# Patient Record
Sex: FEMALE | Race: WHITE | NOT HISPANIC OR LATINO | Employment: OTHER | URBAN - METROPOLITAN AREA
[De-identification: names, ages, dates, MRNs, and addresses within clinical notes are randomized per-mention and may not be internally consistent; named-entity substitution may affect disease eponyms.]

---

## 2017-01-03 ENCOUNTER — PATIENT OUTREACH (OUTPATIENT)
Dept: ADMINISTRATIVE | Facility: HOSPITAL | Age: 72
End: 2017-01-03

## 2017-01-03 NOTE — LETTER
January 3, 2017    Luis Miguel Murcia  [address] [facility] [address]  [facility] [address]             [facility]  [address]  [facility] [address]  Phone: [phone] Dear Mrs. Murcia:    [facility] is committed to your overall health.  To help you get the most out of each of your visits, we will review your information to make sure you are up to date on all of your recommended tests and/or procedures.      Dr. Jeong       has found that you may be due for:    One-time Hepatitis C Screening lab test(a viral condition that can harm the liver)  Tetanus immunization  Pneumonia immunization    If you have had any of the above done at another facility, please bring the records or information with you so that your record at [facility] will be complete.     If you are currently taking medication , please bring it with you to your appointment for review.    If you have any questions or concerns, please don't hesitate to call.    Sincerely,    Debbie Carter  Clinical Care Coordinator  [facility]  [address] [place] [address]  Phone: [phone]   Fax: [phone]

## 2017-01-06 NOTE — TELEPHONE ENCOUNTER
----- Message from Bianca Barrera sent at 1/6/2017  1:12 PM CST -----  Contact: Patient  Patient needs refills on Hydrocodone, and Xanax sent [facility] [address]. If any questions call patient at [phone].

## 2017-01-09 RX ORDER — HYDROCODONE BITARTRATE AND ACETAMINOPHEN 5; 325 MG/1; MG/1
1 TABLET ORAL 2 TIMES DAILY PRN
Qty: 60 TABLET | Refills: 0 | Status: SHIPPED | OUTPATIENT
Start: 2017-01-09 | End: 2017-01-17 | Stop reason: SDUPTHER

## 2017-01-09 RX ORDER — ALPRAZOLAM 0.25 MG/1
0.25 TABLET ORAL 2 TIMES DAILY
Qty: 60 TABLET | Refills: 5 | Status: SHIPPED | OUTPATIENT
Start: 2017-01-09 | End: 2017-06-12 | Stop reason: SDUPTHER

## 2017-01-17 ENCOUNTER — TELEPHONE (OUTPATIENT)
Dept: FAMILY MEDICINE | Facility: CLINIC | Age: 72
End: 2017-01-17

## 2017-01-17 ENCOUNTER — OFFICE VISIT (OUTPATIENT)
Dept: FAMILY MEDICINE | Facility: CLINIC | Age: 72
End: 2017-01-17
Payer: MEDICARE

## 2017-01-17 ENCOUNTER — HOSPITAL ENCOUNTER (OUTPATIENT)
Dept: RADIOLOGY | Facility: HOSPITAL | Age: 72
Discharge: HOME | End: 2017-01-17
Attending: FAMILY MEDICINE
Payer: MEDICARE

## 2017-01-17 VITALS
SYSTOLIC BLOOD PRESSURE: 122 MMHG | HEIGHT: 62 IN | TEMPERATURE: 97 F | OXYGEN SATURATION: 98 % | HEART RATE: 77 BPM | DIASTOLIC BLOOD PRESSURE: 64 MMHG | WEIGHT: 125.25 LBS | BODY MASS INDEX: 23.05 KG/M2

## 2017-01-17 DIAGNOSIS — Z79.891 CHRONIC USE OF OPIATE DRUGS THERAPEUTIC PURPOSES: ICD-10-CM

## 2017-01-17 DIAGNOSIS — J44.1 COPD EXACERBATION: Primary | ICD-10-CM

## 2017-01-17 DIAGNOSIS — I10 ESSENTIAL HYPERTENSION: ICD-10-CM

## 2017-01-17 DIAGNOSIS — G47.00 INSOMNIA, UNSPECIFIED TYPE: ICD-10-CM

## 2017-01-17 DIAGNOSIS — M47.817 LUMBOSACRAL SPONDYLOSIS WITHOUT MYELOPATHY: ICD-10-CM

## 2017-01-17 DIAGNOSIS — J44.1 COPD EXACERBATION: ICD-10-CM

## 2017-01-17 DIAGNOSIS — F41.9 ANXIETY: ICD-10-CM

## 2017-01-17 DIAGNOSIS — M50.30 DEGENERATION OF CERVICAL INTERVERTEBRAL DISC: ICD-10-CM

## 2017-01-17 PROCEDURE — 99214 OFFICE O/P EST MOD 30 MIN: CPT | Mod: S$PBB,,, | Performed by: FAMILY MEDICINE

## 2017-01-17 PROCEDURE — 99999 PR PBB SHADOW E&M-EST. PATIENT-LVL III: CPT | Mod: PBBFAC,,, | Performed by: FAMILY MEDICINE

## 2017-01-17 PROCEDURE — 94640 AIRWAY INHALATION TREATMENT: CPT | Mod: PBBFAC,PO

## 2017-01-17 PROCEDURE — 71020 XR CHEST PA AND LATERAL: CPT | Mod: TC,PO

## 2017-01-17 PROCEDURE — 99213 OFFICE O/P EST LOW 20 MIN: CPT | Mod: PBBFAC,PO | Performed by: FAMILY MEDICINE

## 2017-01-17 PROCEDURE — 71020 XR CHEST PA AND LATERAL: CPT | Mod: 26,,, | Performed by: RADIOLOGY

## 2017-01-17 RX ORDER — PREDNISONE 10 MG/1
TABLET ORAL
Qty: 20 TABLET | Refills: 0 | Status: SHIPPED | OUTPATIENT
Start: 2017-01-17 | End: 2017-07-18

## 2017-01-17 RX ORDER — ALBUTEROL SULFATE 2.5 MG/.5ML
2.5 SOLUTION RESPIRATORY (INHALATION)
Status: DISCONTINUED | OUTPATIENT
Start: 2017-01-17 | End: 2017-01-17

## 2017-01-17 RX ORDER — ALBUTEROL SULFATE 2.5 MG/.5ML
2.5 SOLUTION RESPIRATORY (INHALATION)
Status: COMPLETED | OUTPATIENT
Start: 2017-01-17 | End: 2017-01-17

## 2017-01-17 RX ORDER — DOXYCYCLINE 100 MG/1
100 CAPSULE ORAL 2 TIMES DAILY
Qty: 20 CAPSULE | Refills: 0 | Status: SHIPPED | OUTPATIENT
Start: 2017-01-17 | End: 2017-07-18

## 2017-01-17 RX ORDER — HYDROCODONE BITARTRATE AND ACETAMINOPHEN 5; 325 MG/1; MG/1
1 TABLET ORAL 2 TIMES DAILY PRN
Qty: 60 TABLET | Refills: 0 | Status: SHIPPED | OUTPATIENT
Start: 2017-01-17 | End: 2017-03-13 | Stop reason: SDUPTHER

## 2017-01-17 RX ORDER — LIDOCAINE AND PRILOCAINE 25; 25 MG/G; MG/G
CREAM TOPICAL
COMMUNITY
Start: 2016-11-07 | End: 2018-03-26

## 2017-01-17 RX ORDER — AZELASTINE 1 MG/ML
SPRAY, METERED NASAL
Refills: 12 | COMMUNITY
Start: 2016-12-05 | End: 2017-07-18 | Stop reason: SDUPTHER

## 2017-01-17 RX ORDER — ALBUTEROL SULFATE 90 UG/1
2 AEROSOL, METERED RESPIRATORY (INHALATION) EVERY 6 HOURS PRN
Qty: 1 EACH | Refills: 11 | Status: SHIPPED | OUTPATIENT
Start: 2017-01-17 | End: 2018-06-21 | Stop reason: SDUPTHER

## 2017-01-17 RX ADMIN — ALBUTEROL SULFATE 2.5 MG: 2.5 SOLUTION RESPIRATORY (INHALATION) at 12:01

## 2017-01-17 NOTE — MR AVS SNAPSHOT
[facility]  [address] [facility]  [facility] [address]  Phone: [phone]  Fax: [phone]                  Luis Miguel Murcia   2017 10:40 AM   Office Visit    Description:  Female : 1945   Provider:  Juan Francisco Jeong MD   Department:  [facility]           Reason for Visit     Cough           Diagnoses this Visit        Comments    COPD exacerbation    -  Primary     Degeneration of cervical intervertebral disc         Lumbosacral spondylosis without myelopathy         Chronic use of opiate drugs therapeutic purposes         Essential hypertension         Insomnia, unspecified type         Anxiety                To Do List           Future Appointments        Provider Department Dept Phone    2017 2:30 PM NS XR3 [facility] [phone]    2017 4:00 PM Pierre Black MD [facility] [phone]    2017 11:40 AM Juan Francisco Jeong MD [facility] [phone]      Goals (5 Years of Data)     None      Follow-Up and Disposition     Return in about 6 months (around 2017).       These Medications        Disp Refills Start End    hydrocodone-acetaminophen 5-325mg (NORCO) 5-325 mg per tablet 60 tablet 0 2017     Take 1 tablet by mouth 2 (two) times daily as needed for Pain. - Oral    Pharmacy: [email or web address] [address] [place] [address] AT [facility] [employer] [facility] Ph #: [phone]       predniSONE (DELTASONE) 10 MG tablet 20 tablet 0 2017     Take 4 tabs daily for 2 days then Take 3 tabs daily for 2 days thenTake 2 tabs daily for 2 days then Take 1 tab daily for 2 days then stop    Pharmacy: [facility] [address] [place] [address] [facility] AT [facility] [employer] [facility] [employer] [address] Ph #: [phone]       doxycycline (MONODOX) 100 MG capsule 20 capsule 0 2017     Take 1 capsule (100 mg total) by mouth 2 (two) times daily. - Oral    Pharmacy:  [facility] [address] [place] [address] [address] [facility] [address] Ph #: [phone]       albuterol 90 mcg/actuation inhaler 1 each 11 1/17/2017     Inhale 2 puffs into the lungs every 6 (six) hours as needed for Wheezing. - Inhalation    Pharmacy: [facility] [address] [place] [address] [address] AT [facility] [address] Ph #: [phone]         [facility]     [facility] On Call Nurse Care Line - 24/7 Assistance  Registered nurses in the [facility] Center provide clinical advisement, health education, appointment booking, and other advisory services.  Call for this free service at [phone].             Medications           Message regarding Medications     Verify the changes and/or additions to your medication regime listed below are the same as discussed with your clinician today.  If any of these changes or additions are incorrect, please notify your healthcare provider.        START taking these NEW medications        Refills    predniSONE (DELTASONE) 10 MG tablet 0    Sig: Take 4 tabs daily for 2 days then Take 3 tabs daily for 2 days thenTake 2 tabs daily for 2 days then Take 1 tab daily for 2 days then stop    Class: Normal    doxycycline (MONODOX) 100 MG capsule 0    Sig: Take 1 capsule (100 mg total) by mouth 2 (two) times daily.    Class: Normal    Route: Oral    albuterol 90 mcg/actuation inhaler 11    Sig: Inhale 2 puffs into the lungs every 6 (six) hours as needed for Wheezing.    Class: Normal    Route: Inhalation      These medications were administered today        Dose Freq    albuterol sulfate nebulizer solution 2.5 mg 2.5 mg [facility] 1 time    Sig: Take 2.5 mg by nebulization one time.    Class: Normal    Route: Nebulization      STOP taking these medications     alendronate (FOSAMAX) 70 MG tablet TAKE 1 TABLET(70 MG) BY MOUTH EVERY 7 DAYS           Verify that the below list of medications is an accurate representation of the  medications you are currently taking.  If none reported, the list may be blank. If incorrect, please contact your healthcare provider. Carry this list with you in case of emergency.           Current Medications     alprazolam (XANAX) 0.25 MG tablet Take 1 tablet (0.25 mg total) by mouth 2 (two) times daily. as needed for anxiety.    amlodipine (NORVASC) 5 MG tablet Take 1 tablet (5 mg total) by mouth once daily.    ASCORBATE CALCIUM (VITAMIN C ORAL) Take 1,000 mg by mouth once daily.     aspirin (ECOTRIN) 81 MG EC tablet Take 81 mg by mouth once daily.    azelastine (ASTELIN) 137 mcg (0.1 %) nasal spray     b complex vitamins tablet Take 1 tablet by mouth once daily.      biotin 1 mg tablet Take 1,000 mcg by mouth once daily. 5000 mg every day    calcium-vitamin D3 500 mg(1,250mg) -200 unit per tablet Take 1 tablet by mouth 2 (two) times daily with meals.    carvedilol (COREG) 6.25 MG tablet Take 1 tablet (6.25 mg total) by mouth 2 (two) times daily with meals.    dexlansoprazole (DEXILANT) 60 mg capsule Take 1 capsule (60 mg total) by mouth before breakfast.    DOCOSAHEXANOIC ACID/EPA (FISH OIL ORAL) Take 1 capsule by mouth once daily.      hydrocodone-acetaminophen 5-325mg (NORCO) 5-325 mg per tablet Take 1 tablet by mouth 2 (two) times daily as needed for Pain.    lidocaine-prilocaine (EMLA) cream     loratadine (CLARITIN) 10 mg tablet Take 1 tablet (10 mg total) by mouth once daily.    mometasone (NASONEX) 50 mcg/actuation nasal spray INSTILL 2 SPRAY IN EACH NOSTRIL EVERY DAY    pravastatin (PRAVACHOL) 20 MG tablet Take 1 tablet (20 mg total) by mouth nightly.    ranitidine (ZANTAC) 150 MG tablet Take 1 tablet (150 mg total) by mouth nightly.    zolpidem (AMBIEN) 10 mg Tab TAKE 1 TABLET BY MOUTH EVERY NIGHT AT BEDTIME    albuterol 90 mcg/actuation inhaler Inhale 2 puffs into the lungs every 6 (six) hours as needed for Wheezing.    doxycycline (MONODOX) 100 MG capsule Take 1 capsule (100 mg total) by mouth 2  "(two) times daily.    predniSONE (DELTASONE) 10 MG tablet Take 4 tabs daily for 2 days then Take 3 tabs daily for 2 days thenTake 2 tabs daily for 2 days then Take 1 tab daily for 2 days then stop           Clinical Reference Information           Vital Signs - Last Recorded  Most recent update: 1/17/2017 10:58 AM by Emili Morillo LPN    BP Pulse Temp Ht Wt SpO2    122/64 77 97 °F (36.1 °C) (Oral) 5' 2" (1.575 m) 56.8 kg (125 lb 3.5 oz) 98%    BMI                22.9 kg/m2          Blood Pressure          Most Recent Value    BP  122/64      Allergies as of 1/17/2017     Penicillins    Chlorhexidine    Hydrochlorothiazide      Immunizations Administered on Date of Encounter - 1/17/2017     None      Orders Placed During Today's Visit     Future Labs/Procedures Expected by Expires    X-Ray Chest PA And Lateral  1/17/2017 4/17/2017      "

## 2017-01-17 NOTE — PROGRESS NOTES
Subjective:       Patient ID: Luis Miguel Murcia is a 71 y.o. female.    Chief Complaint: Cough    HPI     htn is stable.     C/o chronic right lumbar radicular pain > 1 year. Sees pain management. Reports worsening low back pain x 3 weeks. Currently taking norco bid instead of daily. Had poppy on 5/2/16.      Also, has chronic neck pain > 1 year. Sees pain management. Ps: 2-3/10 while on norco     Imaging:  C-spine MRI 9/30/10: There is anterolisthesis of C3 on 4 and 4 on 5 mile, retrolisthesis of C5 and 6. At C3/4 there is a mild bulge with foraminal narrowing bilaterally mild. At C5-C6 there is a disc bulge with right upper protrusion of the disc but severe foraminal narrowing on the right and moderate to severe on the left. There is disc bulging at C6/7 with a bulge more to the right.     MRI L-spine 5/2011: At L3/4 there is mild anterolisthesis, Modic endplate changes. There is bilateral severe facet arthropathy and a broad-based disc bulge more to the left with compression of the descending L4 nerve root and slight effacement of the L3 nerve root at that level. At L4-5 there is bilateral facet arthropathy. At L5/S1 there is mild disc bulge and arthropathy. There is also Ligamentous hypertrophy at L4/5 posteriorly causing some narrowing of the central canal.     Anxiety stable while taking xanax.     C/o  cough, wheeze, subjective fever and malaise that started 1 week ago.  Reports intermittently coughing up grey phlegm.  Last had subjective fever approx 2-3 days ago.           Review of Systems      Review of Systems   Constitutional: Negative for fever and chills.   HENT: Negative for hearing loss and neck stiffness.    Eyes: Negative for redness and itching.   Respiratory: Negative for choking.    Cardiovascular: Negative for chest pain and leg swelling.  Abdomen: Negative for abdominal pain and blood in stool.   Genitourinary: Negative for dysuria and flank pain.   Musculoskeletal: Negative for gait  problem.   Neurological: Negative for light-headedness and headaches.   Hematological: Negative for adenopathy.   Psychiatric/Behavioral: Negative for behavioral problems.     Objective:      Physical Exam   Constitutional: She appears well-developed.   HENT:   Head: Normocephalic and atraumatic.   Eyes: Conjunctivae are normal. Pupils are equal, round, and reactive to light.   Neck: Normal range of motion.   Cardiovascular: Normal rate and regular rhythm.    No murmur heard.  Pulmonary/Chest: Effort normal.   Dec bs thruout. Mild exp wheeze.    Lymphadenopathy:     She has no cervical adenopathy.       Assessment:       1. COPD exacerbation    2. Degeneration of cervical intervertebral disc    3. Lumbosacral spondylosis without myelopathy    4. Chronic use of opiate drugs therapeutic purposes    5. Essential hypertension    6. Insomnia, unspecified type    7. Anxiety        Plan:       COPD exacerbation  -     X-Ray Chest PA And Lateral; Future; Expected date: 1/17/17    Degeneration of cervical intervertebral disc    Lumbosacral spondylosis without myelopathy    Chronic use of opiate drugs therapeutic purposes    Essential hypertension    Insomnia, unspecified type    Anxiety    Other orders  -     hydrocodone-acetaminophen 5-325mg (NORCO) 5-325 mg per tablet; Take 1 tablet by mouth 2 (two) times daily as needed for Pain.  Dispense: 60 tablet; Refill: 0  -     Discontinue: albuterol sulfate nebulizer solution 2.5 mg; Take 2.5 mg by nebulization one time.  -     predniSONE (DELTASONE) 10 MG tablet; Take 4 tabs daily for 2 days then Take 3 tabs daily for 2 days thenTake 2 tabs daily for 2 days then Take 1 tab daily for 2 days then stop  Dispense: 20 tablet; Refill: 0  -     doxycycline (MONODOX) 100 MG capsule; Take 1 capsule (100 mg total) by mouth 2 (two) times daily.  Dispense: 20 capsule; Refill: 0  -     albuterol 90 mcg/actuation inhaler; Inhale 2 puffs into the lungs every 6 (six) hours as needed for  Wheezing.  Dispense: 1 each; Refill: 11  -     albuterol sulfate nebulizer solution 2.5 mg; Take 2.5 mg by nebulization one time.          Plan:  rf norco  Better a/e on auscultation of lungs after albuterol neb  Order cxray to r/o pneumonia. Start doxy, prednisone taper and proair  Cont all other meds    Medication List with Changes/Refills   New Medications    ALBUTEROL 90 MCG/ACTUATION INHALER    Inhale 2 puffs into the lungs every 6 (six) hours as needed for Wheezing.    DOXYCYCLINE (MONODOX) 100 MG CAPSULE    Take 1 capsule (100 mg total) by mouth 2 (two) times daily.    PREDNISONE (DELTASONE) 10 MG TABLET    Take 4 tabs daily for 2 days then Take 3 tabs daily for 2 days thenTake 2 tabs daily for 2 days then Take 1 tab daily for 2 days then stop   Current Medications    ALPRAZOLAM (XANAX) 0.25 MG TABLET    Take 1 tablet (0.25 mg total) by mouth 2 (two) times daily. as needed for anxiety.    AMLODIPINE (NORVASC) 5 MG TABLET    Take 1 tablet (5 mg total) by mouth once daily.    ASCORBATE CALCIUM (VITAMIN C ORAL)    Take 1,000 mg by mouth once daily.     ASPIRIN (ECOTRIN) 81 MG EC TABLET    Take 81 mg by mouth once daily.    AZELASTINE (ASTELIN) 137 MCG (0.1 %) NASAL SPRAY        B COMPLEX VITAMINS TABLET    Take 1 tablet by mouth once daily.      BIOTIN 1 MG TABLET    Take 1,000 mcg by mouth once daily. 5000 mg every day    CALCIUM-VITAMIN D3 500 MG(1,250MG) -200 UNIT PER TABLET    Take 1 tablet by mouth 2 (two) times daily with meals.    CARVEDILOL (COREG) 6.25 MG TABLET    Take 1 tablet (6.25 mg total) by mouth 2 (two) times daily with meals.    DEXLANSOPRAZOLE (DEXILANT) 60 MG CAPSULE    Take 1 capsule (60 mg total) by mouth before breakfast.    DOCOSAHEXANOIC ACID/EPA (FISH OIL ORAL)    Take 1 capsule by mouth once daily.      LIDOCAINE-PRILOCAINE (EMLA) CREAM        LORATADINE (CLARITIN) 10 MG TABLET    Take 1 tablet (10 mg total) by mouth once daily.    MOMETASONE (NASONEX) 50 MCG/ACTUATION NASAL SPRAY     INSTILL 2 SPRAY IN EACH NOSTRIL EVERY DAY    PRAVASTATIN (PRAVACHOL) 20 MG TABLET    Take 1 tablet (20 mg total) by mouth nightly.    RANITIDINE (ZANTAC) 150 MG TABLET    Take 1 tablet (150 mg total) by mouth nightly.    ZOLPIDEM (AMBIEN) 10 MG TAB    TAKE 1 TABLET BY MOUTH EVERY NIGHT AT BEDTIME   Changed and/or Refilled Medications    Modified Medication Previous Medication    HYDROCODONE-ACETAMINOPHEN 5-325MG (NORCO) 5-325 MG PER TABLET hydrocodone-acetaminophen 5-325mg (NORCO) 5-325 mg per tablet       Take 1 tablet by mouth 2 (two) times daily as needed for Pain.    Take 1 tablet by mouth 2 (two) times daily as needed for Pain.   Discontinued Medications    ALENDRONATE (FOSAMAX) 70 MG TABLET    TAKE 1 TABLET(70 MG) BY MOUTH EVERY 7 DAYS    NASONEX 50 MCG/ACTUATION NASAL SPRAY    INSTILL 2 SPRAYS INTO EACH NOSTRIL EVERY DAY

## 2017-01-17 NOTE — TELEPHONE ENCOUNTER
inform pt via phone that I reviewed the test results and note the following:    Your chest xray was negative for pneumonia

## 2017-02-16 ENCOUNTER — TELEPHONE (OUTPATIENT)
Dept: SURGERY | Facility: CLINIC | Age: 72
End: 2017-02-16

## 2017-02-16 NOTE — TELEPHONE ENCOUNTER
----- Message from Joslyn Rocha sent at 2/16/2017 11:41 AM CST -----  Contact: pt  Pt has questions about appt has on 2-23-17  Call back on # [phone]   thanks  Patient had some questions about the banding procedure on 2/23/17.  Patient is concerned about taking a sits bath because of her age.  She lives alone and is concerned that she may not be able to get up out of the tub.  Also, she wanted to know the recovery time from banding, as I explained to her that every patient heals differently and she should be okay after 2-3 days.  I told patient to call back if she has anymore concerns.  Renato

## 2017-02-23 ENCOUNTER — OFFICE VISIT (OUTPATIENT)
Dept: SURGERY | Facility: CLINIC | Age: 72
End: 2017-02-23
Payer: MEDICARE

## 2017-02-23 VITALS
HEIGHT: 62 IN | SYSTOLIC BLOOD PRESSURE: 147 MMHG | BODY MASS INDEX: 23.08 KG/M2 | HEART RATE: 70 BPM | WEIGHT: 125.44 LBS | RESPIRATION RATE: 16 BRPM | DIASTOLIC BLOOD PRESSURE: 72 MMHG

## 2017-02-23 DIAGNOSIS — K64.2 THIRD DEGREE HEMORRHOIDS: Primary | ICD-10-CM

## 2017-02-23 PROCEDURE — 46221 LIGATION OF HEMORRHOID(S): CPT | Mod: S$PBB,,, | Performed by: SURGERY

## 2017-02-23 PROCEDURE — 99213 OFFICE O/P EST LOW 20 MIN: CPT | Mod: PBBFAC,PO | Performed by: SURGERY

## 2017-02-23 PROCEDURE — 99499 UNLISTED E&M SERVICE: CPT | Mod: S$PBB,,, | Performed by: SURGERY

## 2017-02-23 PROCEDURE — 46221 LIGATION OF HEMORRHOID(S): CPT | Mod: PBBFAC,PO | Performed by: SURGERY

## 2017-02-23 PROCEDURE — 99999 PR PBB SHADOW E&M-EST. PATIENT-LVL III: CPT | Mod: PBBFAC,,, | Performed by: SURGERY

## 2017-02-23 NOTE — PROGRESS NOTES
Jovita 72 yo F whom I am familiar with.  She has known hemorrhoids and was recommended to have banding in the past.  She returns today for banding.  Notes bleeding and increased prolapse tissue.       AFVSS  AAOx3  CTA B  Sinus  Soft/nd/nt  Rectal: ext exam demonstrates nonthrombosed ext hemorrhoids. D RE with normal sphincter tone.  Anoscopy performed demonstrating enlarged int hemorrhoids in the R ant, R post and L lateral position    A/P: Prolapsing hemorrhoids    D/w pt recommended banding which she desired to proceed with    Having received informed consent pt was placed in prone jackknife position. I then placed rubber bands on the L lateral column, R Ant and R post columns without event. Bleeding was minimal and pt tolerated the procedure well.      Pt will RTC in 6 weeks

## 2017-02-23 NOTE — MR AVS SNAPSHOT
[facility]  [address]  [facility] [address]  Phone: [phone]                  Luis Miguel Murcia   2017 3:45 PM   Office Visit    Description:  Female : 1945   Provider:  Pierre Black MD   Department:  [facility]           Reason for Visit     Hemorrhoids           Diagnoses this Visit        Comments    Third degree hemorrhoids    -  Primary            To Do List           Future Appointments        Provider Department Dept Phone    2017 11:40 AM Juan Francisco Jeong MD [facility] [phone]      Goals (5 Years of Data)     None      [facility]     [facility] On Call Nurse Care Line -  Assistance  Registered nurses in the [facility] On Call Center provide clinical advisement, health education, appointment booking, and other advisory services.  Call for this free service at [phone].             Medications           Message regarding Medications     Verify the changes and/or additions to your medication regime listed below are the same as discussed with your clinician today.  If any of these changes or additions are incorrect, please notify your healthcare provider.             Verify that the below list of medications is an accurate representation of the medications you are currently taking.  If none reported, the list may be blank. If incorrect, please contact your healthcare provider. Carry this list with you in case of emergency.           Current Medications     albuterol 90 mcg/actuation inhaler Inhale 2 puffs into the lungs every 6 (six) hours as needed for Wheezing.    alprazolam (XANAX) 0.25 MG tablet Take 1 tablet (0.25 mg total) by mouth 2 (two) times daily. as needed for anxiety.    amlodipine (NORVASC) 5 MG tablet Take 1 tablet (5 mg total) by mouth once daily.    ASCORBATE CALCIUM (VITAMIN C ORAL) Take 1,000 mg by mouth once daily.     aspirin (ECOTRIN) 81 MG EC tablet Take 81 mg by mouth once  "daily.    azelastine (ASTELIN) 137 mcg (0.1 %) nasal spray     b complex vitamins tablet Take 1 tablet by mouth once daily.      biotin 1 mg tablet Take 1,000 mcg by mouth once daily. 5000 mg every day    calcium-vitamin D3 500 mg(1,250mg) -200 unit per tablet Take 1 tablet by mouth 2 (two) times daily with meals.    carvedilol (COREG) 6.25 MG tablet Take 1 tablet (6.25 mg total) by mouth 2 (two) times daily with meals.    dexlansoprazole (DEXILANT) 60 mg capsule Take 1 capsule (60 mg total) by mouth before breakfast.    DOCOSAHEXANOIC ACID/EPA (FISH OIL ORAL) Take 1 capsule by mouth once daily.      doxycycline (MONODOX) 100 MG capsule Take 1 capsule (100 mg total) by mouth 2 (two) times daily.    hydrocodone-acetaminophen 5-325mg (NORCO) 5-325 mg per tablet Take 1 tablet by mouth 2 (two) times daily as needed for Pain.    lidocaine-prilocaine (EMLA) cream     loratadine (CLARITIN) 10 mg tablet Take 1 tablet (10 mg total) by mouth once daily.    mometasone (NASONEX) 50 mcg/actuation nasal spray INSTILL 2 SPRAY IN EACH NOSTRIL EVERY DAY    pravastatin (PRAVACHOL) 20 MG tablet Take 1 tablet (20 mg total) by mouth nightly.    predniSONE (DELTASONE) 10 MG tablet Take 4 tabs daily for 2 days then Take 3 tabs daily for 2 days thenTake 2 tabs daily for 2 days then Take 1 tab daily for 2 days then stop    ranitidine (ZANTAC) 150 MG tablet Take 1 tablet (150 mg total) by mouth nightly.    zolpidem (AMBIEN) 10 mg Tab TAKE 1 TABLET BY MOUTH EVERY NIGHT AT BEDTIME           Clinical Reference Information           Your Vitals Were     BP Pulse Resp Height Weight BMI    147/72 70 16 5' 2" (1.575 m) 56.9 kg (125 lb 7.1 oz) 22.94 kg/m2      Blood Pressure          Most Recent Value    BP  (!)  147/72      Allergies as of 2/23/2017     Penicillins    Chlorhexidine    Hydrochlorothiazide      Immunizations Administered on Date of Encounter - 2/23/2017     None      Language Assistance Services     ATTENTION: Language assistance " services are available, free of charge. Please call [phone].      ATENCIÓN: Si habla arron, tiene a durand disposición servicios gratuitos de asistencia lingüística. Llame al [phone].     CHÚ Ý: N?u b?n nói Ti?ng Vi?t, có các d?ch v? h? tr? ngôn ng? mi?n phí dành cho b?n. G?i s? [phone].         [facility] complies with applicable Federal civil rights laws and does not discriminate on the basis of race, color, national origin, age, disability, or sex.

## 2017-03-13 NOTE — TELEPHONE ENCOUNTER
----- Message from Brianna Mac sent at 3/13/2017 12:56 PM CDT -----  Contact: pt  hydrocodone-acetaminophen 5-325mg (NORCO) 5-325 mg per  .  [facility] [place] [address] - [address] [facility] [address] & [address] [address]  Phone: [phone] Fax: [phone]    Call back

## 2017-03-15 RX ORDER — HYDROCODONE BITARTRATE AND ACETAMINOPHEN 5; 325 MG/1; MG/1
1 TABLET ORAL 2 TIMES DAILY PRN
Qty: 60 TABLET | Refills: 0 | Status: SHIPPED | OUTPATIENT
Start: 2017-03-15 | End: 2017-03-16 | Stop reason: SDUPTHER

## 2017-03-16 NOTE — TELEPHONE ENCOUNTER
----- Message from Avelina Brambila sent at 3/16/2017 10:56 AM CDT -----  hydrocodone-acetaminophen 5-325mg (NORCO) 5-325 mg per tablet refill    [facility] [address] - [address] [place] [address] & [address] [address]  Phone: [phone] Fax: [phone]

## 2017-03-19 RX ORDER — HYDROCODONE BITARTRATE AND ACETAMINOPHEN 5; 325 MG/1; MG/1
1 TABLET ORAL 2 TIMES DAILY PRN
Qty: 60 TABLET | Refills: 0 | Status: SHIPPED | OUTPATIENT
Start: 2017-03-19 | End: 2017-04-18 | Stop reason: SDUPTHER

## 2017-03-20 DIAGNOSIS — J31.0 CHRONIC RHINITIS: ICD-10-CM

## 2017-03-20 DIAGNOSIS — K21.9 LPRD (LARYNGOPHARYNGEAL REFLUX DISEASE): ICD-10-CM

## 2017-03-21 RX ORDER — AZELASTINE 1 MG/ML
SPRAY, METERED NASAL
Qty: 30 ML | Refills: 0 | Status: SHIPPED | OUTPATIENT
Start: 2017-03-21 | End: 2017-06-12 | Stop reason: SDUPTHER

## 2017-03-21 RX ORDER — DEXLANSOPRAZOLE 60 MG/1
CAPSULE, DELAYED RELEASE ORAL
Qty: 90 CAPSULE | Refills: 0 | Status: SHIPPED | OUTPATIENT
Start: 2017-03-21 | End: 2017-06-12 | Stop reason: SDUPTHER

## 2017-04-18 NOTE — TELEPHONE ENCOUNTER
----- Message from Kasei Dilma sent at 4/18/2017 11:30 AM CDT -----  Contact: Patient  Patient states that she needs her refill for the hydrocodone-acetaminophen 5-325mg (NORCO) 5-325 mg per tablets.  Any questions, please call [phone].  Thank you      [facility] [address] [place] [address] & [address] [address]  Phone: [phone] Fax: [phone]

## 2017-04-20 RX ORDER — HYDROCODONE BITARTRATE AND ACETAMINOPHEN 5; 325 MG/1; MG/1
1 TABLET ORAL 2 TIMES DAILY PRN
Qty: 60 TABLET | Refills: 0 | Status: SHIPPED | OUTPATIENT
Start: 2017-04-20 | End: 2017-04-20 | Stop reason: SDUPTHER

## 2017-04-20 NOTE — TELEPHONE ENCOUNTER
----- Message from Joyce Downs sent at 4/20/2017 12:25 PM CDT -----  Refill on Rx Hydrocodone.  Please send into [facility].  Any questions call [phone].

## 2017-04-23 RX ORDER — HYDROCODONE BITARTRATE AND ACETAMINOPHEN 5; 325 MG/1; MG/1
1 TABLET ORAL 2 TIMES DAILY PRN
Qty: 60 TABLET | Refills: 0 | Status: SHIPPED | OUTPATIENT
Start: 2017-04-23 | End: 2017-05-18 | Stop reason: SDUPTHER

## 2017-05-18 NOTE — TELEPHONE ENCOUNTER
----- Message from Cindy Marie sent at 5/18/2017  1:29 PM CDT -----  Patient needs a refill of medication: Hydrocodone called into [facility]'s pharmacy on [id number]. Please order or call patient back at [phone] if you have any questions. Thanks!

## 2017-05-19 RX ORDER — HYDROCODONE BITARTRATE AND ACETAMINOPHEN 5; 325 MG/1; MG/1
1 TABLET ORAL 2 TIMES DAILY PRN
Qty: 60 TABLET | Refills: 0 | Status: SHIPPED | OUTPATIENT
Start: 2017-05-19 | End: 2017-06-12 | Stop reason: SDUPTHER

## 2017-06-02 RX ORDER — ZOLPIDEM TARTRATE 10 MG/1
TABLET ORAL
Qty: 30 TABLET | Refills: 2 | Status: SHIPPED | OUTPATIENT
Start: 2017-06-02 | End: 2017-08-29 | Stop reason: SDUPTHER

## 2017-06-12 DIAGNOSIS — K21.9 LPRD (LARYNGOPHARYNGEAL REFLUX DISEASE): ICD-10-CM

## 2017-06-12 DIAGNOSIS — J31.0 CHRONIC RHINITIS: ICD-10-CM

## 2017-06-12 NOTE — TELEPHONE ENCOUNTER
----- Message from María Beverly sent at 6/12/2017 11:41 AM CDT -----  Contact: patient  Patient calling in regards to requesting refills for Dexilant, Amlodipine, Carvedilol, Pravastatin, Alprazolam, Ranitidine, Hydrocodone and Azelastine.  Please advise.  Call back   Thanks!  [facility] [place] [address] AT [facility] [address] & [address] [address]  Phone: [phone] Fax: [phone]

## 2017-06-13 RX ORDER — CARVEDILOL 6.25 MG/1
TABLET ORAL
Qty: 180 TABLET | Refills: 0 | Status: SHIPPED | OUTPATIENT
Start: 2017-06-13 | End: 2017-07-18

## 2017-06-13 RX ORDER — AZELASTINE 1 MG/ML
SPRAY, METERED NASAL
Qty: 30 ML | Refills: 3 | Status: SHIPPED | OUTPATIENT
Start: 2017-06-13 | End: 2018-01-29 | Stop reason: SDUPTHER

## 2017-06-13 RX ORDER — AMLODIPINE BESYLATE 5 MG/1
5 TABLET ORAL DAILY
Qty: 90 TABLET | Refills: 3 | Status: SHIPPED | OUTPATIENT
Start: 2017-06-13 | End: 2018-07-31 | Stop reason: SDUPTHER

## 2017-06-13 RX ORDER — HYDROCODONE BITARTRATE AND ACETAMINOPHEN 5; 325 MG/1; MG/1
1 TABLET ORAL 2 TIMES DAILY PRN
Qty: 60 TABLET | Refills: 0 | Status: SHIPPED | OUTPATIENT
Start: 2017-06-13 | End: 2017-07-18 | Stop reason: SDUPTHER

## 2017-06-13 RX ORDER — PRAVASTATIN SODIUM 20 MG/1
20 TABLET ORAL NIGHTLY
Qty: 90 TABLET | Refills: 3 | Status: SHIPPED | OUTPATIENT
Start: 2017-06-13 | End: 2017-08-17

## 2017-06-13 RX ORDER — DEXLANSOPRAZOLE 60 MG/1
1 CAPSULE, DELAYED RELEASE ORAL EVERY MORNING
Qty: 90 CAPSULE | Refills: 3 | Status: SHIPPED | OUTPATIENT
Start: 2017-06-13 | End: 2018-06-12 | Stop reason: SDUPTHER

## 2017-06-13 RX ORDER — ALPRAZOLAM 0.25 MG/1
0.25 TABLET ORAL 2 TIMES DAILY
Qty: 60 TABLET | Refills: 5 | Status: SHIPPED | OUTPATIENT
Start: 2017-06-13 | End: 2017-12-05 | Stop reason: SDUPTHER

## 2017-07-11 ENCOUNTER — OFFICE VISIT (OUTPATIENT)
Dept: PODIATRY | Facility: CLINIC | Age: 72
End: 2017-07-11
Payer: MEDICARE

## 2017-07-11 VITALS — HEIGHT: 62 IN | BODY MASS INDEX: 23.08 KG/M2 | WEIGHT: 125.44 LBS

## 2017-07-11 DIAGNOSIS — M77.50 TENDINITIS OF ANKLE: ICD-10-CM

## 2017-07-11 DIAGNOSIS — M77.9 ENTHESITIS: Primary | ICD-10-CM

## 2017-07-11 PROCEDURE — 99213 OFFICE O/P EST LOW 20 MIN: CPT | Mod: PBBFAC,PO

## 2017-07-11 PROCEDURE — 1125F AMNT PAIN NOTED PAIN PRSNT: CPT | Mod: ,,,

## 2017-07-11 PROCEDURE — 99213 OFFICE O/P EST LOW 20 MIN: CPT | Mod: S$PBB,,,

## 2017-07-11 PROCEDURE — 1159F MED LIST DOCD IN RCRD: CPT | Mod: ,,,

## 2017-07-11 PROCEDURE — 99999 PR PBB SHADOW E&M-EST. PATIENT-LVL III: CPT | Mod: PBBFAC,,,

## 2017-07-11 RX ORDER — METHYLPREDNISOLONE 4 MG/1
TABLET ORAL
Qty: 1 PACKAGE | Refills: 0 | Status: SHIPPED | OUTPATIENT
Start: 2017-07-11 | End: 2017-07-18

## 2017-07-11 NOTE — PROGRESS NOTES
Chief Complaint   Patient presents with    Foot Pain     manasa foot pain        History of present illness: Patient returns to right PB tendinitis, enthesitis. She's doing worse, only improvement was with steroids, she has declined a boot.  Very active.    Review of Systems:  Vascular : negative for rest pain, claudication or bruising  Musculoskeletal: + for foot pain  Skin: negative for rashes, open wounds and lesions  Neurological: negative for burning, tingling and numbness  All other negative.    Past Medical, Family and Social History reviewed.    Constitutional:  Patient is oriented to person, place, and time. Vital signs are normal.  Appears well-developed and well-nourished.     Vascular:  Dorsalis pedis pulses are 2+ on the right side, and 2+ on the left side.   Posterior tibial pulses are 2+ on the right side, and 2+ on the left side.   + digital hair growth, capillary fill time to all toes <3 seconds, no swelling    Skin/Dermatological:  Skin is warm and intact.  No cyanosis or clubbing.  No rashes noted.  No open wounds.      Musculoskeletal:      Pes cavus with very mild tenderness to the base of the right fifth metatarsal and along the peroneal brevis tendon.        Neurological:  No deficits to sharp/dull, light touch or vibratory sensation.   Muscle strength to tibialis anterior, extensor hallucis longus, extensor digitorum longus, peroneal muscles, flexor hallucis/digotorum longus, posterior tibial and gastrosoleal complex is 5/5, normal tone without assymmetry   Patellar reflexes are 2+ on the right side and 2+ on the left side.  Achilles reflexes are 2+ on the right side and 2+ on the left side.      Assessment/Plan:  Enthesitis, peroneal brevis tendinitis right foot: Medrol Dosepack, short leg cast CHEKO camacho.  Referral to PT to start in 2 weeks if pain is better, rtc 1 month, consider MRI or surgery if no improvement.

## 2017-07-12 ENCOUNTER — TELEPHONE (OUTPATIENT)
Dept: PODIATRY | Facility: CLINIC | Age: 72
End: 2017-07-12

## 2017-07-12 NOTE — TELEPHONE ENCOUNTER
----- Message from Tash Greene sent at 7/12/2017  1:57 PM CDT -----  Contact: self  Patient calling back with the fax number to [facility] so orders can be sent to them for physical therapy.  The fax number is [phone]. please call patient at [phone]

## 2017-07-14 ENCOUNTER — TELEPHONE (OUTPATIENT)
Dept: PODIATRY | Facility: CLINIC | Age: 72
End: 2017-07-14

## 2017-07-14 NOTE — TELEPHONE ENCOUNTER
----- Message from Bianca Flores sent at 7/14/2017  9:39 AM CDT -----  Contact: Linda with [facility]  Linda needs the diagnosis codes for Physical Therapy. Please call Linda at [phone].

## 2017-07-14 NOTE — TELEPHONE ENCOUNTER
----- Message from Obinna Vickers sent at 7/14/2017  8:36 AM CDT -----  Contact: pt  Pt is requesting a callback, have some questions about the Physical Therapy  Call Back#[phone]  Thanks

## 2017-07-18 ENCOUNTER — OFFICE VISIT (OUTPATIENT)
Dept: FAMILY MEDICINE | Facility: CLINIC | Age: 72
End: 2017-07-18
Payer: MEDICARE

## 2017-07-18 VITALS
WEIGHT: 123.69 LBS | BODY MASS INDEX: 22.76 KG/M2 | SYSTOLIC BLOOD PRESSURE: 141 MMHG | OXYGEN SATURATION: 98 % | HEART RATE: 78 BPM | DIASTOLIC BLOOD PRESSURE: 90 MMHG | HEIGHT: 62 IN

## 2017-07-18 DIAGNOSIS — M51.37 DEGENERATION OF LUMBAR OR LUMBOSACRAL INTERVERTEBRAL DISC: ICD-10-CM

## 2017-07-18 DIAGNOSIS — J44.9 CHRONIC OBSTRUCTIVE PULMONARY DISEASE, UNSPECIFIED COPD TYPE: ICD-10-CM

## 2017-07-18 DIAGNOSIS — F41.9 ANXIETY: ICD-10-CM

## 2017-07-18 DIAGNOSIS — I10 ESSENTIAL HYPERTENSION: Primary | ICD-10-CM

## 2017-07-18 DIAGNOSIS — Z11.59 NEED FOR HEPATITIS C SCREENING TEST: ICD-10-CM

## 2017-07-18 DIAGNOSIS — Z12.11 COLON CANCER SCREENING: ICD-10-CM

## 2017-07-18 PROCEDURE — 99214 OFFICE O/P EST MOD 30 MIN: CPT | Mod: S$PBB,,, | Performed by: FAMILY MEDICINE

## 2017-07-18 PROCEDURE — 1126F AMNT PAIN NOTED NONE PRSNT: CPT | Mod: ,,, | Performed by: FAMILY MEDICINE

## 2017-07-18 PROCEDURE — 99999 PR PBB SHADOW E&M-EST. PATIENT-LVL III: CPT | Mod: PBBFAC,,, | Performed by: FAMILY MEDICINE

## 2017-07-18 PROCEDURE — 99213 OFFICE O/P EST LOW 20 MIN: CPT | Mod: PBBFAC,PO | Performed by: FAMILY MEDICINE

## 2017-07-18 PROCEDURE — 1159F MED LIST DOCD IN RCRD: CPT | Mod: ,,, | Performed by: FAMILY MEDICINE

## 2017-07-18 RX ORDER — HYDROCODONE BITARTRATE AND ACETAMINOPHEN 5; 325 MG/1; MG/1
1 TABLET ORAL 2 TIMES DAILY PRN
Qty: 60 TABLET | Refills: 0 | Status: ON HOLD | OUTPATIENT
Start: 2017-08-17 | End: 2017-08-18 | Stop reason: CLARIF

## 2017-07-18 RX ORDER — CARVEDILOL 12.5 MG/1
12.5 TABLET ORAL 2 TIMES DAILY WITH MEALS
Qty: 180 TABLET | Refills: 3 | Status: SHIPPED | OUTPATIENT
Start: 2017-07-18 | End: 2018-07-16 | Stop reason: SDUPTHER

## 2017-07-18 RX ORDER — HYDROCODONE BITARTRATE AND ACETAMINOPHEN 5; 325 MG/1; MG/1
1 TABLET ORAL 2 TIMES DAILY PRN
Qty: 60 TABLET | Refills: 0 | Status: ON HOLD | OUTPATIENT
Start: 2017-07-18 | End: 2017-08-18 | Stop reason: CLARIF

## 2017-07-18 RX ORDER — HYDROCODONE BITARTRATE AND ACETAMINOPHEN 5; 325 MG/1; MG/1
1 TABLET ORAL 2 TIMES DAILY PRN
Qty: 60 TABLET | Refills: 0 | Status: SHIPPED | OUTPATIENT
Start: 2017-09-16 | End: 2017-10-16

## 2017-07-18 NOTE — PROGRESS NOTES
Subjective:       Patient ID: Luis Miguel Murcia is a 72 y.o. female.    Chief Complaint: COPD and Hypertension    HPI     htn is stable.     C/o chronic right lumbar radicular pain > 1 year. Sees pain management. Ps: 2-3/10 while on norco.     Also, has chronic neck pain > 1 year. Sees pain management. Ps: 2-3/10 while on norco     Imaging:  C-spine MRI 9/30/10: There is anterolisthesis of C3 on 4 and 4 on 5 mile, retrolisthesis of C5 and 6. At C3/4 there is a mild bulge with foraminal narrowing bilaterally mild. At C5-C6 there is a disc bulge with right upper protrusion of the disc but severe foraminal narrowing on the right and moderate to severe on the left. There is disc bulging at C6/7 with a bulge more to the right.     MRI L-spine 5/2011: At L3/4 there is mild anterolisthesis, Modic endplate changes. There is bilateral severe facet arthropathy and a broad-based disc bulge more to the left with compression of the descending L4 nerve root and slight effacement of the L3 nerve root at that level. At L4-5 there is bilateral facet arthropathy. At L5/S1 there is mild disc bulge and arthropathy. There is also Ligamentous hypertrophy at L4/5 posteriorly causing some narrowing of the central canal.     Anxiety stable while taking xanax.     Copd stable. No worsening cough or wheeze.        Review of Systems      Review of Systems   Constitutional: Negative for fever and chills.   HENT: Negative for hearing loss and neck stiffness.    Eyes: Negative for redness and itching.   Respiratory: Negative for cough and choking.    Cardiovascular: Negative for chest pain and leg swelling.  Abdomen: Negative for abdominal pain and blood in stool.   Genitourinary: Negative for dysuria and flank pain.   Musculoskeletal: Negative for gait problem.   Neurological: Negative for light-headedness and headaches.   Hematological: Negative for adenopathy.   Psychiatric/Behavioral: Negative for behavioral problems.     Objective:       Physical Exam   Constitutional: She appears well-developed.   HENT:   Head: Normocephalic and atraumatic.   Eyes: Conjunctivae are normal. Pupils are equal, round, and reactive to light.   Neck: Normal range of motion.   Cardiovascular: Normal rate and regular rhythm.    No murmur heard.  Pulmonary/Chest: Effort normal and breath sounds normal. She has no wheezes.   Lymphadenopathy:     She has no cervical adenopathy.       Assessment:       1. Essential hypertension    2. Colon cancer screening    3. Need for hepatitis C screening test    4. Degeneration of lumbar or lumbosacral intervertebral disc    5. Anxiety    6. Chronic obstructive pulmonary disease, unspecified COPD type        Plan:       Essential hypertension-uncontrolled  -     Comprehensive metabolic panel; Future; Expected date: 07/18/2017  -     Lipid panel; Future; Expected date: 07/18/2017    Colon cancer screening  -     Case request GI: COLONOSCOPY    Need for hepatitis C screening test  -     Hepatitis C antibody; Future; Expected date: 01/17/2018    Degeneration of lumbar or lumbosacral intervertebral disc    Anxiety    Chronic obstructive pulmonary disease, unspecified COPD type    Other orders  -     carvedilol (COREG) 12.5 MG tablet; Take 1 tablet (12.5 mg total) by mouth 2 (two) times daily with meals.  Dispense: 180 tablet; Refill: 3  -     hydrocodone-acetaminophen 5-325mg (NORCO) 5-325 mg per tablet; Take 1 tablet by mouth 2 (two) times daily as needed for Pain.  Dispense: 60 tablet; Refill: 0  -     hydrocodone-acetaminophen 5-325mg (NORCO) 5-325 mg per tablet; Take 1 tablet by mouth 2 (two) times daily as needed for Pain.  Dispense: 60 tablet; Refill: 0  -     hydrocodone-acetaminophen 5-325mg (NORCO) 5-325 mg per tablet; Take 1 tablet by mouth 2 (two) times daily as needed for Pain.  Dispense: 60 tablet; Refill: 0      Plan:  Increase coreg for bp control  See orders  rf norco        Medication List with Changes/Refills   New  Medications    CARVEDILOL (COREG) 12.5 MG TABLET    Take 1 tablet (12.5 mg total) by mouth 2 (two) times daily with meals.    HYDROCODONE-ACETAMINOPHEN 5-325MG (NORCO) 5-325 MG PER TABLET    Take 1 tablet by mouth 2 (two) times daily as needed for Pain.    HYDROCODONE-ACETAMINOPHEN 5-325MG (NORCO) 5-325 MG PER TABLET    Take 1 tablet by mouth 2 (two) times daily as needed for Pain.   Current Medications    ALBUTEROL 90 MCG/ACTUATION INHALER    Inhale 2 puffs into the lungs every 6 (six) hours as needed for Wheezing.    ALPRAZOLAM (XANAX) 0.25 MG TABLET    Take 1 tablet (0.25 mg total) by mouth 2 (two) times daily. as needed for anxiety.    AMLODIPINE (NORVASC) 5 MG TABLET    Take 1 tablet (5 mg total) by mouth once daily.    ASCORBATE CALCIUM (VITAMIN C ORAL)    Take 1,000 mg by mouth once daily.     ASPIRIN (ECOTRIN) 81 MG EC TABLET    Take 81 mg by mouth once daily.    AZELASTINE (ASTELIN) 137 MCG (0.1 %) NASAL SPRAY    PLACE 2 SPRAYS IN EACH NOSTRIL EVERY DAY; MAY USE UP TO TWICE DAILY DEPENDING ON SYMPTOMS    B COMPLEX VITAMINS TABLET    Take 1 tablet by mouth once daily.      DEXLANSOPRAZOLE (DEXILANT) 60 MG CAPSULE    Take 1 capsule (60 mg total) by mouth every morning. before breakfast.    DOCOSAHEXANOIC ACID/EPA (FISH OIL ORAL)    Take 1 capsule by mouth once daily.      LIDOCAINE-PRILOCAINE (EMLA) CREAM        MOMETASONE (NASONEX) 50 MCG/ACTUATION NASAL SPRAY    INSTILL 2 SPRAY IN EACH NOSTRIL EVERY DAY    PRAVASTATIN (PRAVACHOL) 20 MG TABLET    Take 1 tablet (20 mg total) by mouth nightly.    RANITIDINE (ZANTAC) 150 MG TABLET    Take 1 tablet (150 mg total) by mouth nightly.    ZOLPIDEM (AMBIEN) 10 MG TAB    TAKE 1 TABLET BY MOUTH EVERY NIGHT AT BEDTIME   Changed and/or Refilled Medications    Modified Medication Previous Medication    HYDROCODONE-ACETAMINOPHEN 5-325MG (NORCO) 5-325 MG PER TABLET hydrocodone-acetaminophen 5-325mg (NORCO) 5-325 mg per tablet       Take 1 tablet by mouth 2 (two) times daily  as needed for Pain.    Take 1 tablet by mouth 2 (two) times daily as needed for Pain.   Discontinued Medications    AZELASTINE (ASTELIN) 137 MCG (0.1 %) NASAL SPRAY        BIOTIN 1 MG TABLET    Take 1,000 mcg by mouth once daily. 5000 mg every day    CALCIUM-VITAMIN D3 500 MG(1,250MG) -200 UNIT PER TABLET    Take 1 tablet by mouth 2 (two) times daily with meals.    CARVEDILOL (COREG) 6.25 MG TABLET    TAKE 1 TABLET(6.25 MG) BY MOUTH TWICE DAILY WITH MEALS    DOXYCYCLINE (MONODOX) 100 MG CAPSULE    Take 1 capsule (100 mg total) by mouth 2 (two) times daily.    METHYLPREDNISOLONE (MEDROL DOSEPACK) 4 MG TABLET    use as directed    PREDNISONE (DELTASONE) 10 MG TABLET    Take 4 tabs daily for 2 days then Take 3 tabs daily for 2 days thenTake 2 tabs daily for 2 days then Take 1 tab daily for 2 days then stop

## 2017-07-24 ENCOUNTER — TELEPHONE (OUTPATIENT)
Dept: PODIATRY | Facility: CLINIC | Age: 72
End: 2017-07-24

## 2017-07-24 NOTE — TELEPHONE ENCOUNTER
----- Message from Brianna Mac sent at 7/24/2017 10:54 AM CDT -----  Contact: pt   Not able to wear brace   Call back  [phone]

## 2017-07-24 NOTE — TELEPHONE ENCOUNTER
"Patient stated that she is having a lot of trouble wearing the boot - she has been wearing it on and off due to it causing pain.  Stated that the "bones on the inside of her foot are sticking out more".  Throwing her back, hips, etc out.  She has been wearing a tennis shoe on the other foot.  Asking if she should quit wearing the boot and just do RICE.  Please advise.  "

## 2017-07-26 NOTE — TELEPHONE ENCOUNTER
Per Dr Maguire:  Patient informed that she should continue RICE (rest, ice, compress and elevate), wear her arch supports and tennis shoes.  Patient verbalized understanding.

## 2017-07-26 NOTE — TELEPHONE ENCOUNTER
Patient asking if she should go to physical therapy - and if so asking for orders.  Please advise.

## 2017-07-26 NOTE — TELEPHONE ENCOUNTER
Patient informed that physical therapy will help.  Order signed by Dr Maguire (from last visit).  Patient stated that she already has the order.

## 2017-07-31 ENCOUNTER — LAB VISIT (OUTPATIENT)
Dept: LAB | Facility: HOSPITAL | Age: 72
End: 2017-07-31
Attending: FAMILY MEDICINE
Payer: MEDICARE

## 2017-07-31 DIAGNOSIS — Z11.59 NEED FOR HEPATITIS C SCREENING TEST: ICD-10-CM

## 2017-07-31 DIAGNOSIS — I10 ESSENTIAL HYPERTENSION: ICD-10-CM

## 2017-07-31 LAB
ALBUMIN SERPL BCP-MCNC: 3.9 G/DL
ALP SERPL-CCNC: 95 U/L
ALT SERPL W/O P-5'-P-CCNC: 15 U/L
ANION GAP SERPL CALC-SCNC: 9 MMOL/L
AST SERPL-CCNC: 23 U/L
BILIRUB SERPL-MCNC: 0.5 MG/DL
BUN SERPL-MCNC: 9 MG/DL
CALCIUM SERPL-MCNC: 9.3 MG/DL
CHLORIDE SERPL-SCNC: 101 MMOL/L
CHOLEST/HDLC SERPL: 3.2 {RATIO}
CO2 SERPL-SCNC: 26 MMOL/L
CREAT SERPL-MCNC: 0.9 MG/DL
EST. GFR  (AFRICAN AMERICAN): >60 ML/MIN/1.73 M^2
EST. GFR  (NON AFRICAN AMERICAN): >60 ML/MIN/1.73 M^2
GLUCOSE SERPL-MCNC: 92 MG/DL
HDL/CHOLESTEROL RATIO: 31.2 %
HDLC SERPL-MCNC: 218 MG/DL
HDLC SERPL-MCNC: 68 MG/DL
LDLC SERPL CALC-MCNC: 129 MG/DL
NONHDLC SERPL-MCNC: 150 MG/DL
POTASSIUM SERPL-SCNC: 4.2 MMOL/L
PROT SERPL-MCNC: 7.4 G/DL
SODIUM SERPL-SCNC: 136 MMOL/L
TRIGL SERPL-MCNC: 105 MG/DL

## 2017-07-31 PROCEDURE — 36415 COLL VENOUS BLD VENIPUNCTURE: CPT | Mod: PO

## 2017-07-31 PROCEDURE — 80061 LIPID PANEL: CPT

## 2017-07-31 PROCEDURE — 86803 HEPATITIS C AB TEST: CPT

## 2017-07-31 PROCEDURE — 80053 COMPREHEN METABOLIC PANEL: CPT

## 2017-08-01 LAB — HCV AB SERPL QL IA: NEGATIVE

## 2017-08-18 ENCOUNTER — SURGERY (OUTPATIENT)
Age: 72
End: 2017-08-18

## 2017-08-18 ENCOUNTER — ANESTHESIA (OUTPATIENT)
Dept: ENDOSCOPY | Facility: HOSPITAL | Age: 72
End: 2017-08-18
Payer: MEDICARE

## 2017-08-18 ENCOUNTER — HOSPITAL ENCOUNTER (OUTPATIENT)
Facility: HOSPITAL | Age: 72
Discharge: HOME | End: 2017-08-18
Attending: INTERNAL MEDICINE | Admitting: INTERNAL MEDICINE
Payer: MEDICARE

## 2017-08-18 ENCOUNTER — ANESTHESIA EVENT (OUTPATIENT)
Dept: ENDOSCOPY | Facility: HOSPITAL | Age: 72
End: 2017-08-18
Payer: MEDICARE

## 2017-08-18 VITALS — RESPIRATION RATE: 31 BRPM

## 2017-08-18 DIAGNOSIS — Z12.11 COLON CANCER SCREENING: ICD-10-CM

## 2017-08-18 PROCEDURE — 37000009 HC ANESTHESIA EA ADD 15 MINS: Mod: PO | Performed by: INTERNAL MEDICINE

## 2017-08-18 PROCEDURE — 45385 COLONOSCOPY W/LESION REMOVAL: CPT | Mod: PT,,, | Performed by: INTERNAL MEDICINE

## 2017-08-18 PROCEDURE — 27201089 HC SNARE, DISP (ANY): Mod: PO | Performed by: INTERNAL MEDICINE

## 2017-08-18 PROCEDURE — D9220A PRA ANESTHESIA: Mod: ANES,,, | Performed by: ANESTHESIOLOGY

## 2017-08-18 PROCEDURE — 88305 TISSUE EXAM BY PATHOLOGIST: CPT | Performed by: PATHOLOGY

## 2017-08-18 PROCEDURE — 25000003 PHARM REV CODE 250: Mod: PO | Performed by: INTERNAL MEDICINE

## 2017-08-18 PROCEDURE — 45381 COLONOSCOPY SUBMUCOUS NJX: CPT | Mod: 51,,, | Performed by: INTERNAL MEDICINE

## 2017-08-18 PROCEDURE — D9220A PRA ANESTHESIA: Mod: CRNA,,,

## 2017-08-18 PROCEDURE — 27201028 HC NEEDLE, SCLERO: Mod: PO | Performed by: INTERNAL MEDICINE

## 2017-08-18 PROCEDURE — 63600175 PHARM REV CODE 636 W HCPCS: Mod: PO | Performed by: NURSE ANESTHETIST, CERTIFIED REGISTERED

## 2017-08-18 PROCEDURE — 88305 TISSUE EXAM BY PATHOLOGIST: CPT | Mod: 26,,, | Performed by: PATHOLOGY

## 2017-08-18 PROCEDURE — 37000008 HC ANESTHESIA 1ST 15 MINUTES: Mod: PO | Performed by: INTERNAL MEDICINE

## 2017-08-18 PROCEDURE — 45381 COLONOSCOPY SUBMUCOUS NJX: CPT | Mod: PO | Performed by: INTERNAL MEDICINE

## 2017-08-18 PROCEDURE — 45385 COLONOSCOPY W/LESION REMOVAL: CPT | Mod: PO | Performed by: INTERNAL MEDICINE

## 2017-08-18 RX ORDER — PROPOFOL 10 MG/ML
VIAL (ML) INTRAVENOUS
Status: DISCONTINUED | OUTPATIENT
Start: 2017-08-18 | End: 2017-08-18

## 2017-08-18 RX ORDER — LIDOCAINE HCL/PF 100 MG/5ML
SYRINGE (ML) INTRAVENOUS
Status: DISCONTINUED | OUTPATIENT
Start: 2017-08-18 | End: 2017-08-18

## 2017-08-18 RX ORDER — SODIUM CHLORIDE, SODIUM LACTATE, POTASSIUM CHLORIDE, CALCIUM CHLORIDE 600; 310; 30; 20 MG/100ML; MG/100ML; MG/100ML; MG/100ML
INJECTION, SOLUTION INTRAVENOUS CONTINUOUS
Status: DISCONTINUED | OUTPATIENT
Start: 2017-08-18 | End: 2017-08-18 | Stop reason: HOSPADM

## 2017-08-18 RX ADMIN — PROPOFOL 30 MG: 10 INJECTION, EMULSION INTRAVENOUS at 11:08

## 2017-08-18 RX ADMIN — PROPOFOL 40 MG: 10 INJECTION, EMULSION INTRAVENOUS at 10:08

## 2017-08-18 RX ADMIN — LIDOCAINE HYDROCHLORIDE 75 MG: 20 INJECTION, SOLUTION INTRAVENOUS at 10:08

## 2017-08-18 RX ADMIN — SODIUM CHLORIDE, SODIUM LACTATE, POTASSIUM CHLORIDE, AND CALCIUM CHLORIDE: .6; .31; .03; .02 INJECTION, SOLUTION INTRAVENOUS at 10:08

## 2017-08-18 RX ADMIN — PROPOFOL 30 MG: 10 INJECTION, EMULSION INTRAVENOUS at 10:08

## 2017-08-18 RX ADMIN — PROPOFOL 100 MG: 10 INJECTION, EMULSION INTRAVENOUS at 10:08

## 2017-08-18 NOTE — PLAN OF CARE
Vss, rob po fluids, denies pain, ambulates easily.  States ready to go home.  Discharged from facility with family.

## 2017-08-18 NOTE — ANESTHESIA POSTPROCEDURE EVALUATION
"Anesthesia Post Evaluation    Patient: Luis Miguel Murcia    Procedure(s) Performed: Procedure(s) (LRB):  COLONOSCOPY (N/A)    Final Anesthesia Type: general  Patient location during evaluation: PACU  Patient participation: Yes- Able to Participate  Level of consciousness: awake and alert and oriented  Post-procedure vital signs: reviewed and stable  Pain management: adequate  Airway patency: patent  PONV status at discharge: No PONV  Anesthetic complications: no      Cardiovascular status: blood pressure returned to baseline  Respiratory status: unassisted, spontaneous ventilation and room air  Hydration status: euvolemic  Follow-up not needed.        Visit Vitals  BP (!) 114/59 (BP Location: Left arm, Patient Position: Lying)   Pulse 67   Temp 36.3 °C (97.4 °F) (Skin)   Resp 18   Ht 5' 2" (1.575 m)   Wt 54.4 kg (120 lb)   SpO2 99%   Breastfeeding? No   BMI 21.95 kg/m²       Pain/Dalila Score: Pain Assessment Performed: Yes (8/18/2017 11:39 AM)  Presence of Pain: denies (8/18/2017 11:39 AM)  Dalila Score: 10 (8/18/2017 11:39 AM)      "

## 2017-08-18 NOTE — ANESTHESIA PREPROCEDURE EVALUATION
08/18/2017  Luis Miguel Murcia is a 72 y.o., female.    Anesthesia Evaluation    I have reviewed the Patient Summary Reports.    I have reviewed the Nursing Notes.   I have reviewed the Medications.     Review of Systems  Cardiovascular:   Hypertension CHF    Musculoskeletal:   Arthritis     Neurological:   CVA Neuromuscular Disease, Headaches           Anesthesia Plan  Type of Anesthesia, risks & benefits discussed:  Anesthesia Type:  general  Patient's Preference:   Intra-op Monitoring Plan:   Intra-op Monitoring Plan Comments:   Post Op Pain Control Plan:   Post Op Pain Control Plan Comments:   Induction:   IV  Beta Blocker:  Patient is on a Beta-Blocker and has received one dose within the past 24 hours (No further documentation required).       Informed Consent: Patient understands risks and agrees with Anesthesia plan.  Questions answered. Anesthesia consent signed with patient.  ASA Score: 4     Day of Surgery Review of History & Physical: I have interviewed and examined the patient. I have reviewed the patient's H&P dated:  There are no significant changes.          Ready For Surgery From Anesthesia Perspective.
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
## 2017-08-18 NOTE — TRANSFER OF CARE
"Anesthesia Transfer of Care Note    Patient: Luis Miguel Murcia    Procedure(s) Performed: Procedure(s) (LRB):  COLONOSCOPY (N/A)    Patient location: PACU    Anesthesia Type: general    Transport from OR: Transported from OR on room air with adequate spontaneous ventilation    Post pain: adequate analgesia    Post assessment: no apparent anesthetic complications    Post vital signs: stable    Level of consciousness: awake    Nausea/Vomiting: no nausea/vomiting    Complications: none    Transfer of care protocol was followed      Last vitals:   Visit Vitals  BP (!) 105/54 (BP Location: Left arm, Patient Position: Lying)   Pulse (!) 57   Temp 36.3 °C (97.4 °F) (Skin)   Resp 18   Ht 5' 2" (1.575 m)   Wt 54.4 kg (120 lb)   SpO2 100%   Breastfeeding? No   BMI 21.95 kg/m²     "

## 2017-08-18 NOTE — DISCHARGE INSTRUCTIONS
Understanding Colon and Rectal Polyps    The colon (also called the large intestine) is a muscular tube that forms the last part of the digestive tract. It absorbs water and stores food waste. The colon is about 4 to 6 feet long. The rectum is the last 6 inches of the colon. The colon and rectum have a smooth lining composed of millions of cells. Changes in these cells can lead to growths in the colon that can become cancerous and should be removed. Multiple tests are available to screen for colon cancer, but the colonoscopy is the most recommended test. During colonoscopy, these polyps can be removed. How often you need this test depends on many things including your condition, your family history, symptoms, and what the findings were at the previous colonoscopy.   When the colon lining changes  Changes that happen in the cells that line the colon or rectum can lead to growths called polyps. Over a period of years, polyps can turn cancerous. Removing polyps early may prevent cancer from ever forming.  Polyps  Polyps are fleshy clumps of tissue that form on the lining of the colon or rectum. Small polyps are usually benign (not cancerous). However, over time, cells in a polyp can change and become cancerous. Certain types of polyps known as adenomatous polyps are premalignant. The risk for invasive cancer increases with the size of the polyp and certain cell and gene features. This means that they can become cancerous if they're not removed. Hyperplastic polyps are benign. They can grow quite large and not turn cancerous.   Cancer  Almost all colorectal cancers start when polyp cells begin growing abnormally. As a cancerous tumor grows, it may involve more and more of the colon or rectum. In time, cancer can also grow beyond the colon or rectum and spread to nearby organs or to glands called lymph nodes. The cells can also travel to other parts of the body. This is known as metastasis. The earlier a cancerous  tumor is removed, the better the chance of preventing its spread.    Date Last Reviewed: 8/1/2016  © 0342-0443 [employer]. [address] [place] [address]. All rights reserved. This information is not intended as a substitute for professional medical care. Always follow your healthcare professional's instructions.        Procedural Sedation (Adult)  You have been given medicine by vein to make you sleep during your surgery. This may have included both a pain medicine and sleeping medicine. Most of the effects have worn off. But you may still have some drowsiness for the next 6 to 8 hours.  Home care  Follow these guidelines when you get home:  · For the next 8 hours, you should be watched by a responsible adult. This person should make sure your condition is not getting worse.  · Don't take any medicine by mouth for pain or for sleep during the next 4 hours. These might react with the medicines you were given in the hospital. This could cause a much stronger response than usual.  · Don't drink any alcohol for the next 24 hours.  · Don't drive, operate dangerous machinery, or make important business or personal decisions during the next 24 hours.  Follow-up care  Follow up with your healthcare provider if you are not alert and back to your usual level of activity within 12 hours.  When to seek medical advice  Call your healthcare provider right away if any of these occur:  · Drowsiness gets worse  · Weakness or dizziness gets worse  · Repeated vomiting  · You cannot be awakened   Date Last Reviewed: 10/18/2016  © 6929-5858 [employer]. [address] [place] [address]. All rights reserved. This information is not intended as a substitute for professional medical care. Always follow your healthcare professional's instructions.

## 2017-08-21 VITALS
DIASTOLIC BLOOD PRESSURE: 59 MMHG | OXYGEN SATURATION: 99 % | SYSTOLIC BLOOD PRESSURE: 114 MMHG | TEMPERATURE: 97 F | HEIGHT: 62 IN | WEIGHT: 120 LBS | RESPIRATION RATE: 18 BRPM | BODY MASS INDEX: 22.08 KG/M2 | HEART RATE: 67 BPM

## 2017-08-21 RX ORDER — MOMETASONE FUROATE 50 MCG
AEROSOL, SPRAY WITH PUMP (GRAM) NASAL
Qty: 17 G | Refills: 5 | Status: SHIPPED | OUTPATIENT
Start: 2017-08-21 | End: 2017-10-18 | Stop reason: SDUPTHER

## 2017-08-31 RX ORDER — ZOLPIDEM TARTRATE 10 MG/1
TABLET ORAL
Qty: 30 TABLET | Refills: 5 | Status: SHIPPED | OUTPATIENT
Start: 2017-08-31 | End: 2018-01-23 | Stop reason: SDUPTHER

## 2017-09-28 ENCOUNTER — HOSPITAL ENCOUNTER (OUTPATIENT)
Dept: RADIOLOGY | Facility: HOSPITAL | Age: 72
Discharge: HOME | End: 2017-09-28
Attending: FAMILY MEDICINE
Payer: MEDICARE

## 2017-09-28 DIAGNOSIS — Z12.31 ENCOUNTER FOR SCREENING MAMMOGRAM FOR MALIGNANT NEOPLASM OF BREAST: ICD-10-CM

## 2017-09-28 PROCEDURE — 77067 SCR MAMMO BI INCL CAD: CPT | Mod: TC

## 2017-09-28 PROCEDURE — 77067 SCR MAMMO BI INCL CAD: CPT | Mod: 26,,, | Performed by: RADIOLOGY

## 2017-09-28 PROCEDURE — 77063 BREAST TOMOSYNTHESIS BI: CPT | Mod: 26,,, | Performed by: RADIOLOGY

## 2017-10-03 ENCOUNTER — OFFICE VISIT (OUTPATIENT)
Dept: DERMATOLOGY | Facility: CLINIC | Age: 72
End: 2017-10-03
Payer: MEDICARE

## 2017-10-03 VITALS — HEIGHT: 62 IN | WEIGHT: 120 LBS | BODY MASS INDEX: 22.08 KG/M2

## 2017-10-03 DIAGNOSIS — D48.5 NEOPLASM OF UNCERTAIN BEHAVIOR OF SKIN: ICD-10-CM

## 2017-10-03 DIAGNOSIS — Z87.2 HISTORY OF ACTINIC KERATOSES: Primary | ICD-10-CM

## 2017-10-03 DIAGNOSIS — L30.9 ECZEMA, UNSPECIFIED TYPE: ICD-10-CM

## 2017-10-03 DIAGNOSIS — L82.0 INFLAMED SEBORRHEIC KERATOSIS: ICD-10-CM

## 2017-10-03 DIAGNOSIS — L82.1 SEBORRHEIC KERATOSES: ICD-10-CM

## 2017-10-03 DIAGNOSIS — Z12.83 SKIN CANCER SCREENING: ICD-10-CM

## 2017-10-03 PROCEDURE — 99214 OFFICE O/P EST MOD 30 MIN: CPT | Mod: 25,S$PBB,, | Performed by: DERMATOLOGY

## 2017-10-03 PROCEDURE — 99212 OFFICE O/P EST SF 10 MIN: CPT | Mod: PBBFAC,PO,25 | Performed by: DERMATOLOGY

## 2017-10-03 PROCEDURE — 11100 PR BIOPSY OF SKIN LESION: CPT | Mod: 59,PBBFAC,PO | Performed by: DERMATOLOGY

## 2017-10-03 PROCEDURE — 11100 PR BIOPSY OF SKIN LESION: CPT | Mod: 59,S$PBB,, | Performed by: DERMATOLOGY

## 2017-10-03 PROCEDURE — 17110 DESTRUCTION B9 LES UP TO 14: CPT | Mod: S$PBB,,, | Performed by: DERMATOLOGY

## 2017-10-03 PROCEDURE — 17110 DESTRUCTION B9 LES UP TO 14: CPT | Mod: PBBFAC,PO | Performed by: DERMATOLOGY

## 2017-10-03 PROCEDURE — 99999 PR PBB SHADOW E&M-EST. PATIENT-LVL II: CPT | Mod: PBBFAC,,, | Performed by: DERMATOLOGY

## 2017-10-03 PROCEDURE — 88305 TISSUE EXAM BY PATHOLOGIST: CPT | Performed by: PATHOLOGY

## 2017-10-03 RX ORDER — TRIAMCINOLONE ACETONIDE 1 MG/G
CREAM TOPICAL
Qty: 60 G | Refills: 3 | Status: SHIPPED | OUTPATIENT
Start: 2017-10-03 | End: 2018-10-05 | Stop reason: SDUPTHER

## 2017-10-03 NOTE — PROGRESS NOTES
Subjective:       Patient ID:  Luis Miguel Murcia is a 72 y.o. female who presents for   Chief Complaint   Patient presents with    Skin Check    Growth     Pt here for annual skin check. Last seen 7-  Rash on neck, legs, and back last month- felt crusty & itchy- treated with Aloe Vera cream- Rash has resolved  Growths on neck for 1 month- itchy & gets irritated by her jewelry- not treated  Aks treated with cryo in the past    Recent Pathology   FINAL PATHOLOGIC DIAGNOSIS  Skin, left neck, shave biopsy:  - SEBORRHEIC KERATOSIS.  MICROSCOPIC DESCRIPTION: Sections show an abrupt proliferation of benign basaloid keratinocytes exhibiting  acanthosis, hyperkeratosis, papillomatosis and horn pseudocyst formation.  Diagnosed by: Stormy Solano M.D.  (Electronically Signed: 2016-07-21 14:36:01)  Gross Description  AP/-[id number]  Received in formalin, labeled left neck, is a 1.3 cm shave biopsy of nonpigmented skin. The skin surface is  lobulated and firm. Entirely submitted.  Davida Lai  Page 1     No phx of skin ca  No fhx of skin ca             Review of Systems   Skin: Positive for activity-related sunscreen use. Negative for daily sunscreen use and recent sunburn.        Objective:    Physical Exam   Constitutional: She appears well-developed and well-nourished. No distress.   HENT:   Mouth/Throat: Lips normal.    Eyes: Lids are normal.  No conjunctival no injection.   Cardiovascular: There is no local extremity swelling and no dependent edema.     Neurological: She is alert and oriented to person, place, and time. She is not disoriented.   Psychiatric: She has a normal mood and affect.   Skin:   Areas Examined (abnormalities noted in diagram):   Head / Face Inspection Performed  Neck Inspection Performed  Chest / Axilla Inspection Performed  Abdomen Inspection Performed  Back Inspection Performed  RUE Inspected  LUE Inspection Performed  RLE Inspected  LLE Inspection Performed                    Diagram Legend     Erythematous scaling macule/papule c/w actinic keratosis       Vascular papule c/w angioma      Pigmented verrucoid papule/plaque c/w seborrheic keratosis      Yellow umbilicated papule c/w sebaceous hyperplasia      Irregularly shaped tan macule c/w lentigo     1-2 mm smooth white papules consistent with Milia      Movable subcutaneous cyst with punctum c/w epidermal inclusion cyst      Subcutaneous movable cyst c/w pilar cyst      Firm pink to brown papule c/w dermatofibroma      Pedunculated fleshy papule(s) c/w skin tag(s)      Evenly pigmented macule c/w junctional nevus     Mildly variegated pigmented, slightly irregular-bordered macule c/w mildly atypical nevus      Flesh colored to evenly pigmented papule c/w intradermal nevus       Pink pearly papule/plaque c/w basal cell carcinoma      Erythematous hyperkeratotic cursted plaque c/w SCC      Surgical scar with no sign of skin cancer recurrence      Open and closed comedones      Inflammatory papules and pustules      Verrucoid papule consistent consistent with wart     Erythematous eczematous patches and plaques     Dystrophic onycholytic nail with subungual debris c/w onychomycosis     Umbilicated papule    Erythematous-base heme-crusted tan verrucoid plaque consistent with inflamed seborrheic keratosis     Erythematous Silvery Scaling Plaque c/w Psoriasis     See annotation      Assessment / Plan:      Pathology Orders:      Normal Orders This Visit    Tissue Specimen To Pathology, Dermatology     Questions:    Directional Terms:  Other(comment)    Clinical information:  sk vs other    Specific Site:  neck        History of actinic keratoses  Area(s) of previous AKs evaluated with no signs of recurrence.    Upper body skin examination performed today including at least 6 points as noted in physical examination. No lesions suspicious for malignancy noted.      Skin cancer screening  Upper body skin examination performed today including  at least 6 points as noted in physical examination. No lesions suspicious for malignancy noted.        Neoplasm of uncertain behavior of skin  -     Tissue Specimen To Pathology, Dermatology    Shave biopsy procedure note:    Shave biopsy performed after verbal consent including risk of infection, scar, recurrence, need for additional treatment of site. Area prepped with alcohol, anesthetized with approximately 1.0cc of 1% lidocaine with epinephrine. Lesional tissue shaved with razor blade. Hemostasis achieved with application of aluminum chloride followed by hyfrecation. No complications. Dressing applied. Wound care explained.        Seborrheic keratoses, trunk and extremities  These are benign inherited growths without a malignant potential. Reassurance given to patient. No treatment is necessary.   $300 for removal of up to 14 lesions    Inflamed seborrheic keratosis, left arm  Cryosurgery procedure note:    Verbal consent from the patient is obtained. Liquid nitrogen cryosurgery is applied to 2 lesions to produce a freeze injury. The patient is aware that blisters may form and is instructed on wound care with gentle cleansing and use of vaseline ointment to keep moist until healed. The patient is supplied a handout on cryosurgery and is instructed to call if lesions do not completely resolve. Risk of dyspigmentation discussed.       Eczema, unspecified type  Not flared  Call if recurs and not improved with TAC  -     triamcinolone acetonide 0.1% (KENALOG) 0.1 % cream; AAA bid prn rash  Dispense: 60 g; Refill: 3             Return in about 6 months (around 4/3/2018).

## 2017-10-18 ENCOUNTER — OFFICE VISIT (OUTPATIENT)
Dept: FAMILY MEDICINE | Facility: CLINIC | Age: 72
End: 2017-10-18
Payer: MEDICARE

## 2017-10-18 VITALS
HEIGHT: 62 IN | OXYGEN SATURATION: 96 % | BODY MASS INDEX: 23 KG/M2 | WEIGHT: 125 LBS | HEART RATE: 59 BPM | DIASTOLIC BLOOD PRESSURE: 60 MMHG | SYSTOLIC BLOOD PRESSURE: 110 MMHG

## 2017-10-18 DIAGNOSIS — I10 ESSENTIAL HYPERTENSION: Primary | ICD-10-CM

## 2017-10-18 DIAGNOSIS — M54.16 LUMBAR RADICULOPATHY: ICD-10-CM

## 2017-10-18 DIAGNOSIS — F41.9 ANXIETY: ICD-10-CM

## 2017-10-18 DIAGNOSIS — Z79.891 CHRONIC USE OF OPIATE DRUGS THERAPEUTIC PURPOSES: ICD-10-CM

## 2017-10-18 DIAGNOSIS — M47.812 CERVICAL SPONDYLOSIS WITHOUT MYELOPATHY: ICD-10-CM

## 2017-10-18 DIAGNOSIS — J44.9 CHRONIC OBSTRUCTIVE PULMONARY DISEASE, UNSPECIFIED COPD TYPE: ICD-10-CM

## 2017-10-18 PROCEDURE — 99213 OFFICE O/P EST LOW 20 MIN: CPT | Mod: PBBFAC,PO | Performed by: FAMILY MEDICINE

## 2017-10-18 PROCEDURE — 99214 OFFICE O/P EST MOD 30 MIN: CPT | Mod: S$PBB,,, | Performed by: FAMILY MEDICINE

## 2017-10-18 PROCEDURE — 99999 PR PBB SHADOW E&M-EST. PATIENT-LVL III: CPT | Mod: PBBFAC,,, | Performed by: FAMILY MEDICINE

## 2017-10-18 RX ORDER — HYDROCODONE BITARTRATE AND ACETAMINOPHEN 5; 325 MG/1; MG/1
1 TABLET ORAL 2 TIMES DAILY PRN
Qty: 60 TABLET | Refills: 0 | Status: SHIPPED | OUTPATIENT
Start: 2017-11-23 | End: 2017-12-23

## 2017-10-18 RX ORDER — HYDROCODONE BITARTRATE AND ACETAMINOPHEN 5; 325 MG/1; MG/1
1 TABLET ORAL 2 TIMES DAILY PRN
Qty: 60 TABLET | Refills: 0 | Status: SHIPPED | OUTPATIENT
Start: 2017-10-24 | End: 2017-11-23

## 2017-10-18 RX ORDER — HYDROCODONE BITARTRATE AND ACETAMINOPHEN 5; 325 MG/1; MG/1
1 TABLET ORAL 2 TIMES DAILY PRN
Qty: 60 TABLET | Refills: 0 | Status: SHIPPED | OUTPATIENT
Start: 2017-12-23 | End: 2018-01-23 | Stop reason: SDUPTHER

## 2017-10-18 NOTE — PROGRESS NOTES
Subjective:       Patient ID: Luis Miguel Murcia is a 72 y.o. female.    Chief Complaint: Results    HPI       htn is stable. No c/o cp or sob at rest or exertion. No headaches.      C/o chronic right lumbar radicular pain > 1 year. Sees pain management. Ps: 2-3/10 while on norco.     Also, has chronic neck pain > 1 year. Sees pain management. Ps: 2-3/10 while on norco     Imaging:  C-spine MRI 9/30/10: There is anterolisthesis of C3 on 4 and 4 on 5 mile, retrolisthesis of C5 and 6. At C3/4 there is a mild bulge with foraminal narrowing bilaterally mild. At C5-C6 there is a disc bulge with right upper protrusion of the disc but severe foraminal narrowing on the right and moderate to severe on the left. There is disc bulging at C6/7 with a bulge more to the right.     MRI L-spine 5/2011: At L3/4 there is mild anterolisthesis, Modic endplate changes. There is bilateral severe facet arthropathy and a broad-based disc bulge more to the left with compression of the descending L4 nerve root and slight effacement of the L3 nerve root at that level. At L4-5 there is bilateral facet arthropathy. At L5/S1 there is mild disc bulge and arthropathy. There is also Ligamentous hypertrophy at L4/5 posteriorly causing some narrowing of the central canal.     Anxiety stable while taking xanax.     Copd stable. No worsening cough or wheeze.    Insomnia stable while on ambien.      Review of Systems      Review of Systems   Constitutional: Negative for fever and chills.   HENT: Negative for hearing loss and neck stiffness.    Eyes: Negative for redness and itching.   Respiratory: Negative for cough and choking.    Cardiovascular: Negative for chest pain and leg swelling.  Abdomen: Negative for abdominal pain and blood in stool.   Genitourinary: Negative for dysuria and flank pain.   Neurological: Negative for light-headedness and headaches.   Hematological: Negative for adenopathy.   Psychiatric/Behavioral: Negative for behavioral  problems.     Objective:      Physical Exam   Constitutional: She appears well-developed.   HENT:   Head: Normocephalic and atraumatic.   Eyes: Conjunctivae are normal. Pupils are equal, round, and reactive to light.   Neck: Normal range of motion.   Cardiovascular: Normal rate and regular rhythm.    No murmur heard.  Pulmonary/Chest: Effort normal and breath sounds normal. She has no wheezes.   Musculoskeletal:   lumbar spine: pos tenderness of bilat paravert area.  Right slr to 80 degrees reproduces bilat lower pain.  Left slr to 80 degrees reproduces bilat lower pain.     Cervical spine: tend of bilat paravert area.  Dec rom with flex/ext.       Lymphadenopathy:     She has no cervical adenopathy.       Assessment:       1. Essential hypertension    2. Lumbar radiculopathy    3. Chronic use of opiate drugs therapeutic purposes    4. Anxiety    5. Cervical spondylosis without myelopathy    6. Chronic obstructive pulmonary disease, unspecified COPD type        Plan:       Essential hypertension    Lumbar radiculopathy    Chronic use of opiate drugs therapeutic purposes    Anxiety    Cervical spondylosis without myelopathy    Chronic obstructive pulmonary disease, unspecified COPD type    Other orders  -     hydrocodone-acetaminophen 5-325mg (NORCO) 5-325 mg per tablet; Take 1 tablet by mouth 2 (two) times daily as needed for Pain.  Dispense: 60 tablet; Refill: 0  -     hydrocodone-acetaminophen 5-325mg (NORCO) 5-325 mg per tablet; Take 1 tablet by mouth 2 (two) times daily as needed for Pain.  Dispense: 60 tablet; Refill: 0  -     hydrocodone-acetaminophen 5-325mg (NORCO) 5-325 mg per tablet; Take 1 tablet by mouth 2 (two) times daily as needed for Pain.  Dispense: 60 tablet; Refill: 0            Plan:  rf norco 5  Cont all other meds      Medication List with Changes/Refills   New Medications    HYDROCODONE-ACETAMINOPHEN 5-325MG (NORCO) 5-325 MG PER TABLET    Take 1 tablet by mouth 2 (two) times daily as needed  for Pain.    HYDROCODONE-ACETAMINOPHEN 5-325MG (NORCO) 5-325 MG PER TABLET    Take 1 tablet by mouth 2 (two) times daily as needed for Pain.    HYDROCODONE-ACETAMINOPHEN 5-325MG (NORCO) 5-325 MG PER TABLET    Take 1 tablet by mouth 2 (two) times daily as needed for Pain.   Current Medications    ALBUTEROL 90 MCG/ACTUATION INHALER    Inhale 2 puffs into the lungs every 6 (six) hours as needed for Wheezing.    ALPRAZOLAM (XANAX) 0.25 MG TABLET    Take 1 tablet (0.25 mg total) by mouth 2 (two) times daily. as needed for anxiety.    AMLODIPINE (NORVASC) 5 MG TABLET    Take 1 tablet (5 mg total) by mouth once daily.    ASCORBATE CALCIUM (VITAMIN C ORAL)    Take 1,000 mg by mouth once daily.     ASPIRIN (ECOTRIN) 81 MG EC TABLET    Take 81 mg by mouth once daily.    AZELASTINE (ASTELIN) 137 MCG (0.1 %) NASAL SPRAY    PLACE 2 SPRAYS IN EACH NOSTRIL EVERY DAY; MAY USE UP TO TWICE DAILY DEPENDING ON SYMPTOMS    B COMPLEX VITAMINS TABLET    Take 1 tablet by mouth once daily.      CARVEDILOL (COREG) 12.5 MG TABLET    Take 1 tablet (12.5 mg total) by mouth 2 (two) times daily with meals.    DEXLANSOPRAZOLE (DEXILANT) 60 MG CAPSULE    Take 1 capsule (60 mg total) by mouth every morning. before breakfast.    DOCOSAHEXANOIC ACID/EPA (FISH OIL ORAL)    Take 1 capsule by mouth once daily.      LIDOCAINE-PRILOCAINE (EMLA) CREAM        MOMETASONE (NASONEX) 50 MCG/ACTUATION NASAL SPRAY    INSTILL 2 SPRAY IN EACH NOSTRIL EVERY DAY    RANITIDINE (ZANTAC) 150 MG TABLET    Take 1 tablet (150 mg total) by mouth nightly.    TRIAMCINOLONE ACETONIDE 0.1% (KENALOG) 0.1 % CREAM    AAA bid prn rash    ZOLPIDEM (AMBIEN) 10 MG TAB    TAKE 1 TABLET BY MOUTH EVERY NIGHT AT BEDTIME   Discontinued Medications    NASONEX 50 MCG/ACTUATION NASAL SPRAY    SPRAY 2 SPRAYS IN EACH NOSTRIL EVERY DAY

## 2017-10-24 ENCOUNTER — TELEPHONE (OUTPATIENT)
Dept: OPHTHALMOLOGY | Facility: CLINIC | Age: 72
End: 2017-10-24

## 2017-10-24 NOTE — TELEPHONE ENCOUNTER
----- Message from Tash Greene sent at 10/24/2017 10:34 AM CDT -----  Contact: self  Patient would like a referral to a retina specialist. Please call patient at [phone]. Thanks!

## 2017-12-09 RX ORDER — ALPRAZOLAM 0.25 MG/1
TABLET ORAL
Qty: 60 TABLET | Refills: 0 | Status: SHIPPED | OUTPATIENT
Start: 2017-12-09 | End: 2018-01-07 | Stop reason: SDUPTHER

## 2018-01-08 ENCOUNTER — PES CALL (OUTPATIENT)
Dept: ADMINISTRATIVE | Facility: CLINIC | Age: 73
End: 2018-01-08

## 2018-01-08 NOTE — TELEPHONE ENCOUNTER
----- Message from Sathya Bishop sent at 1/8/2018  2:07 PM CST -----  Contact: patient  Patient called requesting refill on ALPRAZolam (XANAX) 0.25 MG tablet send to [facility] pharmacy. Please call back at [phone] when script has been sent.thanks,

## 2018-01-09 RX ORDER — ALPRAZOLAM 0.25 MG/1
TABLET ORAL
Qty: 60 TABLET | Refills: 0 | Status: SHIPPED | OUTPATIENT
Start: 2018-01-09 | End: 2018-01-23 | Stop reason: SDUPTHER

## 2018-01-09 NOTE — TELEPHONE ENCOUNTER
----- Message from Giana Dawn sent at 1/9/2018 11:59 AM CST -----  Contact: self   Patient needs a refill on medication ALPRAZolam (XANAX) 0.25 MG tablet       [facility] [address] [address] & [address] [address]  Phone: [phone] Fax: [phone]

## 2018-01-23 ENCOUNTER — OFFICE VISIT (OUTPATIENT)
Dept: FAMILY MEDICINE | Facility: CLINIC | Age: 73
End: 2018-01-23
Payer: MEDICARE

## 2018-01-23 VITALS
BODY MASS INDEX: 23 KG/M2 | OXYGEN SATURATION: 96 % | DIASTOLIC BLOOD PRESSURE: 68 MMHG | WEIGHT: 125 LBS | SYSTOLIC BLOOD PRESSURE: 102 MMHG | HEIGHT: 62 IN | HEART RATE: 73 BPM

## 2018-01-23 DIAGNOSIS — I10 ESSENTIAL HYPERTENSION: Primary | ICD-10-CM

## 2018-01-23 DIAGNOSIS — M47.812 CERVICAL SPONDYLOSIS WITHOUT MYELOPATHY: ICD-10-CM

## 2018-01-23 DIAGNOSIS — F41.9 ANXIETY: ICD-10-CM

## 2018-01-23 DIAGNOSIS — G47.00 INSOMNIA, UNSPECIFIED TYPE: ICD-10-CM

## 2018-01-23 DIAGNOSIS — M54.16 LUMBAR RADICULOPATHY: ICD-10-CM

## 2018-01-23 PROCEDURE — 99214 OFFICE O/P EST MOD 30 MIN: CPT | Mod: S$GLB,,, | Performed by: FAMILY MEDICINE

## 2018-01-23 PROCEDURE — 99999 PR PBB SHADOW E&M-EST. PATIENT-LVL III: CPT | Mod: PBBFAC,,, | Performed by: FAMILY MEDICINE

## 2018-01-23 RX ORDER — ZINC GLUCONATE 50 MG
1000 TABLET ORAL DAILY
COMMUNITY
Start: 2018-01-19

## 2018-01-23 RX ORDER — HYDROCODONE BITARTRATE AND ACETAMINOPHEN 5; 325 MG/1; MG/1
1 TABLET ORAL 2 TIMES DAILY PRN
Qty: 60 TABLET | Refills: 0 | Status: SHIPPED | OUTPATIENT
Start: 2018-03-26 | End: 2018-04-23 | Stop reason: SDUPTHER

## 2018-01-23 RX ORDER — PRAVASTATIN SODIUM 20 MG/1
20 TABLET ORAL DAILY
Refills: 3 | COMMUNITY
Start: 2017-12-06 | End: 2019-01-07 | Stop reason: SDUPTHER

## 2018-01-23 RX ORDER — ALPRAZOLAM 0.25 MG/1
TABLET ORAL
Qty: 60 TABLET | Refills: 5 | Status: SHIPPED | OUTPATIENT
Start: 2018-02-08 | End: 2018-07-20 | Stop reason: SDUPTHER

## 2018-01-23 RX ORDER — ZOLPIDEM TARTRATE 10 MG/1
TABLET ORAL
Qty: 30 TABLET | Refills: 5 | Status: SHIPPED | OUTPATIENT
Start: 2018-02-21 | End: 2018-04-23 | Stop reason: SDUPTHER

## 2018-01-23 RX ORDER — HYDROCODONE BITARTRATE AND ACETAMINOPHEN 5; 325 MG/1; MG/1
1 TABLET ORAL 2 TIMES DAILY PRN
Qty: 60 TABLET | Refills: 0 | Status: SHIPPED | OUTPATIENT
Start: 2018-02-24 | End: 2018-03-26

## 2018-01-23 RX ORDER — HYDROCODONE BITARTRATE AND ACETAMINOPHEN 5; 325 MG/1; MG/1
1 TABLET ORAL 2 TIMES DAILY PRN
Qty: 60 TABLET | Refills: 0 | Status: SHIPPED | OUTPATIENT
Start: 2018-01-25 | End: 2018-02-24

## 2018-01-23 NOTE — PROGRESS NOTES
Subjective:       Patient ID: Luis Miguel Murcia is a 72 y.o. female.    Chief Complaint: Hypertension    HPI     htn is stable. No c/o cp or sob at rest or exertion. No headaches.      C/o chronic right lumbar radicular pain > 1 year. Sees pain management. Ps: 2-3/10 while on norco       Also, has chronic neck pain > 1 year. Sees pain management. Ps: 2-3/10 while on norco       Imaging:  C-spine MRI 9/30/10: There is anterolisthesis of C3 on 4 and 4 on 5 mile, retrolisthesis of C5 and 6. At C3/4 there is a mild bulge with foraminal narrowing bilaterally mild. At C5-C6 there is a disc bulge with right upper protrusion of the disc but severe foraminal narrowing on the right and moderate to severe on the left. There is disc bulging at C6/7 with a bulge more to the right.     MRI L-spine 5/2011: At L3/4 there is mild anterolisthesis, Modic endplate changes. There is bilateral severe facet arthropathy and a broad-based disc bulge more to the left with compression of the descending L4 nerve root and slight effacement of the L3 nerve root at that level. At L4-5 there is bilateral facet arthropathy. At L5/S1 there is mild disc bulge and arthropathy. There is also Ligamentous hypertrophy at L4/5 posteriorly causing some narrowing of the central canal.     Anxiety stable while taking xanax.     Copd stable. No worsening cough or wheeze.     Insomnia stable while on ambien.          Review of Systems      Review of Systems   Constitutional: Negative for fever and chills.   HENT: Negative for hearing loss and neck stiffness.    Eyes: Negative for redness and itching.   Respiratory: Negative for choking.    Cardiovascular: Negative for chest pain and leg swelling.  Abdomen: Negative for abdominal pain and blood in stool.   Genitourinary: Negative for dysuria and flank pain.   Musculoskeletal: Negative for gait problem.   Neurological: Negative for light-headedness and headaches.   Hematological: Negative for adenopathy.    Psychiatric/Behavioral: Negative for behavioral problems.       Objective:      Physical Exam   Constitutional: She appears well-developed.   HENT:   Head: Normocephalic and atraumatic.   Eyes: Conjunctivae are normal. Pupils are equal, round, and reactive to light.   Neck: Normal range of motion.   Cardiovascular: Normal rate and regular rhythm.    No murmur heard.  Pulmonary/Chest: Effort normal and breath sounds normal.   Musculoskeletal:   lumbar spine: pos tenderness of bilat paravert area.  Right slr to 80 degrees reproduces bilat lower pain.  Left slr to 80 degrees reproduces bilat lower pain.     Cervical spine: tend of bilat paravert area.  Dec rom with flex/ext.   Lymphadenopathy:     She has no cervical adenopathy.       Assessment:       1. Essential hypertension    2. Cervical spondylosis without myelopathy    3. Lumbar radiculopathy    4. Anxiety    5. Insomnia, unspecified type        Plan:       Essential hypertension    Cervical spondylosis without myelopathy    Lumbar radiculopathy    Anxiety    Insomnia, unspecified type    Other orders  -     hydrocodone-acetaminophen 5-325mg (NORCO) 5-325 mg per tablet; Take 1 tablet by mouth 2 (two) times daily as needed for Pain.  Dispense: 60 tablet; Refill: 0  -     hydrocodone-acetaminophen 5-325mg (NORCO) 5-325 mg per tablet; Take 1 tablet by mouth 2 (two) times daily as needed for Pain.  Dispense: 60 tablet; Refill: 0  -     hydrocodone-acetaminophen 5-325mg (NORCO) 5-325 mg per tablet; Take 1 tablet by mouth 2 (two) times daily as needed for Pain.  Dispense: 60 tablet; Refill: 0  -     ALPRAZolam (XANAX) 0.25 MG tablet; TAKE 1 TABLET(0.25 MG) BY MOUTH TWICE DAILY AS NEEDED FOR ANXIETY  Dispense: 60 tablet; Refill: 5  -     zolpidem (AMBIEN) 10 mg Tab; TAKE 1 TABLET BY MOUTH EVERY NIGHT AT BEDTIME  Dispense: 30 tablet; Refill: 5      Plan:  Cont current meds

## 2018-01-25 ENCOUNTER — OFFICE VISIT (OUTPATIENT)
Dept: PODIATRY | Facility: CLINIC | Age: 73
End: 2018-01-25
Payer: MEDICARE

## 2018-01-25 VITALS — BODY MASS INDEX: 23 KG/M2 | WEIGHT: 125 LBS | HEIGHT: 62 IN

## 2018-01-25 DIAGNOSIS — M54.17 LUMBOSACRAL RADICULOPATHY: Primary | ICD-10-CM

## 2018-01-25 DIAGNOSIS — I83.893 VARICOSE VEINS OF BOTH LEGS WITH EDEMA: ICD-10-CM

## 2018-01-25 PROCEDURE — 99999 PR PBB SHADOW E&M-EST. PATIENT-LVL III: CPT | Mod: PBBFAC,,, | Performed by: PODIATRIST

## 2018-01-25 PROCEDURE — 99214 OFFICE O/P EST MOD 30 MIN: CPT | Mod: S$GLB,,, | Performed by: PODIATRIST

## 2018-01-25 RX ORDER — GABAPENTIN 300 MG/1
300 CAPSULE ORAL NIGHTLY
Qty: 90 CAPSULE | Refills: 3 | Status: SHIPPED | OUTPATIENT
Start: 2018-01-25 | End: 2019-01-20 | Stop reason: SDUPTHER

## 2018-01-29 DIAGNOSIS — J31.0 CHRONIC RHINITIS: ICD-10-CM

## 2018-01-29 DIAGNOSIS — J30.9 ALLERGIC RHINITIS: ICD-10-CM

## 2018-01-29 NOTE — PROGRESS NOTES
Subjective:      Patient ID: Luis Miguel Murcia is a 72 y.o. female.    Chief Complaint: Foot Pain (pain and swelling of right foot) and Other Misc (PCP Dr. Jeong 01/23/2018)    Luis Miguel is a 72 y.o. female who presents to the podiatry clinic  with complaint of  right foot pain. She relates that the pain is mostly in the evening and is sharp, sometimes burning pain. Weight bearing or non weight bearing. She has a history of back problems and is being managed by pain specialist occasionally gets injections but it has been a while. She relates the pain keeps her up at night sometimes. She also notices her foot and ankle swells the more she is on it throughout the day. No other pedal complaints at this time.         Review of Systems   Constitution: Negative for chills and fever.   Cardiovascular: Positive for leg swelling. Negative for claudication.   Respiratory: Negative for shortness of breath.    Skin: Negative for itching, nail changes and rash.   Musculoskeletal: Positive for arthritis and joint swelling. Negative for muscle cramps, muscle weakness and myalgias.   Gastrointestinal: Negative for nausea and vomiting.   Neurological: Positive for paresthesias. Negative for focal weakness, loss of balance and numbness.           Objective:      Physical Exam   Constitutional: She is oriented to person, place, and time. She appears well-developed and well-nourished. No distress.   Cardiovascular:   Pulses:       Dorsalis pedis pulses are 2+ on the right side, and 2+ on the left side.        Posterior tibial pulses are 2+ on the right side, and 2+ on the left side.   < 3 sec capillary refill time to toes 1-5 bilateral. Toes and feet are warm to touch proximally with normal distal cooling b/l. There is diminished hair growth on the feet and toes b/l. There is edema b/l R>L with varicosities present b/l.      Musculoskeletal:   Equinus noted b/l ankles with < 10 deg DF noted. MMT 5/5 in DF/PF/Inv/Ev resistance  with no reproduction of pain in any direction. Passive range of motion of ankle and pedal joints is painless b/l.    No focal points of tenderness bilateral with palpation or ROM exercises of joints.      Feet:   Right Foot:   Protective Sensation: 10 sites tested. 8 sites sensed.   Left Foot:   Protective Sensation: 10 sites tested. 9 sites sensed.   Neurological: She is alert and oriented to person, place, and time. She has normal strength. She displays no atrophy and no tremor. No sensory deficit. She exhibits normal muscle tone.   Negative tinel sign bilateral.   Skin: Skin is warm, dry and intact. No abrasion, no bruising, no burn, no ecchymosis, no laceration, no lesion, no petechiae and no rash noted. She is not diaphoretic. No cyanosis or erythema. No pallor. Nails show no clubbing.   Skin is thin and atrophic   Psychiatric: She has a normal mood and affect. Her behavior is normal.             Assessment:       Encounter Diagnoses   Name Primary?    Lumbosacral radiculopathy Yes    Varicose veins of both legs with edema          Plan:       Luis Miguel was seen today for foot pain and other misc.    Diagnoses and all orders for this visit:    Lumbosacral radiculopathy    Varicose veins of both legs with edema    Other orders  -     gabapentin (NEURONTIN) 300 MG capsule; Take 1 capsule (300 mg total) by mouth every evening.      I counseled the patient on her conditions, their implications and medical management.    Discussed that the pain at night keeping her up is likely nerve pain related to her back. Prescribed gabapentin 300 mg for nerve pain and to help her sleep. She Will take at night before bed.    Discussed elevation of legs, OTC 10-20 mmHg compression stockings during the day for edema.    She will return in 1-2 month for follow up pain, consider going up on Gabapentin if needed.    Zay Ramos DPM

## 2018-01-30 RX ORDER — LORATADINE 10 MG/1
TABLET ORAL
Qty: 30 TABLET | Refills: 0 | OUTPATIENT
Start: 2018-01-30

## 2018-01-30 RX ORDER — AZELASTINE 1 MG/ML
SPRAY, METERED NASAL
Qty: 30 ML | Refills: 11 | Status: SHIPPED | OUTPATIENT
Start: 2018-01-30 | End: 2019-02-15 | Stop reason: SDUPTHER

## 2018-02-19 RX ORDER — ZOLPIDEM TARTRATE 10 MG/1
TABLET ORAL
Qty: 30 TABLET | Refills: 5 | Status: SHIPPED | OUTPATIENT
Start: 2018-02-19 | End: 2018-07-20 | Stop reason: SDUPTHER

## 2018-02-26 ENCOUNTER — TELEPHONE (OUTPATIENT)
Dept: OPHTHALMOLOGY | Facility: CLINIC | Age: 73
End: 2018-02-26

## 2018-02-26 NOTE — TELEPHONE ENCOUNTER
----- Message from Diane Connor sent at 2/23/2018  1:28 PM CST -----  Contact: self   Patient want to speak with a nurse regarding scheduling appointment for Retina was unable to schedule appointment please call back at [phone] (home)

## 2018-03-07 ENCOUNTER — OFFICE VISIT (OUTPATIENT)
Dept: PAIN MEDICINE | Facility: CLINIC | Age: 73
End: 2018-03-07
Payer: MEDICARE

## 2018-03-07 VITALS
DIASTOLIC BLOOD PRESSURE: 74 MMHG | SYSTOLIC BLOOD PRESSURE: 120 MMHG | BODY MASS INDEX: 22.96 KG/M2 | TEMPERATURE: 98 F | WEIGHT: 125.56 LBS | RESPIRATION RATE: 18 BRPM | HEART RATE: 76 BPM

## 2018-03-07 DIAGNOSIS — M51.36 DDD (DEGENERATIVE DISC DISEASE), LUMBAR: ICD-10-CM

## 2018-03-07 DIAGNOSIS — M54.16 LUMBAR RADICULOPATHY: Primary | ICD-10-CM

## 2018-03-07 PROCEDURE — 3074F SYST BP LT 130 MM HG: CPT | Mod: S$GLB,,, | Performed by: PHYSICIAN ASSISTANT

## 2018-03-07 PROCEDURE — 99999 PR PBB SHADOW E&M-EST. PATIENT-LVL V: CPT | Mod: PBBFAC,,, | Performed by: PHYSICIAN ASSISTANT

## 2018-03-07 PROCEDURE — 3078F DIAST BP <80 MM HG: CPT | Mod: S$GLB,,, | Performed by: PHYSICIAN ASSISTANT

## 2018-03-07 PROCEDURE — 99213 OFFICE O/P EST LOW 20 MIN: CPT | Mod: S$GLB,,, | Performed by: PHYSICIAN ASSISTANT

## 2018-03-07 RX ORDER — ALPRAZOLAM 0.5 MG/1
1 TABLET, ORALLY DISINTEGRATING ORAL ONCE AS NEEDED
Status: CANCELLED | OUTPATIENT
Start: 2018-03-27 | End: 2018-03-27

## 2018-03-07 NOTE — PROGRESS NOTES
"CC: back pain    HPI: The patient is a 72-year-old woman with a history of hypertension, cervical lumbar disc degeneration, osteopenia presents in referral from Dr. Jeong for chronic neck and low back pain.  She returns in follow-up today with right leg pain.  She was seen podiatry for right foot pain but she is actually having pain all the way down her leg through her foot and it was determined that this is a lumbar radicular pain like she has had in the past.  The pain starts in the right buttock and radiates into the right posterior thigh, lateral calf around the right ankle and throughout the entire right foot.  She describes is deep, worse at night and not significantly improved with anything.  She denies weakness, numbness, bladder or bowel incontinence.    Pain intervention history: She has not had any epidural steroid injections, has been taking baclofen and rarely takes hydrocodone with mild relief. Radiofrequency ablation of the Bilateral L3,4,5 medial branch nerves on 12/18/13 with no relief of pain, however she reports on 8/19/14 that she had significant relief with the procedure.  She is status post bilateral SI joint injections on 3/1/13 with excellent relief.  She reports having "steroid psychosis" when she received IV steroids several years ago but did not have any adverse effects with SI joint injections in 2013.  She is status post C7-T1 cervical interlaminar epidural steroid injection on 3/2/15 with 20% relief.  She is status post left C3, 4, 5 and 6 medial branch radiofrequency ablation on 5/27/15 with 25% relief.  She is status post L5/S1 interlaminar epidural steroid injection on 7/8/15 with 100% relief of her right buttock and right leg pain, 0% relief of her back pain.  She is status post bilateral L3, 4, 5 medial branch radiofrequency ablation on 8/18/15 with 40% relief of her bilateral low back pain.  She is status post right L5 transforaminal epidural steroid injection on 5/2/16 with 60% " relief.     ROS:She reports headaches, back pain and difficulty sleeping.  Balance of review of systems is negative.    Medical, surgical, family and social history reviewed elsewhere in record.    Medications/Allergies: See med card    Vitals:    03/07/18 1503   BP: 120/74   Pulse: 76   Resp: 18   Temp: 97.7 °F (36.5 °C)   TempSrc: Oral   Weight: 56.9 kg (125 lb 8.8 oz)   PainSc:   6   PainLoc: Foot         Physical exam:  Gen: A and O x3, pleasant, well-groomed  Skin: No rashes or obvious lesions  HEENT: PERRLA  CVS: Regular rate and rhythm, normal S1 and S2, no murmurs.  Resp: Clear to auscultation bilaterally, no wheezes or rales.  Musculoskeletal: Able to heel walk, toe walk. No antalgic gait.     Neuro:  Upper extremities: 5/5 strength bilaterally   Lower extremities: 5/5 strength bilaterally  Reflexes: Brachioradialis 2+, Bicep 2+, Tricep 2+. Patellar 2+, Achilles 2+.  Sensory: Intact and symmetrical to light touch and pinprick in C2-T1 dermatomes bilaterally. Intact and symmetrical to light touch and pinprick in L2-S1 dermatomes bilaterally.    Lumbar spine:  Lumbar spine: ROM is full with flexion and extension with increased right buttock pain during flexion.  Roberto's test causes right low back pain.  Supine straight leg raise is positive for right buttock and posterior thigh pain.  Internal and external rotation of the hip causes right low back pain.  Myofascial exam: Mild tenderness to palpation to the right upper gluteal region.      Imaging:  C-spine MRI 9/30/10: There is anterolisthesis of C3 on 4 and 4 on 5 mile, retrolisthesis of C5 and 6.  At C3/4 there is a mild bulge with foraminal narrowing bilaterally mild.  At C5-C6 there is a disc bulge with right upper protrusion of the disc but severe foraminal narrowing on the right and moderate to severe on the left.  There is disc bulging at C6/7 with a bulge more to the right.    MRI L-spine 5/2011: At L3/4 there is mild anterolisthesis, Modic endplate  changes.  There is bilateral severe facet arthropathy and a broad-based disc bulge more to the left with compression of the descending L4 nerve root and slight effacement of the L3 nerve root at that level.  At L4-5 there is bilateral facet arthropathy.  At L5/S1 there is mild disc bulge and arthropathy. There is also Ligamentous hypertrophy at L4/5 posteriorly causing some narrowing of the central canal.    MRI cervical spine 1/23/15  C2/C3: Mild uncovertebral and facet induced neural foraminal narrowing is noted bilaterally.  C3/C4: Annular disk bulge is evident and there is moderate left greater than right uncovertebral and facet and is neural foraminal narrowing.  C4/C5: Uncovertebral induced neural foraminal narrowing is noted bilaterally, left greater than right a moderate degree. There is mild annular disk bulge with only mild distortion of the central canal.  C5/C6: Annular disk bulge and osteophyte formation combined with posterior canal ligamentous hypertrophy to produce moderate canal stenosis. The canal is narrowed to 9 mm the cord is contacted. There is prominent right greater than left uncovertebral and facet induced neural foraminal narrowing.  C6/C7: Disk bulge and osteophyte formation combined with posterior canal ligamentous hypertrophy to produce mild/moderate canal stenosis. The cord is contacted. Moderate right greater than left uncovertebral and facet induced neural foraminal narrowingis noted.  C7/T1: Degenerative facet changes are present I do not see evidence of significant canal or foraminal stenosis.     MRI lumbar spine 1/23/15  T11/T12: Disk bulge is evident with mild central canal stenosis.  T12/L1: There is no evidence of distribution, canal or foraminal stenosis.  L1/L2: Minimal annular disk bulging is evidence of mild degenerative facet changes are noted without evidence of significant canal or foraminal stenosis.  L2/L3: Mild annular disk bulging is evident and this combines with  degenerative facet changes to produce minimal canal and foraminal distortion.  L3/L4: Degenerative facet changes are noted and there is a left paracentral and posterior lateral disk extrusion with moderate narrowing of the inferior left foramen. The central canal is intact. There is also suggestion of a small right posterior lateral disk protrusion.   L4/L5: There is annular disk bulging this combines with degenerative facet and ligamentous hypertrophic change to produce mild canal and foraminal narrowing.  L5/S1: A small central disk protrusion is present causing mild distortion of the central canal. Degenerative facet changes are noted in the right foramen is moderately narrowed.       Assessment:  The patient is a 72-year-old woman with a history of hypertension, cervical lumbar disc degeneration, osteopenia presents in referral from Dr. Jeong for chronic neck and low back pain.     1. Lumbar radiculopathy  Vital signs    Activity as tolerated    Verify informed consent    Notify physician     Notify physician     Notify physician (specify)    Diet NPO    Case Request Operating Room: DEQUAN-TRANSFORAMINAL L5/S1    Place in Outpatient    alprazolam ODT dissolvable tablet 1 mg   2. DDD (degenerative disc disease), lumbar           Plan:  1.  I will set the patient up to repeat a right L5/S1 transforaminal epidural steroid injection.  She has done well with this in the past.  2.  Follow-up in 4 weeks post procedure or sooner as needed.

## 2018-03-12 ENCOUNTER — TELEPHONE (OUTPATIENT)
Dept: OPHTHALMOLOGY | Facility: CLINIC | Age: 73
End: 2018-03-12

## 2018-03-12 ENCOUNTER — INITIAL CONSULT (OUTPATIENT)
Dept: OPHTHALMOLOGY | Facility: CLINIC | Age: 73
End: 2018-03-12
Payer: MEDICARE

## 2018-03-12 DIAGNOSIS — H35.89 RETINAL MACULAR ATROPHY: Primary | ICD-10-CM

## 2018-03-12 DIAGNOSIS — H33.329 RETINAL HOLE, UNSPECIFIED LATERALITY: ICD-10-CM

## 2018-03-12 DIAGNOSIS — H25.12 NS (NUCLEAR SCLEROSIS), LEFT: ICD-10-CM

## 2018-03-12 PROCEDURE — 92004 COMPRE OPH EXAM NEW PT 1/>: CPT | Mod: S$GLB,,, | Performed by: OPHTHALMOLOGY

## 2018-03-12 PROCEDURE — 92225 PR SPECIAL EYE EXAM, INITIAL: CPT | Mod: 50,S$GLB,, | Performed by: OPHTHALMOLOGY

## 2018-03-12 PROCEDURE — 92134 CPTRZ OPH DX IMG PST SGM RTA: CPT | Mod: S$GLB,,, | Performed by: OPHTHALMOLOGY

## 2018-03-12 PROCEDURE — 99999 PR PBB SHADOW E&M-EST. PATIENT-LVL II: CPT | Mod: PBBFAC,,, | Performed by: OPHTHALMOLOGY

## 2018-03-12 NOTE — LETTER
March 12, 2018      Wanda Brody MD  [address] [place] [address]   [address]  [facility] [address]           [facility]  [address] [facility] [address] [place] [address]  Phone: [phone]  Fax: [phone]          Patient: Luis Miguel Murcia   MR Number: [id number]   YOB: 1945   Date of Visit: 3/12/2018       Dear Dr. Wanda Brody:    Thank you for referring Luis Miguel Murcia to me for evaluation. Attached you will find relevant portions of my assessment and plan of care.    If you have questions, please do not hesitate to call me. I look forward to following Luis Miguel Murcia along with you.    Sincerely,    ELIZABETH Benitez MD    Enclosure  CC:  No Recipients    If you would like to receive this communication electronically, please contact [email or web address] or [phone] to request more information on [facility] Link access.    For providers and/or their staff who would like to refer a patient to [facility], please contact us through our one-stop-shop provider referral line, [facility], at [phone].    If you feel you have received this communication in error or would no longer like to receive these types of communications, please e-mail [email or web address]

## 2018-03-12 NOTE — TELEPHONE ENCOUNTER
----- Message from Ania Graham sent at 3/12/2018  3:07 PM CDT -----  Please call pt for Yag. Thanks

## 2018-03-12 NOTE — PROGRESS NOTES
HPI     Eye Problem    Additional comments: h/o mac hole           Comments   Patient has h/o sx for retinal hole/tear 2010. Vision OD is not good since   this happened. She would like to know if anything can be done       Last edited by Silva Ren on 3/12/2018  2:08 PM. (History)      OCT - temporal macular atrophy OD  OS - No ME      A/P    1. Macular atrophy OD  S/p PPV 2009 at [facility]  Some fibrosis and atrophy  Monitor    2. PCIOL OD  With PCO  Ok for Yag with Dr. MATUTE - prior Va was 20/30 in 2013    3. NS OS  Ok for CE when ready      1 year OCT

## 2018-03-19 ENCOUNTER — TELEPHONE (OUTPATIENT)
Dept: PAIN MEDICINE | Facility: CLINIC | Age: 73
End: 2018-03-19

## 2018-03-19 NOTE — TELEPHONE ENCOUNTER
----- Message from Giana Dawn sent at 3/19/2018 12:36 PM CDT -----  Contact: self  Patient needs to speak to the nurse about her procedure she is having on 03/27    Please call back [phone]

## 2018-03-19 NOTE — TELEPHONE ENCOUNTER
Patient wanted to know when her procedure was scheduled for and she is going to stop her meds tomorrow.

## 2018-03-26 DIAGNOSIS — M51.36 DDD (DEGENERATIVE DISC DISEASE), LUMBAR: Primary | ICD-10-CM

## 2018-03-27 ENCOUNTER — HOSPITAL ENCOUNTER (OUTPATIENT)
Facility: HOSPITAL | Age: 73
Discharge: HOME | End: 2018-03-27
Attending: ANESTHESIOLOGY | Admitting: ANESTHESIOLOGY
Payer: MEDICARE

## 2018-03-27 ENCOUNTER — SURGERY (OUTPATIENT)
Age: 73
End: 2018-03-27

## 2018-03-27 ENCOUNTER — HOSPITAL ENCOUNTER (OUTPATIENT)
Dept: RADIOLOGY | Facility: HOSPITAL | Age: 73
Discharge: HOME | End: 2018-03-27
Attending: ANESTHESIOLOGY | Admitting: ANESTHESIOLOGY
Payer: MEDICARE

## 2018-03-27 VITALS
HEART RATE: 55 BPM | WEIGHT: 121 LBS | BODY MASS INDEX: 22.26 KG/M2 | RESPIRATION RATE: 16 BRPM | OXYGEN SATURATION: 100 % | DIASTOLIC BLOOD PRESSURE: 71 MMHG | HEIGHT: 62 IN | SYSTOLIC BLOOD PRESSURE: 138 MMHG | TEMPERATURE: 98 F

## 2018-03-27 DIAGNOSIS — M51.36 DDD (DEGENERATIVE DISC DISEASE), LUMBAR: ICD-10-CM

## 2018-03-27 DIAGNOSIS — M54.16 LUMBAR RADICULOPATHY: Primary | ICD-10-CM

## 2018-03-27 PROCEDURE — 63600175 PHARM REV CODE 636 W HCPCS: Mod: PO | Performed by: ANESTHESIOLOGY

## 2018-03-27 PROCEDURE — 64483 NJX AA&/STRD TFRM EPI L/S 1: CPT | Mod: RT,,, | Performed by: ANESTHESIOLOGY

## 2018-03-27 PROCEDURE — 25500020 PHARM REV CODE 255: Mod: PO | Performed by: ANESTHESIOLOGY

## 2018-03-27 PROCEDURE — 64483 NJX AA&/STRD TFRM EPI L/S 1: CPT | Mod: PO | Performed by: ANESTHESIOLOGY

## 2018-03-27 PROCEDURE — 25000003 PHARM REV CODE 250: Mod: PO | Performed by: ANESTHESIOLOGY

## 2018-03-27 PROCEDURE — 76000 FLUOROSCOPY <1 HR PHYS/QHP: CPT | Mod: TC,PO

## 2018-03-27 RX ORDER — BUPIVACAINE HYDROCHLORIDE 2.5 MG/ML
INJECTION, SOLUTION EPIDURAL; INFILTRATION; INTRACAUDAL
Status: DISCONTINUED | OUTPATIENT
Start: 2018-03-27 | End: 2018-03-27 | Stop reason: HOSPADM

## 2018-03-27 RX ORDER — LIDOCAINE HYDROCHLORIDE 10 MG/ML
INJECTION, SOLUTION EPIDURAL; INFILTRATION; INTRACAUDAL; PERINEURAL
Status: DISCONTINUED | OUTPATIENT
Start: 2018-03-27 | End: 2018-03-27 | Stop reason: HOSPADM

## 2018-03-27 RX ORDER — ALPRAZOLAM 0.5 MG/1
1 TABLET, ORALLY DISINTEGRATING ORAL ONCE AS NEEDED
Status: COMPLETED | OUTPATIENT
Start: 2018-03-27 | End: 2018-03-27

## 2018-03-27 RX ORDER — METHYLPREDNISOLONE ACETATE 40 MG/ML
INJECTION, SUSPENSION INTRA-ARTICULAR; INTRALESIONAL; INTRAMUSCULAR; SOFT TISSUE
Status: DISCONTINUED | OUTPATIENT
Start: 2018-03-27 | End: 2018-03-27 | Stop reason: HOSPADM

## 2018-03-27 RX ADMIN — IOHEXOL 3 ML: 300 INJECTION, SOLUTION INTRAVENOUS at 02:03

## 2018-03-27 RX ADMIN — BUPIVACAINE HYDROCHLORIDE 3 ML: 2.5 INJECTION, SOLUTION EPIDURAL; INFILTRATION; INTRACAUDAL; PERINEURAL at 02:03

## 2018-03-27 RX ADMIN — METHYLPREDNISOLONE ACETATE 40 MG: 40 INJECTION, SUSPENSION INTRA-ARTICULAR; INTRALESIONAL; INTRAMUSCULAR; SOFT TISSUE at 02:03

## 2018-03-27 RX ADMIN — ALPRAZOLAM 1 MG: 0.5 TABLET, ORALLY DISINTEGRATING ORAL at 01:03

## 2018-03-27 RX ADMIN — LIDOCAINE HYDROCHLORIDE 10 ML: 10 INJECTION, SOLUTION EPIDURAL; INFILTRATION; INTRACAUDAL; PERINEURAL at 02:03

## 2018-03-27 NOTE — DISCHARGE SUMMARY
[facility]  Discharge Note  Short Stay    Admit Date: 3/27/2018    Discharge Date: 3/27/2018    Attending Physician: David Maza MD     Discharge Provider: David Maza    Diagnoses:  Active Hospital Problems    Diagnosis  POA    *Lumbar radiculopathy [M54.16]  Yes      Resolved Hospital Problems    Diagnosis Date Resolved POA   No resolved problems to display.       Discharged Condition: good    Final Diagnoses: Lumbar radiculopathy [M54.16]    Disposition: Home or Self Care    Hospital Course: no complications, uneventful    Outcome of Hospitalization, Treatment, Procedure, or Surgery:  Patient was admitted for outpatient procedure. The patient underwent procedure without complications and are discharged home    Follow up/Patient Instructions:  Follow up as scheduled/Patient has received instructions and follow up date    Medications:  Continue previous medications      Discharge Procedure Orders  Call MD for:  temperature >100.4     Call MD for:  severe uncontrolled pain     Call MD for:  redness, tenderness, or signs of infection (pain, swelling, redness, odor or green/yellow discharge around incision site)     Call MD for:  severe persistent headache     No dressing needed           Discharge Procedure Orders (must include Diet, Follow-up, Activity):    Discharge Procedure Orders (must include Diet, Follow-up, Activity)  Call MD for:  temperature >100.4     Call MD for:  severe uncontrolled pain     Call MD for:  redness, tenderness, or signs of infection (pain, swelling, redness, odor or green/yellow discharge around incision site)     Call MD for:  severe persistent headache     No dressing needed

## 2018-03-27 NOTE — OP NOTE
PROCEDURE DATE: 3/27/2018    PROCEDURE: Right L5/S1 transforaminal epidural steroid injection under fluoroscopy    DIAGNOSIS: Lumbar  Radiculopathy    Post op diagnosis: Same    PHYSICIAN: David Maza MD    MEDICATIONS INJECTED:  Methylprednisolone 40mg (0.5ml) and 1.5ml 0.25% bupivicaine at each nerve root.     LOCAL ANESTHETIC INJECTED:  Lidocaine 1%. 4 ml per site.    SEDATION MEDICATIONS: none    ESTIMATED BLOOD LOSS:  none    COMPLICATIONS:  none    TECHNIQUE:   A time-out was taken to identify patient and procedure side prior to starting the procedure. The patient was placed in a prone position, prepped and draped in the usual sterile fashion using ChloraPrep and sterile towels.  The area to be injected was determined under fluoroscopic guidance in AP and oblique view.  Local anesthetic was given by raising a wheal and going down to the hub of a 25-gauge 1.5 inch needle.  In oblique view, a 3.5 inch 22-gauge bent-tip spinal needle was introduced towards 6 oclock position of the pedicle of each above named nerve root level.  The needle was walked medially then hinged into the neural foramen and position was confirmed in AP and lateral views.  Omnipaque contrast dye was injected to confirm appropriate placement and that there was no vascular uptake.  After negative aspiration for blood or CSF, the medication was then injected. This was performed at the Right L5/S1 level(s). The patient tolerated the procedure well.    The patient was monitored after the procedure.  Patient was given post procedure and discharge instructions to follow at home. The patient was discharged in a stable condition.

## 2018-04-10 ENCOUNTER — OFFICE VISIT (OUTPATIENT)
Dept: DERMATOLOGY | Facility: CLINIC | Age: 73
End: 2018-04-10
Payer: MEDICARE

## 2018-04-10 VITALS — WEIGHT: 121 LBS | BODY MASS INDEX: 22.26 KG/M2 | HEIGHT: 62 IN

## 2018-04-10 DIAGNOSIS — L81.4 LENTIGINES: Primary | ICD-10-CM

## 2018-04-10 DIAGNOSIS — L82.1 SEBORRHEIC KERATOSES: ICD-10-CM

## 2018-04-10 PROCEDURE — 99999 PR PBB SHADOW E&M-EST. PATIENT-LVL II: CPT | Mod: PBBFAC,,, | Performed by: DERMATOLOGY

## 2018-04-10 PROCEDURE — 99213 OFFICE O/P EST LOW 20 MIN: CPT | Mod: S$GLB,,, | Performed by: DERMATOLOGY

## 2018-04-10 NOTE — PROGRESS NOTES
Subjective:       Patient ID:  Luis Miguel Murcia is a 73 y.o. female who presents for   Chief Complaint   Patient presents with    Skin Check     Pt here for f/u,  Last seen 10-3-2017- eczema using kenalog 0.1% cream when flared- much improved  C/o lesion on left inner thigh for 3 months- gets irritated by her jeans rubbing on it- used Kenalog cream - did not help  Brown spots on legs for several months- rough- not treated     Recent Pathology   FINAL PATHOLOGIC DIAGNOSIS  Skin, left neck, shave biopsy:  - SEBORRHEIC KERATOSIS.  MICROSCOPIC DESCRIPTION: Sections show an abrupt proliferation of benign basaloid keratinocytes exhibiting  acanthosis, hyperkeratosis, papillomatosis and horn pseudocyst formation.  Diagnosed by: Stormy Solano M.D.  (Electronically Signed: 2016-07-21 14:36:01)  Gross Description  AP/-[id number]  Received in formalin, labeled left neck, is a 1.3 cm shave biopsy of nonpigmented skin. The skin surface is  lobulated and firm. Entirely submitted.  Davida Lai  Page 1     Wears sunscreen on her face daily- spf 30  No phx of skin ca  No fhx of skin ca                                 Review of Systems   Skin: Positive for activity-related sunscreen use. Negative for rash and daily sunscreen use.        Objective:    Physical Exam   Constitutional: She appears well-developed and well-nourished. No distress.   Neurological: She is alert and oriented to person, place, and time. She is not disoriented.   Psychiatric: She has a normal mood and affect.   Skin:   Areas Examined (abnormalities noted in diagram):   Head / Face Inspection Performed  Neck Inspection Performed  Chest / Axilla Inspection Performed  Back Inspection Performed  RUE Inspected  LUE Inspection Performed  RLE Inspected  LLE Inspection Performed              Diagram Legend     Erythematous scaling macule/papule c/w actinic keratosis       Vascular papule c/w angioma      Pigmented verrucoid papule/plaque c/w seborrheic  keratosis      Yellow umbilicated papule c/w sebaceous hyperplasia      Irregularly shaped tan macule c/w lentigo     1-2 mm smooth white papules consistent with Milia      Movable subcutaneous cyst with punctum c/w epidermal inclusion cyst      Subcutaneous movable cyst c/w pilar cyst      Firm pink to brown papule c/w dermatofibroma      Pedunculated fleshy papule(s) c/w skin tag(s)      Evenly pigmented macule c/w junctional nevus     Mildly variegated pigmented, slightly irregular-bordered macule c/w mildly atypical nevus      Flesh colored to evenly pigmented papule c/w intradermal nevus       Pink pearly papule/plaque c/w basal cell carcinoma      Erythematous hyperkeratotic cursted plaque c/w SCC      Surgical scar with no sign of skin cancer recurrence      Open and closed comedones      Inflammatory papules and pustules      Verrucoid papule consistent consistent with wart     Erythematous eczematous patches and plaques     Dystrophic onycholytic nail with subungual debris c/w onychomycosis     Umbilicated papule    Erythematous-base heme-crusted tan verrucoid plaque consistent with inflamed seborrheic keratosis     Erythematous Silvery Scaling Plaque c/w Psoriasis     See annotation      Assessment / Plan:        Lentigines, chest/legs  This is a benign hyperpigmented sun induced lesion. Daily sun protection will reduce the number of new lesions. Treatment of these benign lesions are considered cosmetic.      Seborrheic keratoses, chest/thigh  These are benign inherited growths without a malignant potential. Reassurance given to patient. No treatment is necessary.   Avoid long term use TAC on lesions. Ok to try desitin for barrier cream              Follow-up in about 1 year (around 4/10/2019).

## 2018-04-23 ENCOUNTER — OFFICE VISIT (OUTPATIENT)
Dept: FAMILY MEDICINE | Facility: CLINIC | Age: 73
End: 2018-04-23
Payer: MEDICARE

## 2018-04-23 VITALS
BODY MASS INDEX: 23.13 KG/M2 | DIASTOLIC BLOOD PRESSURE: 62 MMHG | SYSTOLIC BLOOD PRESSURE: 98 MMHG | HEIGHT: 62 IN | OXYGEN SATURATION: 97 % | TEMPERATURE: 98 F | HEART RATE: 55 BPM | WEIGHT: 125.69 LBS

## 2018-04-23 DIAGNOSIS — I10 ESSENTIAL HYPERTENSION: ICD-10-CM

## 2018-04-23 DIAGNOSIS — M54.16 LUMBAR RADICULOPATHY: ICD-10-CM

## 2018-04-23 DIAGNOSIS — J32.9 SINUSITIS, UNSPECIFIED CHRONICITY, UNSPECIFIED LOCATION: Primary | ICD-10-CM

## 2018-04-23 DIAGNOSIS — F41.9 ANXIETY: ICD-10-CM

## 2018-04-23 DIAGNOSIS — M47.812 CERVICAL SPONDYLOSIS WITHOUT MYELOPATHY: ICD-10-CM

## 2018-04-23 DIAGNOSIS — G47.00 INSOMNIA, UNSPECIFIED TYPE: ICD-10-CM

## 2018-04-23 PROCEDURE — 3074F SYST BP LT 130 MM HG: CPT | Mod: CPTII,S$GLB,, | Performed by: FAMILY MEDICINE

## 2018-04-23 PROCEDURE — 3078F DIAST BP <80 MM HG: CPT | Mod: CPTII,S$GLB,, | Performed by: FAMILY MEDICINE

## 2018-04-23 PROCEDURE — 99999 PR PBB SHADOW E&M-EST. PATIENT-LVL III: CPT | Mod: PBBFAC,,, | Performed by: FAMILY MEDICINE

## 2018-04-23 PROCEDURE — 99214 OFFICE O/P EST MOD 30 MIN: CPT | Mod: S$GLB,,, | Performed by: FAMILY MEDICINE

## 2018-04-23 RX ORDER — FLUTICASONE PROPIONATE 50 MCG
2 SPRAY, SUSPENSION (ML) NASAL DAILY
Qty: 16 G | Refills: 11 | Status: SHIPPED | OUTPATIENT
Start: 2018-04-23 | End: 2019-07-06 | Stop reason: SDUPTHER

## 2018-04-23 RX ORDER — HYDROCODONE BITARTRATE AND ACETAMINOPHEN 5; 325 MG/1; MG/1
1 TABLET ORAL 2 TIMES DAILY PRN
Qty: 60 TABLET | Refills: 0 | Status: SHIPPED | OUTPATIENT
Start: 2018-05-28 | End: 2018-06-27

## 2018-04-23 RX ORDER — PREDNISONE 10 MG/1
TABLET ORAL
Qty: 20 TABLET | Refills: 0 | Status: SHIPPED | OUTPATIENT
Start: 2018-04-23 | End: 2018-06-29

## 2018-04-23 RX ORDER — HYDROCODONE BITARTRATE AND ACETAMINOPHEN 5; 325 MG/1; MG/1
1 TABLET ORAL 2 TIMES DAILY PRN
Qty: 60 TABLET | Refills: 0 | Status: SHIPPED | OUTPATIENT
Start: 2018-06-27 | End: 2018-07-20 | Stop reason: SDUPTHER

## 2018-04-23 RX ORDER — HYDROCODONE BITARTRATE AND ACETAMINOPHEN 5; 325 MG/1; MG/1
1 TABLET ORAL 2 TIMES DAILY PRN
Qty: 60 TABLET | Refills: 0 | Status: SHIPPED | OUTPATIENT
Start: 2018-04-28 | End: 2018-05-28

## 2018-04-23 RX ORDER — DOXYCYCLINE 100 MG/1
100 CAPSULE ORAL 2 TIMES DAILY
Qty: 20 CAPSULE | Refills: 0 | Status: SHIPPED | OUTPATIENT
Start: 2018-04-23 | End: 2018-06-29

## 2018-04-23 NOTE — PROGRESS NOTES
Subjective:       Patient ID: Luis Miguel Murcia is a 73 y.o. female.    Chief Complaint: Sinus Problem    HPI     Reports sinus congestion x 2 months.  Using claritin, astelin and netti pot.      htn is stable. No c/o cp or sob at rest or exertion. No headaches.      C/o chronic right lumbar radicular pain > 1 year. Sees pain management. Ps: 2-3/10 while on norco. Reports mild relief from poppy L5-S1 in 3/2018.       Also, has chronic neck pain > 1 year. Sees pain management. Ps: 2-3/10 while on norco       Imaging:  C-spine MRI 9/30/10: There is anterolisthesis of C3 on 4 and 4 on 5 mile, retrolisthesis of C5 and 6. At C3/4 there is a mild bulge with foraminal narrowing bilaterally mild. At C5-C6 there is a disc bulge with right upper protrusion of the disc but severe foraminal narrowing on the right and moderate to severe on the left. There is disc bulging at C6/7 with a bulge more to the right.     MRI L-spine 5/2011: At L3/4 there is mild anterolisthesis, Modic endplate changes. There is bilateral severe facet arthropathy and a broad-based disc bulge more to the left with compression of the descending L4 nerve root and slight effacement of the L3 nerve root at that level. At L4-5 there is bilateral facet arthropathy. At L5/S1 there is mild disc bulge and arthropathy. There is also Ligamentous hypertrophy at L4/5 posteriorly causing some narrowing of the central canal.     Anxiety stable while taking xanax.     Copd stable. No worsening cough or wheeze.     Insomnia stable while on ambien.        Review of Systems      Review of Systems   Constitutional: Negative for fever and chills.   HENT: Negative for hearing loss and neck stiffness.    Eyes: Negative for redness and itching.   Respiratory: Negative for choking.    Cardiovascular: Negative for chest pain and leg swelling.  Abdomen: Negative for abdominal pain and blood in stool.   Genitourinary: Negative for dysuria and flank pain.   Musculoskeletal:  Negative for back pain and gait problem.   Neurological: Negative for light-headedness and headaches.   Hematological: Negative for adenopathy.   Psychiatric/Behavioral: Negative for behavioral problems.       Objective:      Physical Exam   Constitutional: She appears well-developed.   HENT:   Head: Normocephalic and atraumatic.   Sinus: bilateral frontal sinus tenderness         Eyes: Conjunctivae are normal. Pupils are equal, round, and reactive to light.   Neck: Normal range of motion.   Cardiovascular: Normal rate and regular rhythm.    No murmur heard.  Pulmonary/Chest: Effort normal and breath sounds normal. She has no wheezes.   Lymphadenopathy:     She has no cervical adenopathy.       Assessment:       1. Sinusitis, unspecified chronicity, unspecified location    2. Essential hypertension    3. Cervical spondylosis without myelopathy    4. Lumbar radiculopathy    5. Anxiety    6. Insomnia, unspecified type        Plan:       Sinusitis, unspecified chronicity, unspecified location    Essential hypertension    Cervical spondylosis without myelopathy    Lumbar radiculopathy    Anxiety    Insomnia, unspecified type    Other orders  -     hydrocodone-acetaminophen 5-325mg (NORCO) 5-325 mg per tablet; Take 1 tablet by mouth 2 (two) times daily as needed for Pain.  Dispense: 60 tablet; Refill: 0  -     hydrocodone-acetaminophen 5-325mg (NORCO) 5-325 mg per tablet; Take 1 tablet by mouth 2 (two) times daily as needed for Pain.  Dispense: 60 tablet; Refill: 0  -     hydrocodone-acetaminophen 5-325mg (NORCO) 5-325 mg per tablet; Take 1 tablet by mouth 2 (two) times daily as needed for Pain.  Dispense: 60 tablet; Refill: 0  -     fluticasone (FLONASE) 50 mcg/actuation nasal spray; 2 sprays (100 mcg total) by Each Nare route once daily.  Dispense: 16 g; Refill: 11  -     predniSONE (DELTASONE) 10 MG tablet; Take 4 tabs daily for 2 days then Take 3 tabs daily for 2 days thenTake 2 tabs daily for 2 days then Take 1 tab  daily for 2 days then stop  Dispense: 20 tablet; Refill: 0  -     doxycycline (MONODOX) 100 MG capsule; Take 1 capsule (100 mg total) by mouth 2 (two) times daily.  Dispense: 20 capsule; Refill: 0            Plan:  Start doxy and prednisone taper. Start flonase  rf norco 5  Cont all other meds      Medication List with Changes/Refills   New Medications    DOXYCYCLINE (MONODOX) 100 MG CAPSULE    Take 1 capsule (100 mg total) by mouth 2 (two) times daily.    FLUTICASONE (FLONASE) 50 MCG/ACTUATION NASAL SPRAY    2 sprays (100 mcg total) by Each Nare route once daily.    HYDROCODONE-ACETAMINOPHEN 5-325MG (NORCO) 5-325 MG PER TABLET    Take 1 tablet by mouth 2 (two) times daily as needed for Pain.    HYDROCODONE-ACETAMINOPHEN 5-325MG (NORCO) 5-325 MG PER TABLET    Take 1 tablet by mouth 2 (two) times daily as needed for Pain.    PREDNISONE (DELTASONE) 10 MG TABLET    Take 4 tabs daily for 2 days then Take 3 tabs daily for 2 days thenTake 2 tabs daily for 2 days then Take 1 tab daily for 2 days then stop   Current Medications    ALBUTEROL 90 MCG/ACTUATION INHALER    Inhale 2 puffs into the lungs every 6 (six) hours as needed for Wheezing.    ALPRAZOLAM (XANAX) 0.25 MG TABLET    TAKE 1 TABLET(0.25 MG) BY MOUTH TWICE DAILY AS NEEDED FOR ANXIETY    AMLODIPINE (NORVASC) 5 MG TABLET    Take 1 tablet (5 mg total) by mouth once daily.    ASCORBATE CALCIUM (VITAMIN C ORAL)    Take 1,000 mg by mouth once daily.     ASPIRIN (ECOTRIN) 81 MG EC TABLET    Take 81 mg by mouth once daily.    AZELASTINE (ASTELIN) 137 MCG (0.1 %) NASAL SPRAY    USE 2 SPRAYS IN EACH NOSTRIL EVERY DAY. MAY USE UP TO 2 TIMES DAILY DEPENDING ON SYMPTOMS    B COMPLEX VITAMINS TABLET    Take 1 tablet by mouth once daily.      CARVEDILOL (COREG) 12.5 MG TABLET    Take 1 tablet (12.5 mg total) by mouth 2 (two) times daily with meals.    DEXLANSOPRAZOLE (DEXILANT) 60 MG CAPSULE    Take 1 capsule (60 mg total) by mouth every morning. before breakfast.     DOCOSAHEXANOIC ACID/EPA (FISH OIL ORAL)    Take 1 capsule by mouth once daily.      GABAPENTIN (NEURONTIN) 300 MG CAPSULE    Take 1 capsule (300 mg total) by mouth every evening.    MULTIVIT,MIN52-FOLIC-VITK-CQ10 ORAL    Take 1 tablet by mouth every other day.    PRAVASTATIN (PRAVACHOL) 20 MG TABLET    Take 20 mg by mouth once daily.    RANITIDINE (ZANTAC) 150 MG TABLET    Take 1 tablet (150 mg total) by mouth nightly.    TRIAMCINOLONE ACETONIDE 0.1% (KENALOG) 0.1 % CREAM    AAA bid prn rash    VITAMIN B-12 1000 MCG TABLET    Take 1,000 mcg by mouth once daily.     ZOLPIDEM (AMBIEN) 10 MG TAB    TAKE 1 TABLET BY MOUTH EVERY NIGHT AT BEDTIME   Changed and/or Refilled Medications    Modified Medication Previous Medication    HYDROCODONE-ACETAMINOPHEN 5-325MG (NORCO) 5-325 MG PER TABLET hydrocodone-acetaminophen 5-325mg (NORCO) 5-325 mg per tablet       Take 1 tablet by mouth 2 (two) times daily as needed for Pain.    Take 1 tablet by mouth 2 (two) times daily as needed for Pain.   Discontinued Medications    MOMETASONE (NASONEX) 50 MCG/ACTUATION NASAL SPRAY    INSTILL 2 SPRAY IN EACH NOSTRIL EVERY DAY    ZOLPIDEM (AMBIEN) 10 MG TAB    TAKE 1 TABLET BY MOUTH EVERY NIGHT AT BEDTIME

## 2018-04-26 ENCOUNTER — INITIAL CONSULT (OUTPATIENT)
Dept: OPHTHALMOLOGY | Facility: CLINIC | Age: 73
End: 2018-04-26
Payer: MEDICARE

## 2018-04-26 DIAGNOSIS — H25.12 NUCLEAR SCLEROSIS, LEFT: ICD-10-CM

## 2018-04-26 DIAGNOSIS — H35.89 RETINAL MACULAR ATROPHY: ICD-10-CM

## 2018-04-26 DIAGNOSIS — H26.491 POSTERIOR CAPSULAR OPACIFICATION VISUALLY SIGNIFICANT OF RIGHT EYE: Primary | ICD-10-CM

## 2018-04-26 DIAGNOSIS — H33.329 RETINAL HOLE, UNSPECIFIED LATERALITY: ICD-10-CM

## 2018-04-26 DIAGNOSIS — H52.7 REFRACTIVE ERROR: ICD-10-CM

## 2018-04-26 PROCEDURE — 92014 COMPRE OPH EXAM EST PT 1/>: CPT | Mod: 57,S$GLB,, | Performed by: OPHTHALMOLOGY

## 2018-04-26 PROCEDURE — 99999 PR PBB SHADOW E&M-EST. PATIENT-LVL IV: CPT | Mod: PBBFAC,,, | Performed by: OPHTHALMOLOGY

## 2018-04-26 PROCEDURE — 66821 AFTER CATARACT LASER SURGERY: CPT | Mod: RT,S$GLB,, | Performed by: OPHTHALMOLOGY

## 2018-04-26 RX ORDER — PREDNISOLONE ACETATE 10 MG/ML
1 SUSPENSION/ DROPS OPHTHALMIC 4 TIMES DAILY
Qty: 5 ML | Refills: 0 | Status: SHIPPED | OUTPATIENT
Start: 2018-04-26 | End: 2018-05-01

## 2018-04-26 NOTE — LETTER
April 26, 2018      ELIZABETH Benitez MD  [address]  [facility] [address]           [facility]  [address] [facility] [address] [place] [address]  Phone: [phone]  Fax: [phone]          Patient: Luis Miguel Murcia   MR Number: [id number]   YOB: 1945   Date of Visit: 4/26/2018       Dear Dr. ELIZABETH Benitez:    Thank you for referring Luis Miguel Murcia to me for evaluation. Attached you will find relevant portions of my assessment and plan of care.    If you have questions, please do not hesitate to call me. I look forward to following Luis Miguel Murcia along with you.    Sincerely,    Wanda Brody MD    Enclosure  CC:  No Recipients    If you would like to receive this communication electronically, please contact [email or web address] or [phone] to request more information on [facility] access.    For providers and/or their staff who would like to refer a patient to [facility], please contact us through our one-stop-shop provider referral line, [facility], at [phone].    If you feel you have received this communication in error or would no longer like to receive these types of communications, please e-mail [email or web address]

## 2018-04-26 NOTE — PATIENT INSTRUCTIONS
What Is YAG Capsulotomy?  YAG capsulotomy is a laser eye treatment. It is used to improve your vision after cataract surgery. Your vision can become cloudy again after cataract surgery. This is sometimes called an after cataract. But it isnt a new cataract. Instead, your posterior capsule, which holds the lens in place, becomes cloudy. Your eye doctor may use YAG capsulotomy to help you see better.      Your eye after cataract surgery  When your cataract is removed, your eyes cloudy lens is replaced with a plastic lens called an IOL (intraocular lens). The IOL is placed in the posterior capsule, which held the old cloudy lens. You can see again because the IOL lets light reach your retina. The retina is a tissue layer that lines the back of your eye.  If the capsule becomes cloudy  The posterior capsule is a thin, clear film. It can become cloudy. This can happen months or even years after cataract surgery. This may block light from reaching your retina.  Date Last Reviewed: 6/13/2015  © 7140-5620 [employer]. [address] [place] [address]. All rights reserved. This information is not intended as a substitute for professional medical care. Always follow your healthcare professional's instructions.        YAG Capsulotomy: How a YAG Laser Works    A laser is a strong beam of energy that can be focused on a tiny point. A laser can be precisely controlled. This makes it safe and reliable.  The YAG laser  For a capsulotomy, your eye doctor uses a YAG laser. The YAG laser gives off fast, tiny bursts of energy. A special contact lens may be used to help focus the laser on the right spot. The laser passes through the front of your eye. It also passes through your new IOL (intraocular lens). It doesnt harm them. When the laser reaches your posterior capsule, it makes a tiny opening. Light can then enter your eye again. Your posterior capsule is still able to hold your IOL in place.     Date  Last Reviewed: 6/13/2015  © 3241-3564 [employer]. [address] [place] [address], [place] [address]. All rights reserved. This information is not intended as a substitute for professional medical care. Always follow your healthcare professional's instructions.        YAG Capsulotomy: Your Experience    YAG capsulotomy is done in your eye doctors office or at an outpatient surgery center. The treatment is usually quick and painless. You can most often return to your normal routine right away. There are no needles or stitches. There is no risk of infection. Your vision will most likely be fully restored soon after treatment.  Possible risks  Laser treatment is safe. But all procedures have some risks. The risks of this treatment include:  · Your eye pressure may rise, usually only for a short time.  · The laser can scratch your IOL. But this almost never affects vision.  · In rare cases, the retina may become detached.   Before capsulotomy  Ask your eye doctor if you need to have someone drive you to and from your treatment. Most people see clearly again right away and go home shortly after treatment.  During capsulotomy  First, your eye is numbed and your pupil is widened (dilated) with eye drops. Then, you rest your chin on a  front of the laser machine. You may see flashes of light and hear a faint clicking sound as the laser enters your eye. But you should not feel any pain. You can help by staying relaxed and still.  After capsulotomy  You should begin to see better in a few hours. Your eye doctor may check your eye pressure later that day or the next. He or she may also give you eye drops or an ointment. Once the posterior capsule (the part of the eye that holds the lens in place) has been opened, it wont make your vision cloudy again.  Call your eye doctor right away if you have any of the following:  · Increased pain  · Sudden decrease in vision  · Increased flashing lights or floaters  · A  shadow covering your vision   Date Last Reviewed: 6/13/2015  © 3114-7968 [employer]. [address] [place] [address], [place] [address]. All rights reserved. This information is not intended as a substitute for professional medical care. Always follow your healthcare professional's instructions.

## 2018-04-26 NOTE — PROGRESS NOTES
HPI     Here for Yag Eval of right eye and Cataract eval on left eye. Denies eye   pain or discomfort states eyes itch at times using Visine allergy gtt.Pt   states she is unable to drive at night because of bright lights an   oncoming head lights.    S/P Phaco IOL Right eye 9/2013     . Macular atrophy OD  S/p PPV 2009 at [facility]  Some fibrosis and atrophy    Last edited by Audelia Castaneda on 4/26/2018  1:24 PM. (History)        ROS     Positive for: Neurological (used to have restless legs, not any more;   denies seizures/tremor), Musculoskeletal (back problems), Cardiovascular   (HTN - controlled with meds per pt), Eyes (PPV OD - not sure if oil was   used), Heme/Lymph (ASA daily)    Negative for: Genitourinary (denies flomax), Endocrine (denies DM),   Respiratory (denies SOB or orthopnea)    Last edited by Wanda Brody MD on 4/26/2018  1:30 PM.   (History)        Assessment /Plan     For exam results, see Encounter Report.    Posterior capsular opacification visually significant of right eye  -     Yag Capsulotomy - OD - Right Eye    Nuclear sclerosis, left    Retinal hole, unspecified laterality    Retinal macular atrophy    Refractive error    Other orders  -     prednisoLONE acetate (PRED FORTE) 1 % DrpS; Place 1 drop into the right eye 4 (four) times daily. For 5 days then stop.  Dispense: 5 mL; Refill: 0        Posterior capsular opacification visually significant of right eye - YAG capsulotomy done today. Prednisolone QID x 5 days then d/c. RTC 1 week IOP check. Level of opacity does not match level of acuity, retina pathology will likely limit vision, will see what we can get.     Nuclear sclerosis, left - unable to significantly improve vision with MRx. Dilates well, appears visually significant. If unable to obtain driving vision after capsulotomy OD, then can discuss CE OS. Discussed risks of surgery, including complications that can lead to decreased vision or blindness, will weigh risks  "and benefits carefully before proceeding.    Retinal hole, unspecified laterality - OD - s/p PPV OD, BCVA 20/30- after CE in 2013    Retinal macular atrophy - "    Refractive error - denies improvement with MRx.    Other orders  -     prednisoLONE acetate (PRED FORTE) 1 % DrpS; Place 1 drop into the right eye 4 (four) times daily. For 5 days then stop.  Dispense: 5 mL; Refill: 0                       "

## 2018-05-07 RX ORDER — PRAVASTATIN SODIUM 20 MG/1
TABLET ORAL
Qty: 90 TABLET | Refills: 0 | Status: SHIPPED | OUTPATIENT
Start: 2018-05-07 | End: 2018-07-20 | Stop reason: SDUPTHER

## 2018-05-08 ENCOUNTER — TELEPHONE (OUTPATIENT)
Dept: OPHTHALMOLOGY | Facility: CLINIC | Age: 73
End: 2018-05-08

## 2018-05-08 NOTE — TELEPHONE ENCOUNTER
----- Message from Isai Wilson sent at 5/8/2018  9:56 AM CDT -----  Contact: same  Patient called in and wanted to reschedule her post op appt she missed on 5/4/18.  Patient was offered appt today 5/8/18 at 10:30am but could not make that one and next available is not until July.  Patient wanted to see if she could be squeezed in before then.  Patient call back number is [phone]

## 2018-05-10 ENCOUNTER — OFFICE VISIT (OUTPATIENT)
Dept: PAIN MEDICINE | Facility: CLINIC | Age: 73
End: 2018-05-10
Payer: MEDICARE

## 2018-05-10 ENCOUNTER — OFFICE VISIT (OUTPATIENT)
Dept: OPHTHALMOLOGY | Facility: CLINIC | Age: 73
End: 2018-05-10
Payer: MEDICARE

## 2018-05-10 VITALS
BODY MASS INDEX: 22.54 KG/M2 | DIASTOLIC BLOOD PRESSURE: 57 MMHG | WEIGHT: 123.25 LBS | TEMPERATURE: 97 F | OXYGEN SATURATION: 98 % | SYSTOLIC BLOOD PRESSURE: 127 MMHG | RESPIRATION RATE: 18 BRPM | HEART RATE: 58 BPM

## 2018-05-10 DIAGNOSIS — H25.12 NUCLEAR SCLEROSIS, LEFT: ICD-10-CM

## 2018-05-10 DIAGNOSIS — Z98.890 POST-OPERATIVE STATE: Primary | ICD-10-CM

## 2018-05-10 DIAGNOSIS — M54.16 LUMBAR RADICULOPATHY: ICD-10-CM

## 2018-05-10 DIAGNOSIS — H52.7 REFRACTIVE ERROR: ICD-10-CM

## 2018-05-10 DIAGNOSIS — H33.329 RETINAL HOLE, UNSPECIFIED LATERALITY: ICD-10-CM

## 2018-05-10 DIAGNOSIS — M51.36 DDD (DEGENERATIVE DISC DISEASE), LUMBAR: Primary | ICD-10-CM

## 2018-05-10 DIAGNOSIS — H35.89 RETINAL MACULAR ATROPHY: ICD-10-CM

## 2018-05-10 PROCEDURE — 99213 OFFICE O/P EST LOW 20 MIN: CPT | Mod: S$GLB,,, | Performed by: PHYSICIAN ASSISTANT

## 2018-05-10 PROCEDURE — 99999 PR PBB SHADOW E&M-EST. PATIENT-LVL V: CPT | Mod: PBBFAC,,, | Performed by: PHYSICIAN ASSISTANT

## 2018-05-10 PROCEDURE — 3078F DIAST BP <80 MM HG: CPT | Mod: CPTII,S$GLB,, | Performed by: PHYSICIAN ASSISTANT

## 2018-05-10 PROCEDURE — 99024 POSTOP FOLLOW-UP VISIT: CPT | Mod: S$GLB,,, | Performed by: OPHTHALMOLOGY

## 2018-05-10 PROCEDURE — 3074F SYST BP LT 130 MM HG: CPT | Mod: CPTII,S$GLB,, | Performed by: PHYSICIAN ASSISTANT

## 2018-05-10 PROCEDURE — 99999 PR PBB SHADOW E&M-EST. PATIENT-LVL III: CPT | Mod: PBBFAC,,, | Performed by: OPHTHALMOLOGY

## 2018-05-10 NOTE — PROGRESS NOTES
"HPI     Post-op Evaluation    Additional comments: od           Comments   1 week post Yag Cap Right eye, denies eye pain noticed new floater right   eye since laser no flashes. Finished eye gtt taper as directed.     Agree with above. Sees much better since laser OD, notices some   anisometropia between eyes if she covers them.        Last edited by Wanda Brody MD on 5/10/2018  2:47 PM.   (History)            Assessment /Plan     For exam results, see Encounter Report.    Post-operative state    Nuclear sclerosis, left    Retinal hole, unspecified laterality    Retinal macular atrophy    Refractive error            Posterior capsular opacification visually significant of right eye - YAG capsulotomy done OD 4/26/18.     Nuclear sclerosis, left - unable to significantly improve vision with MRx. Dilates well, appears visually significant. Discussed risks of surgery, including complications that can lead to decreased vision or blindness, will weigh risks and benefits carefully before proceeding. Re-check 6 months.    Retinal hole, unspecified laterality - OD - s/p PPV OD, BCVA 20/30- after CE in 2013    Retinal macular atrophy - "    Refractive error - denies improvement with MRx.    Other orders  -     prednisoLONE acetate (PRED FORTE) 1 % DrpS; Place 1 drop into the right eye 4 (four) times daily. For 5 days then stop.  Dispense: 5 mL; Refill: 0                       "

## 2018-05-10 NOTE — PROGRESS NOTES
"CC: back pain    HPI: The patient is a 73-year-old woman with a history of hypertension, cervical lumbar disc degeneration, osteopenia presents in referral from Dr. Jeong for chronic neck and low back pain.  She is status post right L5/S1 transforaminal epidural steroid injection on 3/27/18 with mild relief lasting about a month, now reporting 0% relief.  She describes pain in the right buttock, posterior thigh, lateral calf, right ankle and right foot.  This is worse with driving and improved with Biofreeze.  She denies numbness, weakness, bladder or bowel incontinence.    Pain intervention history: She has not had any epidural steroid injections, has been taking baclofen and rarely takes hydrocodone with mild relief. Radiofrequency ablation of the Bilateral L3,4,5 medial branch nerves on 12/18/13 with no relief of pain, however she reports on 8/19/14 that she had significant relief with the procedure.  She is status post bilateral SI joint injections on 3/1/13 with excellent relief.  She reports having "steroid psychosis" when she received IV steroids several years ago but did not have any adverse effects with SI joint injections in 2013.  She is status post C7-T1 cervical interlaminar epidural steroid injection on 3/2/15 with 20% relief.  She is status post left C3, 4, 5 and 6 medial branch radiofrequency ablation on 5/27/15 with 25% relief.  She is status post L5/S1 interlaminar epidural steroid injection on 7/8/15 with 100% relief of her right buttock and right leg pain, 0% relief of her back pain.  She is status post bilateral L3, 4, 5 medial branch radiofrequency ablation on 8/18/15 with 40% relief of her bilateral low back pain.  She is status post right L5 transforaminal epidural steroid injection on 5/2/16 with 60% relief.   She is status post right L5/S1 transforaminal epidural steroid injection on 3/27/18 with mild relief lasting about a month, now reporting 0% relief.     ROS:She reports headaches, " back pain and difficulty sleeping.  Balance of review of systems is negative.    Medical, surgical, family and social history reviewed elsewhere in record.    Medications/Allergies: See med card    Vitals:    05/10/18 1317   BP: (!) 127/57   Pulse: (!) 58   Resp: 18   Temp: 96.5 °F (35.8 °C)   TempSrc: Oral   SpO2: 98%   Weight: 55.9 kg (123 lb 3.8 oz)   PainSc:   4   PainLoc: Back         Physical exam:  Gen: A and O x3, pleasant, well-groomed  Skin: No rashes or obvious lesions  HEENT: PERRLA  CVS: Regular rate and rhythm, normal S1 and S2, no murmurs.  Resp: Clear to auscultation bilaterally, no wheezes or rales.  Musculoskeletal: Able to heel walk, toe walk. No antalgic gait.     Neuro:  Lower extremities: 5/5 strength bilaterally  Reflexes: Patellar 2+, Achilles 2+.  Sensory: Intact and symmetrical to light touch and pinprick in L2-S1 dermatomes bilaterally.    Lumbar spine:  Lumbar spine: ROM is full with flexion and extension with increased right buttock pain during flexion.  Roberto's test causes right low back pain.  Supine straight leg raise is positive for right buttock and posterior thigh pain.  Internal and external rotation of the hip causes right low back pain.  Myofascial exam: Mild tenderness to palpation to the right upper gluteal region.      Imaging:  C-spine MRI 9/30/10: There is anterolisthesis of C3 on 4 and 4 on 5 mile, retrolisthesis of C5 and 6.  At C3/4 there is a mild bulge with foraminal narrowing bilaterally mild.  At C5-C6 there is a disc bulge with right upper protrusion of the disc but severe foraminal narrowing on the right and moderate to severe on the left.  There is disc bulging at C6/7 with a bulge more to the right.    MRI L-spine 5/2011: At L3/4 there is mild anterolisthesis, Modic endplate changes.  There is bilateral severe facet arthropathy and a broad-based disc bulge more to the left with compression of the descending L4 nerve root and slight effacement of the L3 nerve  root at that level.  At L4-5 there is bilateral facet arthropathy.  At L5/S1 there is mild disc bulge and arthropathy. There is also Ligamentous hypertrophy at L4/5 posteriorly causing some narrowing of the central canal.    MRI cervical spine 1/23/15  C2/C3: Mild uncovertebral and facet induced neural foraminal narrowing is noted bilaterally.  C3/C4: Annular disk bulge is evident and there is moderate left greater than right uncovertebral and facet and is neural foraminal narrowing.  C4/C5: Uncovertebral induced neural foraminal narrowing is noted bilaterally, left greater than right a moderate degree. There is mild annular disk bulge with only mild distortion of the central canal.  C5/C6: Annular disk bulge and osteophyte formation combined with posterior canal ligamentous hypertrophy to produce moderate canal stenosis. The canal is narrowed to 9 mm the cord is contacted. There is prominent right greater than left uncovertebral and facet induced neural foraminal narrowing.  C6/C7: Disk bulge and osteophyte formation combined with posterior canal ligamentous hypertrophy to produce mild/moderate canal stenosis. The cord is contacted. Moderate right greater than left uncovertebral and facet induced neural foraminal narrowingis noted.  C7/T1: Degenerative facet changes are present I do not see evidence of significant canal or foraminal stenosis.     MRI lumbar spine 1/23/15  T11/T12: Disk bulge is evident with mild central canal stenosis.  T12/L1: There is no evidence of distribution, canal or foraminal stenosis.  L1/L2: Minimal annular disk bulging is evidence of mild degenerative facet changes are noted without evidence of significant canal or foraminal stenosis.  L2/L3: Mild annular disk bulging is evident and this combines with degenerative facet changes to produce minimal canal and foraminal distortion.  L3/L4: Degenerative facet changes are noted and there is a left paracentral and posterior lateral disk  extrusion with moderate narrowing of the inferior left foramen. The central canal is intact. There is also suggestion of a small right posterior lateral disk protrusion.   L4/L5: There is annular disk bulging this combines with degenerative facet and ligamentous hypertrophic change to produce mild canal and foraminal narrowing.  L5/S1: A small central disk protrusion is present causing mild distortion of the central canal. Degenerative facet changes are noted in the right foramen is moderately narrowed.       Assessment:  The patient is a 73-year-old woman with a history of hypertension, cervical lumbar disc degeneration, osteopenia presents in referral from Dr. Jeong for chronic neck and low back pain.     1. DDD (degenerative disc disease), lumbar  MRI Lumbar Spine Without Contrast   2. Lumbar radiculopathy           Plan:  1.  The patient did not have significant relief following the last injection so I will update her lumbar spine MRI for further evaluation.  We will call her with the results and recommendations.  We may have her see neurosurgery for further evaluation if there is a significant change versus scheduling another interventional procedure.

## 2018-05-24 ENCOUNTER — HOSPITAL ENCOUNTER (OUTPATIENT)
Dept: RADIOLOGY | Facility: HOSPITAL | Age: 73
Discharge: HOME | End: 2018-05-24
Attending: PHYSICIAN ASSISTANT
Payer: MEDICARE

## 2018-05-24 DIAGNOSIS — M51.36 DDD (DEGENERATIVE DISC DISEASE), LUMBAR: ICD-10-CM

## 2018-05-24 PROCEDURE — 72148 MRI LUMBAR SPINE W/O DYE: CPT | Mod: TC,PO

## 2018-05-24 PROCEDURE — 72148 MRI LUMBAR SPINE W/O DYE: CPT | Mod: 26,,, | Performed by: RADIOLOGY

## 2018-05-28 ENCOUNTER — TELEPHONE (OUTPATIENT)
Dept: PAIN MEDICINE | Facility: CLINIC | Age: 73
End: 2018-05-28

## 2018-05-28 DIAGNOSIS — M54.16 LUMBAR RADICULOPATHY: Primary | ICD-10-CM

## 2018-05-28 RX ORDER — ALPRAZOLAM 0.5 MG/1
1 TABLET, ORALLY DISINTEGRATING ORAL ONCE AS NEEDED
Status: CANCELLED | OUTPATIENT
Start: 2018-06-21 | End: 2018-06-21

## 2018-05-28 NOTE — TELEPHONE ENCOUNTER
Spoke with patient regarding results. She verbalized understanding. Procedure scheduled for 6/21 with 4 week follow up. Pre-op instructions reviewed and sent to patient.

## 2018-05-28 NOTE — TELEPHONE ENCOUNTER
Patient is scheduled for a lumbar steroid injection on 6/21 and will need to stop aspirin 7 days before. Please advise if this is okay. Thanks.

## 2018-05-28 NOTE — TELEPHONE ENCOUNTER
Please let the patient know I reviewed her lumbar spine MRI results.  There have been no major changes.  I suggest trying another injection but taking a different approach.  Please schedule her for an L5/S1 interlaminar DEQUAN to the right with four-week follow-up.

## 2018-06-12 DIAGNOSIS — K21.9 LPRD (LARYNGOPHARYNGEAL REFLUX DISEASE): ICD-10-CM

## 2018-06-14 RX ORDER — DEXLANSOPRAZOLE 60 MG/1
CAPSULE, DELAYED RELEASE ORAL
Qty: 90 CAPSULE | Refills: 3 | Status: SHIPPED | OUTPATIENT
Start: 2018-06-14 | End: 2019-05-27 | Stop reason: SDUPTHER

## 2018-06-20 ENCOUNTER — TELEPHONE (OUTPATIENT)
Dept: SURGERY | Facility: HOSPITAL | Age: 73
End: 2018-06-20

## 2018-06-20 ENCOUNTER — TELEPHONE (OUTPATIENT)
Dept: PAIN MEDICINE | Facility: CLINIC | Age: 73
End: 2018-06-20

## 2018-06-20 DIAGNOSIS — M51.36 DDD (DEGENERATIVE DISC DISEASE), LUMBAR: Primary | ICD-10-CM

## 2018-06-20 NOTE — TELEPHONE ENCOUNTER
When doing pre op call, patient stated that she had not stopped her aspirin or fish oil in the appropriate amount of time. She will only be off 3 days by the procedure date. Will she need to reschedule?

## 2018-06-20 NOTE — TELEPHONE ENCOUNTER
----- Message from Vickie Randolph sent at 6/19/2018  1:10 PM CDT -----  Contact: self [phone]  Please call her regarding her procedure Thursday.  She forgot to stop the aspirin and the fish oil, but she did not take it today.  Is that OK?  If she does not answer please leave her a voice mail if she does not answer.  Thank you!

## 2018-06-20 NOTE — TELEPHONE ENCOUNTER
Spoke to patient and informed that she would need to reschedule her procedure due to not stopping her blood thinners. Patient verbalized understanding and asked that the office call her and try to fit her into the next available appointment.

## 2018-06-24 RX ORDER — ALBUTEROL SULFATE 90 UG/1
AEROSOL, METERED RESPIRATORY (INHALATION)
Qty: 18 G | Refills: 5 | Status: SHIPPED | OUTPATIENT
Start: 2018-06-24 | End: 2019-08-09 | Stop reason: SDUPTHER

## 2018-07-05 ENCOUNTER — HOSPITAL ENCOUNTER (OUTPATIENT)
Facility: HOSPITAL | Age: 73
Discharge: HOME | End: 2018-07-05
Attending: ANESTHESIOLOGY | Admitting: ANESTHESIOLOGY
Payer: MEDICARE

## 2018-07-05 ENCOUNTER — SURGERY (OUTPATIENT)
Age: 73
End: 2018-07-05

## 2018-07-05 ENCOUNTER — HOSPITAL ENCOUNTER (OUTPATIENT)
Dept: RADIOLOGY | Facility: HOSPITAL | Age: 73
Discharge: HOME | End: 2018-07-05
Attending: ANESTHESIOLOGY | Admitting: ANESTHESIOLOGY
Payer: MEDICARE

## 2018-07-05 VITALS
WEIGHT: 121 LBS | OXYGEN SATURATION: 98 % | HEART RATE: 64 BPM | SYSTOLIC BLOOD PRESSURE: 132 MMHG | BODY MASS INDEX: 22.26 KG/M2 | HEIGHT: 62 IN | DIASTOLIC BLOOD PRESSURE: 64 MMHG | TEMPERATURE: 98 F | RESPIRATION RATE: 18 BRPM

## 2018-07-05 DIAGNOSIS — M51.36 DDD (DEGENERATIVE DISC DISEASE), LUMBAR: ICD-10-CM

## 2018-07-05 DIAGNOSIS — M54.16 LUMBAR RADICULOPATHY: Primary | ICD-10-CM

## 2018-07-05 PROCEDURE — 62323 NJX INTERLAMINAR LMBR/SAC: CPT | Mod: ,,, | Performed by: ANESTHESIOLOGY

## 2018-07-05 PROCEDURE — 76000 FLUOROSCOPY <1 HR PHYS/QHP: CPT | Mod: TC,PO

## 2018-07-05 PROCEDURE — 25000003 PHARM REV CODE 250: Mod: PO | Performed by: ANESTHESIOLOGY

## 2018-07-05 PROCEDURE — 25500020 PHARM REV CODE 255: Mod: PO | Performed by: ANESTHESIOLOGY

## 2018-07-05 PROCEDURE — 62323 NJX INTERLAMINAR LMBR/SAC: CPT | Mod: PO | Performed by: ANESTHESIOLOGY

## 2018-07-05 PROCEDURE — A4216 STERILE WATER/SALINE, 10 ML: HCPCS | Mod: PO | Performed by: ANESTHESIOLOGY

## 2018-07-05 PROCEDURE — 63600175 PHARM REV CODE 636 W HCPCS: Mod: PO | Performed by: ANESTHESIOLOGY

## 2018-07-05 RX ORDER — METHYLPREDNISOLONE ACETATE 80 MG/ML
INJECTION, SUSPENSION INTRA-ARTICULAR; INTRALESIONAL; INTRAMUSCULAR; SOFT TISSUE
Status: DISCONTINUED | OUTPATIENT
Start: 2018-07-05 | End: 2018-07-05 | Stop reason: HOSPADM

## 2018-07-05 RX ORDER — ALPRAZOLAM 0.5 MG/1
1 TABLET, ORALLY DISINTEGRATING ORAL ONCE AS NEEDED
Status: COMPLETED | OUTPATIENT
Start: 2018-07-05 | End: 2018-07-05

## 2018-07-05 RX ORDER — LIDOCAINE HYDROCHLORIDE 10 MG/ML
INJECTION, SOLUTION EPIDURAL; INFILTRATION; INTRACAUDAL; PERINEURAL
Status: DISCONTINUED | OUTPATIENT
Start: 2018-07-05 | End: 2018-07-05 | Stop reason: HOSPADM

## 2018-07-05 RX ORDER — SODIUM CHLORIDE 9 MG/ML
INJECTION, SOLUTION INTRAMUSCULAR; INTRAVENOUS; SUBCUTANEOUS
Status: DISCONTINUED | OUTPATIENT
Start: 2018-07-05 | End: 2018-07-05 | Stop reason: HOSPADM

## 2018-07-05 RX ADMIN — ALPRAZOLAM 1 MG: 0.5 TABLET, ORALLY DISINTEGRATING ORAL at 03:07

## 2018-07-05 RX ADMIN — LIDOCAINE HYDROCHLORIDE 10 ML: 10 INJECTION, SOLUTION EPIDURAL; INFILTRATION; INTRACAUDAL; PERINEURAL at 04:07

## 2018-07-05 RX ADMIN — METHYLPREDNISOLONE ACETATE 80 MG: 80 INJECTION, SUSPENSION INTRA-ARTICULAR; INTRALESIONAL; INTRAMUSCULAR; SOFT TISSUE at 04:07

## 2018-07-05 RX ADMIN — IOHEXOL 3 ML: 300 INJECTION, SOLUTION INTRAVENOUS at 04:07

## 2018-07-05 RX ADMIN — SODIUM CHLORIDE 4 ML: 9 INJECTION INTRAMUSCULAR; INTRAVENOUS; SUBCUTANEOUS at 04:07

## 2018-07-05 NOTE — DISCHARGE SUMMARY
[facility]  Discharge Note  Short Stay    Admit Date: 7/5/2018    Discharge Date: 7/5/2018    Attending Physician: David Maza MD     Discharge Provider: David Maza    Diagnoses:  Active Hospital Problems    Diagnosis  POA    *Lumbar radiculopathy [M54.16]  Yes      Resolved Hospital Problems    Diagnosis Date Resolved POA   No resolved problems to display.       Discharged Condition: good    Final Diagnoses: Lumbar radiculopathy [M54.16]    Disposition: Home or Self Care    Hospital Course: no complications, uneventful    Outcome of Hospitalization, Treatment, Procedure, or Surgery:  Patient was admitted for outpatient procedure. The patient underwent procedure without complications and are discharged home    Follow up/Patient Instructions:  Follow up as scheduled in Pain Management clinic in 3-4 weeks/Patient has received instructions and follow up date and time    Medications:  Continue previous medications      Discharge Procedure Orders  Call MD for:  temperature >100.4     Call MD for:  severe uncontrolled pain     Call MD for:  redness, tenderness, or signs of infection (pain, swelling, redness, odor or green/yellow discharge around incision site)     Call MD for:  severe persistent headache     No dressing needed           Discharge Procedure Orders (must include Diet, Follow-up, Activity):    Discharge Procedure Orders (must include Diet, Follow-up, Activity)  Call MD for:  temperature >100.4     Call MD for:  severe uncontrolled pain     Call MD for:  redness, tenderness, or signs of infection (pain, swelling, redness, odor or green/yellow discharge around incision site)     Call MD for:  severe persistent headache     No dressing needed
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
## 2018-07-05 NOTE — OP NOTE

## 2018-07-18 RX ORDER — CARVEDILOL 12.5 MG/1
TABLET ORAL
Qty: 180 TABLET | Refills: 3 | Status: SHIPPED | OUTPATIENT
Start: 2018-07-18 | End: 2019-07-23 | Stop reason: SDUPTHER

## 2018-07-20 ENCOUNTER — TELEPHONE (OUTPATIENT)
Dept: PAIN MEDICINE | Facility: CLINIC | Age: 73
End: 2018-07-20

## 2018-07-20 ENCOUNTER — OFFICE VISIT (OUTPATIENT)
Dept: FAMILY MEDICINE | Facility: CLINIC | Age: 73
End: 2018-07-20
Payer: MEDICARE

## 2018-07-20 VITALS
HEART RATE: 62 BPM | HEIGHT: 62 IN | RESPIRATION RATE: 18 BRPM | DIASTOLIC BLOOD PRESSURE: 70 MMHG | WEIGHT: 123.88 LBS | BODY MASS INDEX: 22.8 KG/M2 | SYSTOLIC BLOOD PRESSURE: 128 MMHG

## 2018-07-20 DIAGNOSIS — M51.36 DDD (DEGENERATIVE DISC DISEASE), LUMBAR: Primary | ICD-10-CM

## 2018-07-20 DIAGNOSIS — J44.9 CHRONIC OBSTRUCTIVE PULMONARY DISEASE, UNSPECIFIED COPD TYPE: ICD-10-CM

## 2018-07-20 DIAGNOSIS — I10 ESSENTIAL HYPERTENSION: Primary | ICD-10-CM

## 2018-07-20 DIAGNOSIS — F41.9 ANXIETY: ICD-10-CM

## 2018-07-20 DIAGNOSIS — M54.16 LUMBAR RADICULOPATHY: ICD-10-CM

## 2018-07-20 DIAGNOSIS — E78.5 HYPERLIPIDEMIA, UNSPECIFIED HYPERLIPIDEMIA TYPE: ICD-10-CM

## 2018-07-20 DIAGNOSIS — G47.00 INSOMNIA, UNSPECIFIED TYPE: ICD-10-CM

## 2018-07-20 PROCEDURE — 99999 PR PBB SHADOW E&M-EST. PATIENT-LVL III: CPT | Mod: PBBFAC,,, | Performed by: FAMILY MEDICINE

## 2018-07-20 PROCEDURE — 99214 OFFICE O/P EST MOD 30 MIN: CPT | Mod: S$GLB,,, | Performed by: FAMILY MEDICINE

## 2018-07-20 PROCEDURE — 3074F SYST BP LT 130 MM HG: CPT | Mod: CPTII,S$GLB,, | Performed by: FAMILY MEDICINE

## 2018-07-20 PROCEDURE — 3078F DIAST BP <80 MM HG: CPT | Mod: CPTII,S$GLB,, | Performed by: FAMILY MEDICINE

## 2018-07-20 RX ORDER — HYDROCODONE BITARTRATE AND ACETAMINOPHEN 5; 325 MG/1; MG/1
1 TABLET ORAL 2 TIMES DAILY PRN
Qty: 60 TABLET | Refills: 0 | Status: SHIPPED | OUTPATIENT
Start: 2018-08-26 | End: 2018-09-25

## 2018-07-20 RX ORDER — PRAVASTATIN SODIUM 20 MG/1
TABLET ORAL
Qty: 90 TABLET | Refills: 3 | Status: SHIPPED | OUTPATIENT
Start: 2018-07-20 | End: 2019-04-22 | Stop reason: SDUPTHER

## 2018-07-20 RX ORDER — ALPRAZOLAM 0.25 MG/1
TABLET ORAL
Qty: 60 TABLET | Refills: 5 | Status: SHIPPED | OUTPATIENT
Start: 2018-07-20 | End: 2019-01-05 | Stop reason: SDUPTHER

## 2018-07-20 RX ORDER — ZOLPIDEM TARTRATE 10 MG/1
TABLET ORAL
Qty: 30 TABLET | Refills: 5 | Status: SHIPPED | OUTPATIENT
Start: 2018-07-20 | End: 2018-08-16 | Stop reason: SDUPTHER

## 2018-07-20 RX ORDER — HYDROCODONE BITARTRATE AND ACETAMINOPHEN 5; 325 MG/1; MG/1
1 TABLET ORAL 2 TIMES DAILY PRN
Qty: 60 TABLET | Refills: 0 | Status: SHIPPED | OUTPATIENT
Start: 2018-09-25 | End: 2018-10-22 | Stop reason: SDUPTHER

## 2018-07-20 RX ORDER — HYDROCODONE BITARTRATE AND ACETAMINOPHEN 5; 325 MG/1; MG/1
1 TABLET ORAL 2 TIMES DAILY PRN
Qty: 60 TABLET | Refills: 0 | Status: SHIPPED | OUTPATIENT
Start: 2018-07-27 | End: 2018-08-26

## 2018-07-20 RX ORDER — UBIDECARENONE 30 MG
CAPSULE ORAL
COMMUNITY
Start: 2018-05-05

## 2018-07-20 NOTE — PROGRESS NOTES
Subjective:       Patient ID: Luis Miguel Murcia is a 73 y.o. female.    Chief Complaint: 3 Month Follow-up    HPI     Here for a f/u.    htn is stable. No c/o cp or sob at rest or exertion. No headaches.      C/o chronic right lumbar radicular pain > 1 year. Sees pain management. Ps: 2-3/10 while on norco. Reports mild relief from poppy L5-S1 in 7/5/2018. Has residual constant neuropathy from right calf to right foot.      Also, has chronic neck pain > 1 year. Sees pain management. Ps: 2-3/10 while on norco       Imaging:  C-spine MRI 9/30/10: There is anterolisthesis of C3 on 4 and 4 on 5 mile, retrolisthesis of C5 and 6. At C3/4 there is a mild bulge with foraminal narrowing bilaterally mild. At C5-C6 there is a disc bulge with right upper protrusion of the disc but severe foraminal narrowing on the right and moderate to severe on the left. There is disc bulging at C6/7 with a bulge more to the right.     MRI L-spine 5/2011: At L3/4 there is mild anterolisthesis, Modic endplate changes. There is bilateral severe facet arthropathy and a broad-based disc bulge more to the left with compression of the descending L4 nerve root and slight effacement of the L3 nerve root at that level. At L4-5 there is bilateral facet arthropathy. At L5/S1 there is mild disc bulge and arthropathy. There is also Ligamentous hypertrophy at L4/5 posteriorly causing some narrowing of the central canal.     Anxiety stable while taking xanax.     Copd stable. No worsening cough or wheeze.     Insomnia stable while on ambien.          Review of Systems      Review of Systems   Constitutional: Negative for fever and chills.   HENT: Negative for hearing loss and neck stiffness.    Eyes: Negative for redness and itching.   Respiratory: Negative for cough and choking.    Cardiovascular: Negative for chest pain and leg swelling.  Abdomen: Negative for abdominal pain and blood in stool.   Genitourinary: Negative for dysuria and flank pain.    Musculoskeletal: Negative for gait problem.   Neurological: Negative for light-headedness and headaches.   Hematological: Negative for adenopathy.   Psychiatric/Behavioral: Negative for behavioral problems.     Objective:      Physical Exam   Constitutional: She appears well-developed.   HENT:   Head: Normocephalic and atraumatic.   Eyes: Conjunctivae are normal. Pupils are equal, round, and reactive to light.   Neck: Normal range of motion.   Cardiovascular: Normal rate and regular rhythm.    No murmur heard.  Pulmonary/Chest: Effort normal and breath sounds normal. She has no wheezes.   Lymphadenopathy:     She has no cervical adenopathy.       Assessment:       1. Essential hypertension    2. Hyperlipidemia, unspecified hyperlipidemia type    3. Lumbar radiculopathy    4. Anxiety    5. Insomnia, unspecified type    6. Chronic obstructive pulmonary disease, unspecified COPD type        Plan:       Essential hypertension    Hyperlipidemia, unspecified hyperlipidemia type  -     Comprehensive metabolic panel; Future; Expected date: 07/20/2018  -     Lipid panel; Future; Expected date: 07/20/2018  -     TSH; Future; Expected date: 07/20/2018    Lumbar radiculopathy    Anxiety    Insomnia, unspecified type    Chronic obstructive pulmonary disease, unspecified COPD type    Other orders  -     HYDROcodone-acetaminophen (NORCO) 5-325 mg per tablet; Take 1 tablet by mouth 2 (two) times daily as needed for Pain.  Dispense: 60 tablet; Refill: 0  -     HYDROcodone-acetaminophen (NORCO) 5-325 mg per tablet; Take 1 tablet by mouth 2 (two) times daily as needed for Pain.  Dispense: 60 tablet; Refill: 0  -     HYDROcodone-acetaminophen (NORCO) 5-325 mg per tablet; Take 1 tablet by mouth 2 (two) times daily as needed for Pain.  Dispense: 60 tablet; Refill: 0  -     pravastatin (PRAVACHOL) 20 MG tablet; TAKE 1 TABLET(20 MG) BY MOUTH EVERY NIGHT  Dispense: 90 tablet; Refill: 3  -     ALPRAZolam (XANAX) 0.25 MG tablet; TAKE 1  TABLET(0.25 MG) BY MOUTH TWICE DAILY AS NEEDED FOR ANXIETY  Dispense: 60 tablet; Refill: 5  -     zolpidem (AMBIEN) 10 mg Tab; TAKE 1 TABLET BY MOUTH EVERY NIGHT AT BEDTIME  Dispense: 30 tablet; Refill: 5          Plan:  See orders  Cont current meds        Medication List with Changes/Refills   New Medications    HYDROCODONE-ACETAMINOPHEN (NORCO) 5-325 MG PER TABLET    Take 1 tablet by mouth 2 (two) times daily as needed for Pain.    HYDROCODONE-ACETAMINOPHEN (NORCO) 5-325 MG PER TABLET    Take 1 tablet by mouth 2 (two) times daily as needed for Pain.   Current Medications    AMLODIPINE (NORVASC) 5 MG TABLET    Take 1 tablet (5 mg total) by mouth once daily.    ASCORBATE CALCIUM (VITAMIN C ORAL)    Take 1,000 mg by mouth once daily.     ASPIRIN (ECOTRIN) 81 MG EC TABLET    Take 81 mg by mouth once daily.    AZELASTINE (ASTELIN) 137 MCG (0.1 %) NASAL SPRAY    USE 2 SPRAYS IN EACH NOSTRIL EVERY DAY. MAY USE UP TO 2 TIMES DAILY DEPENDING ON SYMPTOMS    B COMPLEX VITAMINS TABLET    Take 1 tablet by mouth once daily.      CALCIUM CITRATE-VITAMIN D3 ORAL        CARVEDILOL (COREG) 12.5 MG TABLET    TAKE 1 TABLET(12.5 MG) BY MOUTH TWICE DAILY WITH MEALS    CO-ENZYME Q-10 30 MG CAPSULE        DEXILANT 60 MG CAPSULE    TAKE 1 CAPSULE(60 MG) BY MOUTH EVERY MORNING BEFORE BREAKFAST    DOCOSAHEXANOIC ACID/EPA (FISH OIL ORAL)    Take 1 capsule by mouth once daily.      FLUTICASONE (FLONASE) 50 MCG/ACTUATION NASAL SPRAY    2 sprays (100 mcg total) by Each Nare route once daily.    GABAPENTIN (NEURONTIN) 300 MG CAPSULE    Take 1 capsule (300 mg total) by mouth every evening.    PRAVASTATIN (PRAVACHOL) 20 MG TABLET    Take 20 mg by mouth once daily.    RANITIDINE (ZANTAC) 150 MG TABLET    TAKE 1 TABLET(150 MG) BY MOUTH EVERY NIGHT    TRIAMCINOLONE ACETONIDE 0.1% (KENALOG) 0.1 % CREAM    AAA bid prn rash    VENTOLIN HFA 90 MCG/ACTUATION INHALER    INHALE 2 PUFFS INTO THE LUNGS EVERY 6 HOURS AS NEEDED FOR WHEEZING    VITAMIN B-12  1000 MCG TABLET    Take 1,000 mcg by mouth once daily.    Changed and/or Refilled Medications    Modified Medication Previous Medication    ALPRAZOLAM (XANAX) 0.25 MG TABLET ALPRAZolam (XANAX) 0.25 MG tablet       TAKE 1 TABLET(0.25 MG) BY MOUTH TWICE DAILY AS NEEDED FOR ANXIETY    TAKE 1 TABLET(0.25 MG) BY MOUTH TWICE DAILY AS NEEDED FOR ANXIETY    HYDROCODONE-ACETAMINOPHEN (NORCO) 5-325 MG PER TABLET hydrocodone-acetaminophen 5-325mg (NORCO) 5-325 mg per tablet       Take 1 tablet by mouth 2 (two) times daily as needed for Pain.    Take 1 tablet by mouth 2 (two) times daily as needed for Pain.    PRAVASTATIN (PRAVACHOL) 20 MG TABLET pravastatin (PRAVACHOL) 20 MG tablet       TAKE 1 TABLET(20 MG) BY MOUTH EVERY NIGHT    TAKE 1 TABLET(20 MG) BY MOUTH EVERY NIGHT    ZOLPIDEM (AMBIEN) 10 MG TAB zolpidem (AMBIEN) 10 mg Tab       TAKE 1 TABLET BY MOUTH EVERY NIGHT AT BEDTIME    TAKE 1 TABLET BY MOUTH EVERY NIGHT AT BEDTIME   Discontinued Medications    MULTIVIT,[id number] ORAL    Take 1 tablet by mouth every other day.

## 2018-07-20 NOTE — TELEPHONE ENCOUNTER
Appointment canceled for today, patient states she didn't get any relief from injection. Just wanted to know what's next. States it's too hot for her to be coming out because of her COPD.

## 2018-07-30 ENCOUNTER — OFFICE VISIT (OUTPATIENT)
Dept: OBSTETRICS AND GYNECOLOGY | Facility: CLINIC | Age: 73
End: 2018-07-30
Payer: MEDICARE

## 2018-07-30 ENCOUNTER — TELEPHONE (OUTPATIENT)
Dept: PAIN MEDICINE | Facility: CLINIC | Age: 73
End: 2018-07-30

## 2018-07-30 VITALS
DIASTOLIC BLOOD PRESSURE: 64 MMHG | HEIGHT: 62 IN | RESPIRATION RATE: 15 BRPM | WEIGHT: 121.69 LBS | BODY MASS INDEX: 22.39 KG/M2 | SYSTOLIC BLOOD PRESSURE: 118 MMHG

## 2018-07-30 DIAGNOSIS — Z01.419 ENCOUNTER FOR GYNECOLOGICAL EXAMINATION WITHOUT ABNORMAL FINDING: ICD-10-CM

## 2018-07-30 DIAGNOSIS — R10.2 PAIN IN VULVA: Primary | ICD-10-CM

## 2018-07-30 PROCEDURE — 99999 PR PBB SHADOW E&M-EST. PATIENT-LVL V: CPT | Mod: PBBFAC,,, | Performed by: OBSTETRICS & GYNECOLOGY

## 2018-07-30 PROCEDURE — 3074F SYST BP LT 130 MM HG: CPT | Mod: CPTII,S$GLB,, | Performed by: OBSTETRICS & GYNECOLOGY

## 2018-07-30 PROCEDURE — 3078F DIAST BP <80 MM HG: CPT | Mod: CPTII,S$GLB,, | Performed by: OBSTETRICS & GYNECOLOGY

## 2018-07-30 PROCEDURE — 88175 CYTOPATH C/V AUTO FLUID REDO: CPT

## 2018-07-30 PROCEDURE — 99203 OFFICE O/P NEW LOW 30 MIN: CPT | Mod: S$GLB,,, | Performed by: OBSTETRICS & GYNECOLOGY

## 2018-07-30 NOTE — TELEPHONE ENCOUNTER
----- Message from Jessy Watts sent at 7/27/2018 12:58 PM CDT -----  Contact: self  Patient need to speak to nurse regarding 2 procedures she had in the past and patient states she was suppose to be referred to urologist but does not know name     Please call to advice [phone] (home)

## 2018-07-30 NOTE — PROGRESS NOTES
Subjective:       Patient ID: Luis Miguel Murcia is a 73 y.o. female.    Chief Complaint:  Vaginal Pain (states she saw a shiny ball coming out of the vagina)      History of Present Illness  HPI  73 y.o.  with complaint of pain involving left inner labia off and on for past year. Patient also reports noticing a bulge at vaginal area last week.    GYN & OB History  No LMP recorded. Patient has had a hysterectomy. vaginal hysterectomy in  for abnormal pap smear.  Date of Last Pap: No result found    OB History    Para Term  AB Living   3 3 3         SAB TAB Ectopic Multiple Live Births                  # Outcome Date GA Lbr Gregorio/2nd Weight Sex Delivery Anes PTL Lv   3 Term            2 Term            1 Term                   Review of Systems  Review of Systems   Constitutional: Negative for activity change, appetite change, chills, diaphoresis, fatigue, fever and unexpected weight change.   HENT: Negative for mouth sores and tinnitus.    Eyes: Negative for discharge and visual disturbance.   Respiratory: Negative for cough, shortness of breath and wheezing.    Cardiovascular: Negative for chest pain, palpitations and leg swelling.   Gastrointestinal: Negative for abdominal pain, bloating, blood in stool, constipation, diarrhea, nausea and vomiting.   Endocrine: Negative for diabetes, hair loss, hot flashes, hyperthyroidism and hypothyroidism.   Genitourinary: Negative for decreased libido, dyspareunia, dysuria, flank pain, frequency, genital sores, hematuria, menorrhagia, menstrual problem, pelvic pain, urgency, vaginal bleeding, vaginal discharge, vaginal pain, dysmenorrhea, urinary incontinence, postcoital bleeding, postmenopausal bleeding and vaginal odor.   Musculoskeletal: Negative for back pain, joint swelling and myalgias.   Skin:  Negative for rash, no acne and hair changes.   Neurological: Negative for seizures, syncope, numbness and headaches.   Hematological: Negative for  adenopathy. Does not bruise/bleed easily.   Psychiatric/Behavioral: Negative for depression and sleep disturbance. The patient is nervous/anxious.    Breast: Negative for breast mass, breast pain, nipple discharge and skin changes          Objective:    Physical Exam:   Constitutional: She is oriented to person, place, and time. She appears well-developed and well-nourished. No distress.    HENT:   Head: Normocephalic.    Eyes: Pupils are equal, round, and reactive to light.    Neck: Normal range of motion.    Cardiovascular: Normal rate.     Pulmonary/Chest: Effort normal.        Abdominal: Soft. She exhibits no distension. There is no tenderness.     Genitourinary: Vagina normal. No vaginal discharge found.           Musculoskeletal: Normal range of motion and moves all extremeties.       Neurological: She is alert and oriented to person, place, and time.    Skin: Skin is warm and dry.      No lesions noted and normal appearance of both labia. There is atrophy of vaginal canal and adequate supports noted.     Assessment:        1. Pain in vulva    2. Encounter for gynecological examination without abnormal finding                Plan:      Patient advised of normal appearance of inner and outer lips in genital area as well as adequate vaginal supports.

## 2018-07-31 ENCOUNTER — TELEPHONE (OUTPATIENT)
Dept: FAMILY MEDICINE | Facility: CLINIC | Age: 73
End: 2018-07-31

## 2018-07-31 NOTE — TELEPHONE ENCOUNTER
----- Message from María Beverly sent at 7/31/2018 12:21 PM CDT -----  Contact: Patient   Type: Needs Medical Advice    Who Called:  Patient  Symptoms (please be specific):  Sinus infection  How long has patient had these symptoms:  1 week  Pharmacy name and phone #:    [id number] - [place] [address] [address] [facility] [address]  [facility] [place] [id number]  Phone: [phone] Fax: [phone]  Best Call Back Number: [phone]  Additional Information: Calling to request medication for her sinus infection. Please advise.

## 2018-07-31 NOTE — TELEPHONE ENCOUNTER
----- Message from Diane Connor sent at 7/31/2018  1:13 PM CDT -----  Contact: self   Patient miss call from your office please call back at [phone] (qcei)

## 2018-08-03 RX ORDER — AMLODIPINE BESYLATE 5 MG/1
TABLET ORAL
Qty: 90 TABLET | Refills: 3 | Status: SHIPPED | OUTPATIENT
Start: 2018-08-03 | End: 2019-07-23 | Stop reason: SDUPTHER

## 2018-08-19 RX ORDER — ZOLPIDEM TARTRATE 10 MG/1
TABLET ORAL
Qty: 30 TABLET | Refills: 5 | Status: SHIPPED | OUTPATIENT
Start: 2018-08-19 | End: 2019-01-22 | Stop reason: SDUPTHER

## 2018-10-01 ENCOUNTER — OFFICE VISIT (OUTPATIENT)
Dept: NEUROSURGERY | Facility: CLINIC | Age: 73
End: 2018-10-01
Payer: MEDICARE

## 2018-10-01 VITALS
DIASTOLIC BLOOD PRESSURE: 58 MMHG | HEART RATE: 55 BPM | BODY MASS INDEX: 23.17 KG/M2 | HEIGHT: 62 IN | WEIGHT: 125.88 LBS | SYSTOLIC BLOOD PRESSURE: 99 MMHG

## 2018-10-01 DIAGNOSIS — M47.816 LUMBAR SPONDYLOSIS: Primary | ICD-10-CM

## 2018-10-01 DIAGNOSIS — M54.16 LUMBAR RADICULOPATHY: ICD-10-CM

## 2018-10-01 PROCEDURE — 3078F DIAST BP <80 MM HG: CPT | Mod: CPTII,,, | Performed by: NEUROLOGICAL SURGERY

## 2018-10-01 PROCEDURE — 1101F PT FALLS ASSESS-DOCD LE1/YR: CPT | Mod: CPTII,,, | Performed by: NEUROLOGICAL SURGERY

## 2018-10-01 PROCEDURE — 99213 OFFICE O/P EST LOW 20 MIN: CPT | Mod: PBBFAC,PN | Performed by: NEUROLOGICAL SURGERY

## 2018-10-01 PROCEDURE — 3074F SYST BP LT 130 MM HG: CPT | Mod: CPTII,,, | Performed by: NEUROLOGICAL SURGERY

## 2018-10-01 PROCEDURE — 99999 PR PBB SHADOW E&M-EST. PATIENT-LVL III: CPT | Mod: PBBFAC,,, | Performed by: NEUROLOGICAL SURGERY

## 2018-10-01 PROCEDURE — 99204 OFFICE O/P NEW MOD 45 MIN: CPT | Mod: S$PBB,,, | Performed by: NEUROLOGICAL SURGERY

## 2018-10-01 NOTE — LETTER
October 1, 2018      LILLY Covarrubias  [address] [facility] [address] [place] [address]           [facility]  [address] [facility] [address] [place] [address]  Phone: [phone]  Fax: [phone]          Patient: Luis Miguel Murcia   MR Number: [id number]   YOB: 1945   Date of Visit: 10/1/2018       Dear Ovidio Gamino:    Thank you for referring Luis Miguel Murcia to me for evaluation. Attached you will find relevant portions of my assessment and plan of care.    If you have questions, please do not hesitate to call me. I look forward to following Luis Miguel Murcia along with you.    Sincerely,    Harleen Naylor MD    Enclosure  CC:  No Recipients    If you would like to receive this communication electronically, please contact [email or web address] or [phone] to request more information on [employer] access.    For providers and/or their staff who would like to refer a patient to [facility], please contact us through our one-stop-shop provider referral line, [facility], at [phone].    If you feel you have received this communication in error or would no longer like to receive these types of communications, please e-mail [email or web address]
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
## 2018-10-04 ENCOUNTER — TELEPHONE (OUTPATIENT)
Dept: NEUROSURGERY | Facility: CLINIC | Age: 73
End: 2018-10-04

## 2018-10-04 NOTE — TELEPHONE ENCOUNTER
Called pt to schedule follow up apt with Dr. Nalyor. Pt will call back once EMG is scheduled with Dr. Starkey to schedule follow up apt at our clinic.

## 2018-10-05 DIAGNOSIS — L30.9 ECZEMA, UNSPECIFIED TYPE: ICD-10-CM

## 2018-10-07 RX ORDER — TRIAMCINOLONE ACETONIDE 1 MG/G
CREAM TOPICAL
Qty: 60 G | Refills: 0 | Status: SHIPPED | OUTPATIENT
Start: 2018-10-07 | End: 2019-02-06

## 2018-10-08 ENCOUNTER — HOSPITAL ENCOUNTER (OUTPATIENT)
Dept: RADIOLOGY | Facility: HOSPITAL | Age: 73
Discharge: HOME | End: 2018-10-08
Attending: NEUROLOGICAL SURGERY
Payer: MEDICARE

## 2018-10-08 ENCOUNTER — PATIENT OUTREACH (OUTPATIENT)
Dept: ADMINISTRATIVE | Facility: HOSPITAL | Age: 73
End: 2018-10-08

## 2018-10-08 DIAGNOSIS — M54.16 LUMBAR RADICULOPATHY: ICD-10-CM

## 2018-10-08 DIAGNOSIS — Z12.39 BREAST CANCER SCREENING: Primary | ICD-10-CM

## 2018-10-08 DIAGNOSIS — M47.816 LUMBAR SPONDYLOSIS: ICD-10-CM

## 2018-10-08 PROCEDURE — 72110 X-RAY EXAM L-2 SPINE 4/>VWS: CPT | Mod: 26,,, | Performed by: RADIOLOGY

## 2018-10-08 PROCEDURE — 72110 X-RAY EXAM L-2 SPINE 4/>VWS: CPT | Mod: TC,FY,PO

## 2018-10-08 NOTE — PROGRESS NOTES
Health Maintenance Due   Topic Date Due    Complete Opioid Risk Tool  04/10/1963    Influenza Vaccine  08/01/2018    Mammogram  09/28/2018     Pre-visit outreach via mail

## 2018-10-08 NOTE — LETTER
October 8, 2018    Luis Miguel Murcia  [address] [address]  [place] [address]             [facility]  [address]  [place] [place] [facility] [address]  Phone: [phone] Dear Ms. Murcia:    [facility] is committed to your overall health.  To help you get the most out of each of your visits, we will review your information to make sure you are up to date on all of your recommended tests and/or procedures.      Juan Francisco Jeong MD    has found that your chart shows you may be due for the following:    Influenza Vaccine  Mammogram    If you have had any of the above done at another facility, please bring the records or information with you so that your record at [facility] will be complete.  If you would like to schedule any of these, please contact me.    If you are currently taking medication, please bring it with you to your appointment for review.    If you have any questions or concerns, please don't hesitate to call.    Sincerely,    Debbie Carter  Clinical Care Coordinator  [facility]  [address] [place] [address]  Phone: [phone]   Fax: [phone]

## 2018-10-22 ENCOUNTER — OFFICE VISIT (OUTPATIENT)
Dept: FAMILY MEDICINE | Facility: CLINIC | Age: 73
End: 2018-10-22
Payer: MEDICARE

## 2018-10-22 VITALS
TEMPERATURE: 98 F | DIASTOLIC BLOOD PRESSURE: 70 MMHG | OXYGEN SATURATION: 97 % | SYSTOLIC BLOOD PRESSURE: 110 MMHG | BODY MASS INDEX: 22.63 KG/M2 | HEART RATE: 57 BPM | HEIGHT: 62 IN | WEIGHT: 123 LBS

## 2018-10-22 DIAGNOSIS — E78.5 HYPERLIPIDEMIA, UNSPECIFIED HYPERLIPIDEMIA TYPE: ICD-10-CM

## 2018-10-22 DIAGNOSIS — I10 ESSENTIAL HYPERTENSION: Primary | ICD-10-CM

## 2018-10-22 DIAGNOSIS — M54.16 LUMBAR RADICULOPATHY: ICD-10-CM

## 2018-10-22 DIAGNOSIS — Z12.39 BREAST CANCER SCREENING: ICD-10-CM

## 2018-10-22 PROCEDURE — 99213 OFFICE O/P EST LOW 20 MIN: CPT | Mod: PBBFAC,PO | Performed by: FAMILY MEDICINE

## 2018-10-22 PROCEDURE — 3074F SYST BP LT 130 MM HG: CPT | Mod: CPTII,,, | Performed by: FAMILY MEDICINE

## 2018-10-22 PROCEDURE — 99214 OFFICE O/P EST MOD 30 MIN: CPT | Mod: S$PBB,,, | Performed by: FAMILY MEDICINE

## 2018-10-22 PROCEDURE — 3078F DIAST BP <80 MM HG: CPT | Mod: CPTII,,, | Performed by: FAMILY MEDICINE

## 2018-10-22 PROCEDURE — 1101F PT FALLS ASSESS-DOCD LE1/YR: CPT | Mod: CPTII,,, | Performed by: FAMILY MEDICINE

## 2018-10-22 PROCEDURE — 99999 PR PBB SHADOW E&M-EST. PATIENT-LVL III: CPT | Mod: PBBFAC,,, | Performed by: FAMILY MEDICINE

## 2018-10-22 RX ORDER — HYDROCODONE BITARTRATE AND ACETAMINOPHEN 5; 325 MG/1; MG/1
1 TABLET ORAL 2 TIMES DAILY PRN
Qty: 60 TABLET | Refills: 0 | Status: SHIPPED | OUTPATIENT
Start: 2019-01-06 | End: 2019-01-22 | Stop reason: SDUPTHER

## 2018-10-22 RX ORDER — HYDROCODONE BITARTRATE AND ACETAMINOPHEN 5; 325 MG/1; MG/1
1 TABLET ORAL 2 TIMES DAILY PRN
Qty: 60 TABLET | Refills: 0 | Status: SHIPPED | OUTPATIENT
Start: 2018-12-07 | End: 2019-01-06

## 2018-10-22 RX ORDER — HYDROCODONE BITARTRATE AND ACETAMINOPHEN 5; 325 MG/1; MG/1
1 TABLET ORAL 2 TIMES DAILY PRN
Qty: 60 TABLET | Refills: 0 | Status: SHIPPED | OUTPATIENT
Start: 2018-11-07 | End: 2018-12-07

## 2018-10-22 NOTE — PROGRESS NOTES
Subjective:       Patient ID: Luis Miguel Murcia is a 73 y.o. female.    Chief Complaint: Follow-up and Labs Only (results )    HPI     Here for a f/u.    Reviewed recent results. All wnl.      htn is stable. No c/o cp or sob at rest or exertion. No headaches.      C/o chronic right lumbar radicular pain > 1 year. Sees pain management. Ps: 2-3/10 while on norco. Reports mild relief from poppy L5-S1 in 7/5/2018. Has residual constant neuropathy from right calf to right foot.     Saw neurosurgery earlier this month and planning to get an emg.       Also, has chronic neck pain > 1 year. Sees pain management. Ps: 2-3/10 while on norco       Imaging:  C-spine MRI 9/30/10: There is anterolisthesis of C3 on 4 and 4 on 5 mile, retrolisthesis of C5 and 6. At C3/4 there is a mild bulge with foraminal narrowing bilaterally mild. At C5-C6 there is a disc bulge with right upper protrusion of the disc but severe foraminal narrowing on the right and moderate to severe on the left. There is disc bulging at C6/7 with a bulge more to the right.     MRI L-spine 5/24/2018: There is multilevel degenerative change discussed in detail above.  There is no fracture.  There is trace anterolisthesis of L4 on L5 which is degenerative in etiology and unchanged.  All of the discs are desiccated.  There is some degree of disc space narrowing, disc bulge with a without disc protrusions, facet joint arthropathy contributing to stenosis.  These findings will be summarized below. There is degenerative change but there is no spinal canal or significant foraminal stenosis at the T10-11, T11-12, T12-L1 or L1-2 levels. At the L2-3 level, there is mild spinal canal and left foraminal stenosis.  There is facet joint arthropathy.  There is a disc bulge with small left paracentral disc protrusion.  The disc protrusion has occurred since the prior study in the facet joint changes have progressed. At the L3-4 level, there is mild spinal stenosis with  moderate left lateral recess stenosis.  Additionally, there is mild-to-moderate left and mild right foraminal stenosis without definite change. At the L4-5 level, there is multifactorial moderate central spinal stenosis without any significant foraminal stenosis and without definite change. At the L5-S1 level, there is right greater than left facet joint arthropathy.  There is a disc bulge with osteophytic ridging and broad central disc protrusion but there is no significant spinal stenosis.  There is stable mild left and moderate-to-marked right foraminal stenosis.Anxiety stable while taking xanax.     Copd stable. No worsening cough or wheeze.     Insomnia stable while on ambien.          Review of Systems      Review of Systems   Constitutional: Negative for fever and chills.   HENT: Negative for hearing loss and neck stiffness.    Eyes: Negative for redness and itching.   Respiratory: Negative for cough and choking.    Cardiovascular: Negative for chest pain and leg swelling.  Abdomen: Negative for abdominal pain and blood in stool.   Genitourinary: Negative for dysuria and flank pain.   Neurological: Negative for light-headedness and headaches.   Hematological: Negative for adenopathy.   Psychiatric/Behavioral: Negative for behavioral problems.     Objective:      Physical Exam   Constitutional: She appears well-developed.   HENT:   Head: Normocephalic and atraumatic.   Eyes: Conjunctivae are normal. Pupils are equal, round, and reactive to light.   Neck: Normal range of motion.   Cardiovascular: Normal rate and regular rhythm.   No murmur heard.  Pulmonary/Chest: Effort normal and breath sounds normal.   Lymphadenopathy:     She has no cervical adenopathy.       Assessment:       1. Essential hypertension    2. Breast cancer screening    3. Hyperlipidemia, unspecified hyperlipidemia type    4. Lumbar radiculopathy        Plan:       Essential hypertension    Breast cancer screening  -     Mammo Digital  Screening Bilat; Future; Expected date: 10/22/2018    Hyperlipidemia, unspecified hyperlipidemia type    Lumbar radiculopathy    Other orders  -     HYDROcodone-acetaminophen (NORCO) 5-325 mg per tablet; Take 1 tablet by mouth 2 (two) times daily as needed for Pain.  Dispense: 60 tablet; Refill: 0  -     HYDROcodone-acetaminophen (NORCO) 5-325 mg per tablet; Take 1 tablet by mouth 2 (two) times daily as needed for Pain.  Dispense: 60 tablet; Refill: 0  -     HYDROcodone-acetaminophen (NORCO) 5-325 mg per tablet; Take 1 tablet by mouth 2 (two) times daily as needed for Pain.  Dispense: 60 tablet; Refill: 0          Plan:  See order  Cont current meds         Medication List           Accurate as of 10/22/18  3:05 PM. If you have any questions, ask your nurse or doctor.               CHANGE how you take these medications    * HYDROcodone-acetaminophen 5-325 mg per tablet  Commonly known as:  NORCO  Take 1 tablet by mouth 2 (two) times daily as needed for Pain.  Start taking on:  11/7/2018  What changed:  These instructions start on 11/7/2018. If you are unsure what to do until then, ask your doctor or other care provider.  Changed by:  Juan Francisco Jeong MD     * HYDROcodone-acetaminophen 5-325 mg per tablet  Commonly known as:  NORCO  Take 1 tablet by mouth 2 (two) times daily as needed for Pain.  Start taking on:  12/7/2018  What changed:  You were already taking a medication with the same name, and this prescription was added. Make sure you understand how and when to take each.  Changed by:  Juan Francisco Jeong MD     * HYDROcodone-acetaminophen 5-325 mg per tablet  Commonly known as:  NORCO  Take 1 tablet by mouth 2 (two) times daily as needed for Pain.  Start taking on:  1/6/2019  What changed:  You were already taking a medication with the same name, and this prescription was added. Make sure you understand how and when to take each.  Changed by:  Juan Francisco Jeong MD         * This list has 3 medication(s) that  are the same as other medications prescribed for you. Read the directions carefully, and ask your doctor or other care provider to review them with you.            CONTINUE taking these medications    ALPRAZolam 0.25 MG tablet  Commonly known as:  XANAX  TAKE 1 TABLET(0.25 MG) BY MOUTH TWICE DAILY AS NEEDED FOR ANXIETY     amLODIPine 5 MG tablet  Commonly known as:  NORVASC  TAKE 1 TABLET(5 MG) BY MOUTH EVERY DAY     aspirin 81 MG EC tablet  Commonly known as:  ECOTRIN     azelastine 137 mcg (0.1 %) nasal spray  Commonly known as:  ASTELIN  USE 2 SPRAYS IN EACH NOSTRIL EVERY DAY. MAY USE UP TO 2 TIMES DAILY DEPENDING ON SYMPTOMS     b complex vitamins tablet     CALCIUM CITRATE-VITAMIN D3 ORAL     carvedilol 12.5 MG tablet  Commonly known as:  COREG  TAKE 1 TABLET(12.5 MG) BY MOUTH TWICE DAILY WITH MEALS     co-enzyme Q-10 30 mg capsule     DEXILANT 60 mg capsule  Generic drug:  dexlansoprazole  TAKE 1 CAPSULE(60 MG) BY MOUTH EVERY MORNING BEFORE BREAKFAST     fluticasone 50 mcg/actuation nasal spray  Commonly known as:  FLONASE  2 sprays (100 mcg total) by Each Nare route once daily.     gabapentin 300 MG capsule  Commonly known as:  NEURONTIN  Take 1 capsule (300 mg total) by mouth every evening.     * pravastatin 20 MG tablet  Commonly known as:  PRAVACHOL     * pravastatin 20 MG tablet  Commonly known as:  PRAVACHOL  TAKE 1 TABLET(20 MG) BY MOUTH EVERY NIGHT     ranitidine 150 MG tablet  Commonly known as:  ZANTAC  TAKE 1 TABLET(150 MG) BY MOUTH EVERY NIGHT     triamcinolone acetonide 0.1% 0.1 % cream  Commonly known as:  KENALOG  APPLY EXTERNALLY TO THE AFFECTED AREA TWICE DAILY AS NEEDED FOR RASH     VENTOLIN HFA 90 mcg/actuation inhaler  Generic drug:  albuterol  INHALE 2 PUFFS INTO THE LUNGS EVERY 6 HOURS AS NEEDED FOR WHEEZING     VITAMIN B-12 1000 MCG tablet  Generic drug:  cyanocobalamin     VITAMIN C ORAL     zolpidem 10 mg Tab  Commonly known as:  AMBIEN  TAKE 1 TABLET BY MOUTH EVERY NIGHT AT BEDTIME          * This list has 2 medication(s) that are the same as other medications prescribed for you. Read the directions carefully, and ask your doctor or other care provider to review them with you.            STOP taking these medications    FISH OIL ORAL  Stopped by:  Juan Francisco Jeong MD           Where to Get Your Medications      These medications were sent to [facility] [address] [address] AT [address] [address]    Hours:  24-hours Phone:  [phone]   · HYDROcodone-acetaminophen 5-325 mg per tablet  · HYDROcodone-acetaminophen 5-325 mg per tablet  · HYDROcodone-acetaminophen 5-325 mg per tablet

## 2018-11-26 ENCOUNTER — TELEPHONE (OUTPATIENT)
Dept: FAMILY MEDICINE | Facility: CLINIC | Age: 73
End: 2018-11-26

## 2018-11-26 NOTE — TELEPHONE ENCOUNTER
----- Message from Cristina Crain sent at 11/26/2018  2:37 PM CST -----  Type: Needs Medical Advice    Who Called:  Patient  Best Call Back Number: [phone]  Additional Information: Patient has two mammogram orders put in for her but does not know which one is correct. She does have implants. Please call back to advise.

## 2018-12-03 ENCOUNTER — TELEPHONE (OUTPATIENT)
Dept: NEUROSURGERY | Facility: CLINIC | Age: 73
End: 2018-12-03
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
## 2018-12-04 ENCOUNTER — OFFICE VISIT (OUTPATIENT)
Dept: NEUROSURGERY | Facility: CLINIC | Age: 73
End: 2018-12-04
Payer: MEDICARE

## 2018-12-04 VITALS
TEMPERATURE: 98 F | HEIGHT: 62 IN | HEART RATE: 53 BPM | WEIGHT: 123.81 LBS | BODY MASS INDEX: 22.78 KG/M2 | SYSTOLIC BLOOD PRESSURE: 110 MMHG | DIASTOLIC BLOOD PRESSURE: 60 MMHG

## 2018-12-04 DIAGNOSIS — M54.16 LUMBAR RADICULOPATHY: Primary | ICD-10-CM

## 2018-12-04 PROCEDURE — 3078F DIAST BP <80 MM HG: CPT | Mod: CPTII,HCWC,S$GLB, | Performed by: NEUROLOGICAL SURGERY

## 2018-12-04 PROCEDURE — 99214 OFFICE O/P EST MOD 30 MIN: CPT | Mod: HCWC,S$GLB,, | Performed by: NEUROLOGICAL SURGERY

## 2018-12-04 PROCEDURE — 99999 PR PBB SHADOW E&M-EST. PATIENT-LVL III: CPT | Mod: PBBFAC,HCWC,, | Performed by: NEUROLOGICAL SURGERY

## 2018-12-04 PROCEDURE — 3074F SYST BP LT 130 MM HG: CPT | Mod: CPTII,HCWC,S$GLB, | Performed by: NEUROLOGICAL SURGERY

## 2018-12-04 PROCEDURE — 1101F PT FALLS ASSESS-DOCD LE1/YR: CPT | Mod: CPTII,HCWC,S$GLB, | Performed by: NEUROLOGICAL SURGERY
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
## 2018-12-13 ENCOUNTER — HOSPITAL ENCOUNTER (OUTPATIENT)
Dept: RADIOLOGY | Facility: HOSPITAL | Age: 73
Discharge: HOME | End: 2018-12-13
Attending: FAMILY MEDICINE
Payer: MEDICARE

## 2018-12-13 DIAGNOSIS — Z12.39 BREAST CANCER SCREENING, HIGH RISK PATIENT: ICD-10-CM

## 2018-12-13 PROCEDURE — 77063 BREAST TOMOSYNTHESIS BI: CPT | Mod: TC,HCWC,PO

## 2018-12-13 PROCEDURE — 77063 BREAST TOMOSYNTHESIS BI: CPT | Mod: 26,,, | Performed by: RADIOLOGY

## 2018-12-13 PROCEDURE — 77067 SCR MAMMO BI INCL CAD: CPT | Mod: TC,HCWC,PO

## 2018-12-13 PROCEDURE — 77067 SCR MAMMO BI INCL CAD: CPT | Mod: 26,,, | Performed by: RADIOLOGY

## 2019-01-05 DIAGNOSIS — F41.9 ANXIETY: Primary | ICD-10-CM

## 2019-01-07 RX ORDER — PRAVASTATIN SODIUM 20 MG/1
TABLET ORAL
Qty: 90 TABLET | Refills: 0 | OUTPATIENT
Start: 2019-01-07

## 2019-01-09 RX ORDER — ALPRAZOLAM 0.25 MG/1
TABLET ORAL
Qty: 60 TABLET | Refills: 2 | Status: SHIPPED | OUTPATIENT
Start: 2019-01-09 | End: 2019-03-31 | Stop reason: SDUPTHER

## 2019-01-11 ENCOUNTER — PES CALL (OUTPATIENT)
Dept: ADMINISTRATIVE | Facility: CLINIC | Age: 74
End: 2019-01-11

## 2019-01-21 RX ORDER — GABAPENTIN 300 MG/1
CAPSULE ORAL
Qty: 90 CAPSULE | Refills: 0 | Status: SHIPPED | OUTPATIENT
Start: 2019-01-21 | End: 2019-01-22

## 2019-01-22 ENCOUNTER — OFFICE VISIT (OUTPATIENT)
Dept: FAMILY MEDICINE | Facility: CLINIC | Age: 74
End: 2019-01-22
Payer: MEDICARE

## 2019-01-22 VITALS
WEIGHT: 123.69 LBS | SYSTOLIC BLOOD PRESSURE: 110 MMHG | HEART RATE: 52 BPM | BODY MASS INDEX: 22.76 KG/M2 | DIASTOLIC BLOOD PRESSURE: 68 MMHG | HEIGHT: 62 IN | OXYGEN SATURATION: 99 %

## 2019-01-22 DIAGNOSIS — M47.812 CERVICAL SPONDYLOSIS WITHOUT MYELOPATHY: ICD-10-CM

## 2019-01-22 DIAGNOSIS — I10 ESSENTIAL HYPERTENSION: Primary | ICD-10-CM

## 2019-01-22 DIAGNOSIS — J44.9 CHRONIC OBSTRUCTIVE PULMONARY DISEASE, UNSPECIFIED COPD TYPE: ICD-10-CM

## 2019-01-22 DIAGNOSIS — F41.9 ANXIETY: ICD-10-CM

## 2019-01-22 DIAGNOSIS — M54.16 LUMBAR RADICULOPATHY: ICD-10-CM

## 2019-01-22 DIAGNOSIS — G47.00 INSOMNIA, UNSPECIFIED TYPE: ICD-10-CM

## 2019-01-22 PROCEDURE — 99499 RISK ADDL DX/OHS AUDIT: ICD-10-PCS | Mod: S$GLB,,, | Performed by: FAMILY MEDICINE

## 2019-01-22 PROCEDURE — 1101F PT FALLS ASSESS-DOCD LE1/YR: CPT | Mod: CPTII,S$GLB,, | Performed by: FAMILY MEDICINE

## 2019-01-22 PROCEDURE — 1101F PR PT FALLS ASSESS DOC 0-1 FALLS W/OUT INJ PAST YR: ICD-10-PCS | Mod: CPTII,S$GLB,, | Performed by: FAMILY MEDICINE

## 2019-01-22 PROCEDURE — 3074F PR MOST RECENT SYSTOLIC BLOOD PRESSURE < 130 MM HG: ICD-10-PCS | Mod: CPTII,S$GLB,, | Performed by: FAMILY MEDICINE

## 2019-01-22 PROCEDURE — 99214 OFFICE O/P EST MOD 30 MIN: CPT | Mod: S$GLB,,, | Performed by: FAMILY MEDICINE

## 2019-01-22 PROCEDURE — 3078F PR MOST RECENT DIASTOLIC BLOOD PRESSURE < 80 MM HG: ICD-10-PCS | Mod: CPTII,S$GLB,, | Performed by: FAMILY MEDICINE

## 2019-01-22 PROCEDURE — 3078F DIAST BP <80 MM HG: CPT | Mod: CPTII,S$GLB,, | Performed by: FAMILY MEDICINE

## 2019-01-22 PROCEDURE — 99499 UNLISTED E&M SERVICE: CPT | Mod: S$GLB,,, | Performed by: FAMILY MEDICINE

## 2019-01-22 PROCEDURE — 3074F SYST BP LT 130 MM HG: CPT | Mod: CPTII,S$GLB,, | Performed by: FAMILY MEDICINE

## 2019-01-22 PROCEDURE — 99999 PR PBB SHADOW E&M-EST. PATIENT-LVL III: CPT | Mod: PBBFAC,,, | Performed by: FAMILY MEDICINE

## 2019-01-22 PROCEDURE — 99214 PR OFFICE/OUTPT VISIT, EST, LEVL IV, 30-39 MIN: ICD-10-PCS | Mod: S$GLB,,, | Performed by: FAMILY MEDICINE

## 2019-01-22 PROCEDURE — 99999 PR PBB SHADOW E&M-EST. PATIENT-LVL III: ICD-10-PCS | Mod: PBBFAC,,, | Performed by: FAMILY MEDICINE

## 2019-01-22 RX ORDER — GABAPENTIN 600 MG/1
600 TABLET ORAL NIGHTLY
Qty: 30 TABLET | Refills: 11 | Status: SHIPPED | OUTPATIENT
Start: 2019-01-22 | End: 2020-01-22

## 2019-01-22 RX ORDER — HYDROCODONE BITARTRATE AND ACETAMINOPHEN 5; 325 MG/1; MG/1
1 TABLET ORAL 2 TIMES DAILY PRN
Qty: 60 TABLET | Refills: 0 | Status: SHIPPED | OUTPATIENT
Start: 2019-03-12 | End: 2019-04-11

## 2019-01-22 RX ORDER — HYDROCODONE BITARTRATE AND ACETAMINOPHEN 5; 325 MG/1; MG/1
1 TABLET ORAL 2 TIMES DAILY PRN
Qty: 60 TABLET | Refills: 0 | Status: SHIPPED | OUTPATIENT
Start: 2019-04-11 | End: 2019-04-22 | Stop reason: SDUPTHER

## 2019-01-22 RX ORDER — HYDROCODONE BITARTRATE AND ACETAMINOPHEN 5; 325 MG/1; MG/1
1 TABLET ORAL 2 TIMES DAILY PRN
Qty: 60 TABLET | Refills: 0 | Status: SHIPPED | OUTPATIENT
Start: 2019-02-10 | End: 2019-03-12

## 2019-01-22 RX ORDER — ZOLPIDEM TARTRATE 10 MG/1
10 TABLET ORAL NIGHTLY
Qty: 30 TABLET | Refills: 5 | Status: SHIPPED | OUTPATIENT
Start: 2019-01-22 | End: 2019-07-25 | Stop reason: SDUPTHER

## 2019-01-22 NOTE — PROGRESS NOTES
Subjective:       Patient ID: Luis Miguel Murcia is a 73 y.o. female.    Chief Complaint: Hypertension    HPI     Here for a f/u.     htn is stable. No c/o cp or sob at rest or exertion. No headaches.      C/o chronic right lumbar radicular pain > 1 year. Ps: 2-3/10 while on norco. Reports mild relief from poppy L5-S1 in 7/5/2018. Has residual constant neuropathy from right calf to right foot which is worse at night.  Takes gabapentin 300 mg qhs for the neuropathy.      Also, has chronic neck pain > 1 year. Ps: 2-3/10 while on norco       Imaging:  C-spine MRI 9/30/10: There is anterolisthesis of C3 on 4 and 4 on 5 mile, retrolisthesis of C5 and 6. At C3/4 there is a mild bulge with foraminal narrowing bilaterally mild. At C5-C6 there is a disc bulge with right upper protrusion of the disc but severe foraminal narrowing on the right and moderate to severe on the left. There is disc bulging at C6/7 with a bulge more to the right.     MRI L-spine 5/24/2018: There is multilevel degenerative change discussed in detail above.  There is no fracture.  There is trace anterolisthesis of L4 on L5 which is degenerative in etiology and unchanged.  All of the discs are desiccated.  There is some degree of disc space narrowing, disc bulge with a without disc protrusions, facet joint arthropathy contributing to stenosis.  These findings will be summarized below. There is degenerative change but there is no spinal canal or significant foraminal stenosis at the T10-11, T11-12, T12-L1 or L1-2 levels. At the L2-3 level, there is mild spinal canal and left foraminal stenosis.  There is facet joint arthropathy.  There is a disc bulge with small left paracentral disc protrusion.  The disc protrusion has occurred since the prior study in the facet joint changes have progressed. At the L3-4 level, there is mild spinal stenosis with moderate left lateral recess stenosis.  Additionally, there is mild-to-moderate left and mild right  foraminal stenosis without definite change. At the L4-5 level, there is multifactorial moderate central spinal stenosis without any significant foraminal stenosis and without definite change. At the L5-S1 level, there is right greater than left facet joint arthropathy.  There is a disc bulge with osteophytic ridging and broad central disc protrusion but there is no significant spinal stenosis.  There is stable mild left and moderate-to-marked right foraminal stenosis.Anxiety stable while taking xanax.     Copd stable. No worsening cough or wheeze.     Insomnia stable while on ambien.              Review of Systems      Review of Systems   Constitutional: Negative for fever and chills.   HENT: Negative for hearing loss and neck stiffness.    Eyes: Negative for redness and itching.   Respiratory: Negative for cough and choking.    Cardiovascular: Negative for chest pain and leg swelling.  Abdomen: Negative for abdominal pain and blood in stool.   Genitourinary: Negative for dysuria and flank pain.   Musculoskeletal: Negative for gait problem.   Neurological: Negative for light-headedness and headaches.   Hematological: Negative for adenopathy.   Psychiatric/Behavioral: Negative for behavioral problems.       Objective:      Physical Exam   Constitutional: She appears well-developed.   HENT:   Head: Normocephalic and atraumatic.   Eyes: Conjunctivae are normal. Pupils are equal, round, and reactive to light.   Neck: Normal range of motion.   Cardiovascular: Normal rate and regular rhythm.   No murmur heard.  Pulmonary/Chest: Effort normal and breath sounds normal.   Musculoskeletal:   lumbar spine: pos tenderness of bilat paravert area.  Right slr to 50 degrees reproduces right sciatic pain.  Left slr to 80 degrees reproduces bilat lower pain.     Cervical spine: tend of bilat paravert area.  Dec rom with flex/ext.    Lymphadenopathy:     She has no cervical adenopathy.       Assessment:       1. Essential hypertension     2. Lumbar radiculopathy    3. Anxiety    4. Insomnia, unspecified type    5. Chronic obstructive pulmonary disease, unspecified COPD type    6. Cervical spondylosis without myelopathy        Plan:       Essential hypertension    Lumbar radiculopathy    Anxiety    Insomnia, unspecified type    Chronic obstructive pulmonary disease, unspecified COPD type    Cervical spondylosis without myelopathy    Other orders  -     zolpidem (AMBIEN) 10 mg Tab; Take 1 tablet (10 mg total) by mouth nightly.  Dispense: 30 tablet; Refill: 5  -     HYDROcodone-acetaminophen (NORCO) 5-325 mg per tablet; Take 1 tablet by mouth 2 (two) times daily as needed for Pain.  Dispense: 60 tablet; Refill: 0  -     HYDROcodone-acetaminophen (NORCO) 5-325 mg per tablet; Take 1 tablet by mouth 2 (two) times daily as needed for Pain.  Dispense: 60 tablet; Refill: 0  -     HYDROcodone-acetaminophen (NORCO) 5-325 mg per tablet; Take 1 tablet by mouth 2 (two) times daily as needed for Pain.  Dispense: 60 tablet; Refill: 0  -     gabapentin (NEURONTIN) 600 MG tablet; Take 1 tablet (600 mg total) by mouth every evening.  Dispense: 30 tablet; Refill: 11          Plan:  Inc neurontin to 600 mg po qhs  rf norco 5  rf ambien  Cont all other meds           Medication List           Accurate as of 1/22/19  1:42 PM. If you have any questions, ask your nurse or doctor.               START taking these medications    gabapentin 600 MG tablet  Commonly known as:  NEURONTIN  Take 1 tablet (600 mg total) by mouth every evening.  Replaces:  gabapentin 300 MG capsule  Started by:  Juan Francisco Jeong MD        CHANGE how you take these medications    * HYDROcodone-acetaminophen 5-325 mg per tablet  Commonly known as:  NORCO  Take 1 tablet by mouth 2 (two) times daily as needed for Pain.  Start taking on:  2/10/2019  What changed:  These instructions start on 2/10/2019. If you are unsure what to do until then, ask your doctor or other care provider.  Changed by:  Juan Francisco  MEHREEN Jeong MD     * HYDROcodone-acetaminophen 5-325 mg per tablet  Commonly known as:  NORCO  Take 1 tablet by mouth 2 (two) times daily as needed for Pain.  Start taking on:  3/12/2019  What changed:  You were already taking a medication with the same name, and this prescription was added. Make sure you understand how and when to take each.  Changed by:  Juan Francisco Jeong MD     * HYDROcodone-acetaminophen 5-325 mg per tablet  Commonly known as:  NORCO  Take 1 tablet by mouth 2 (two) times daily as needed for Pain.  Start taking on:  4/11/2019  What changed:  You were already taking a medication with the same name, and this prescription was added. Make sure you understand how and when to take each.  Changed by:  Juan Francisco Jeong MD         * This list has 3 medication(s) that are the same as other medications prescribed for you. Read the directions carefully, and ask your doctor or other care provider to review them with you.            CONTINUE taking these medications    ALPRAZolam 0.25 MG tablet  Commonly known as:  XANAX  TAKE 1 TABLET BY MOUTH TWICE DAILY AS NEEDED FOR ANXIETY     amLODIPine 5 MG tablet  Commonly known as:  NORVASC  TAKE 1 TABLET(5 MG) BY MOUTH EVERY DAY     aspirin 81 MG EC tablet  Commonly known as:  ECOTRIN     azelastine 137 mcg (0.1 %) nasal spray  Commonly known as:  ASTELIN  USE 2 SPRAYS IN EACH NOSTRIL EVERY DAY. MAY USE UP TO 2 TIMES DAILY DEPENDING ON SYMPTOMS     b complex vitamins tablet     CALCIUM CITRATE-VITAMIN D3 ORAL     carvedilol 12.5 MG tablet  Commonly known as:  COREG  TAKE 1 TABLET(12.5 MG) BY MOUTH TWICE DAILY WITH MEALS     co-enzyme Q-10 30 mg capsule     DEXILANT 60 mg capsule  Generic drug:  dexlansoprazole  TAKE 1 CAPSULE(60 MG) BY MOUTH EVERY MORNING BEFORE BREAKFAST     fluticasone 50 mcg/actuation nasal spray  Commonly known as:  FLONASE  2 sprays (100 mcg total) by Each Nare route once daily.     pravastatin 20 MG tablet  Commonly known as:  PRAVACHOL  TAKE 1  TABLET(20 MG) BY MOUTH EVERY NIGHT     ranitidine 150 MG tablet  Commonly known as:  ZANTAC  TAKE 1 TABLET(150 MG) BY MOUTH EVERY NIGHT     triamcinolone acetonide 0.1% 0.1 % cream  Commonly known as:  KENALOG  APPLY EXTERNALLY TO THE AFFECTED AREA TWICE DAILY AS NEEDED FOR RASH     VENTOLIN HFA 90 mcg/actuation inhaler  Generic drug:  albuterol  INHALE 2 PUFFS INTO THE LUNGS EVERY 6 HOURS AS NEEDED FOR WHEEZING     VITAMIN B-12 1000 MCG tablet  Generic drug:  cyanocobalamin     VITAMIN C ORAL     zolpidem 10 mg Tab  Commonly known as:  AMBIEN  Take 1 tablet (10 mg total) by mouth nightly.        STOP taking these medications    gabapentin 300 MG capsule  Commonly known as:  NEURONTIN  Replaced by:  gabapentin 600 MG tablet  Stopped by:  Juan Francisco Jeong MD           Where to Get Your Medications      [facility] [address] [place] [address] & [address] [address]    Hours:  24-hours Phone:  [phone]   · gabapentin 600 MG tablet  · HYDROcodone-acetaminophen 5-325 mg per tablet  · HYDROcodone-acetaminophen 5-325 mg per tablet  · HYDROcodone-acetaminophen 5-325 mg per tablet  · zolpidem 10 mg Tab

## 2019-01-31 ENCOUNTER — TELEPHONE (OUTPATIENT)
Dept: OTOLARYNGOLOGY | Facility: CLINIC | Age: 74
End: 2019-01-31

## 2019-01-31 NOTE — TELEPHONE ENCOUNTER
----- Message from Lolakylee Mcfaddendileep sent at 1/31/2019  3:03 PM CST -----  Contact: Self  Type:  Sooner Apoointment Request    Caller is requesting a sooner appointment.  Caller declined first available appointment listed below.  Caller will not accept being placed on the waitlist and is requesting a message be sent to doctor.    Name of Caller: patient  When is the first available appointment?  You schedule  Symptoms:  Mucus stuck in back of throat  Best Call Back Number:  [phone] (home)   Additional Information: na

## 2019-02-06 ENCOUNTER — OFFICE VISIT (OUTPATIENT)
Dept: OTOLARYNGOLOGY | Facility: CLINIC | Age: 74
End: 2019-02-06
Payer: MEDICARE

## 2019-02-06 VITALS — BODY MASS INDEX: 22.48 KG/M2 | WEIGHT: 122.13 LBS | HEIGHT: 62 IN

## 2019-02-06 DIAGNOSIS — J00 NASOPHARYNGITIS INFECTIVE: Primary | ICD-10-CM

## 2019-02-06 PROCEDURE — 99999 PR PBB SHADOW E&M-EST. PATIENT-LVL V: CPT | Mod: PBBFAC,,, | Performed by: NURSE PRACTITIONER

## 2019-02-06 PROCEDURE — 1101F PT FALLS ASSESS-DOCD LE1/YR: CPT | Mod: CPTII,S$GLB,, | Performed by: NURSE PRACTITIONER

## 2019-02-06 PROCEDURE — 87077 CULTURE AEROBIC IDENTIFY: CPT | Mod: 59

## 2019-02-06 PROCEDURE — 87070 CULTURE OTHR SPECIMN AEROBIC: CPT

## 2019-02-06 PROCEDURE — 92511 PR NASOPHARYNGOSCOPY: ICD-10-PCS | Mod: S$GLB,,, | Performed by: NURSE PRACTITIONER

## 2019-02-06 PROCEDURE — 99999 PR PBB SHADOW E&M-EST. PATIENT-LVL V: ICD-10-PCS | Mod: PBBFAC,,, | Performed by: NURSE PRACTITIONER

## 2019-02-06 PROCEDURE — 1101F PR PT FALLS ASSESS DOC 0-1 FALLS W/OUT INJ PAST YR: ICD-10-PCS | Mod: CPTII,S$GLB,, | Performed by: NURSE PRACTITIONER

## 2019-02-06 PROCEDURE — 99214 OFFICE O/P EST MOD 30 MIN: CPT | Mod: 25,S$GLB,, | Performed by: NURSE PRACTITIONER

## 2019-02-06 PROCEDURE — 92511 NASOPHARYNGOSCOPY: CPT | Mod: S$GLB,,, | Performed by: NURSE PRACTITIONER

## 2019-02-06 PROCEDURE — 87186 SC STD MICRODIL/AGAR DIL: CPT | Mod: 59

## 2019-02-06 PROCEDURE — 99214 PR OFFICE/OUTPT VISIT, EST, LEVL IV, 30-39 MIN: ICD-10-PCS | Mod: 25,S$GLB,, | Performed by: NURSE PRACTITIONER

## 2019-02-06 RX ORDER — DOXYCYCLINE 100 MG/1
100 CAPSULE ORAL EVERY 12 HOURS
Qty: 28 CAPSULE | Refills: 0 | Status: SHIPPED | OUTPATIENT
Start: 2019-02-06 | End: 2019-02-20

## 2019-02-06 NOTE — PATIENT INSTRUCTIONS
You were seen in 2012 and 2014 for GERD-related throat concerns.   You were seen in 2016 for sinus concerns, at which time your sinus films were all clear (no evidence of sinusitis).     Different reasons for recurrent sore throat:     1. Nasal allergies -- Typical constellation of symptoms seen with nasal allergies: itchy, red, watery eyes; itchy, red, watery nose; excessive sneezing; excessive stuffiness. Discuss with your primary care provider whether you should see an allergist or take daily allergy medications.     2. Silent reflux -- Typical constellation of symptoms seen with silent reflux: post-nasal drip sensation with absence of significant runny nose or nasal congestion, sensation of thick or too much mucus in the back of throat, raspy voice, frequent throat clearing, lump in the back of throat, frequent sore throats. Discuss with your primary care provider whether you should see a gastroenterologist or take daily reflux medications.     3. Sinus Infection -- Typical constellation of symptoms seen with acute bacterial sinus infection are:  Green-gold, foul-smelling, foul-tasting mucus from nose and throat, inability to breathe through nose, inability to smell or taste well, facial pain and swelling, dental pain, headaches around eyes, sore throat and productive cough. Sinus imaging may be needed to rule out infection if these symptoms are present.    4. Pharyngitis/Tonsillitis -- Typical constellation of symptoms seen with acute bacterial tonsillitis/pharyngitis are:  Smelly pus (exudate), very red inflamed throat, swollen lymph nodes, fever, general malaise, absence of cough. If these symptoms are present, you may need a throat swab.         How Acid Reflux Affects Your Throat    Do you have to clear your throat or cough often? Are you hoarse? Do you have trouble swallowing? If you have these or other throat symptoms, you may have acid reflux. This occurs when stomach acid flows back up and irritates  "your throat.  Why you have throat symptoms  There are muscles (esophageal sphincters) at both ends of the tube that carries food to your stomach (the esophagus). These muscles relax to let food pass. Then they tighten to keep stomach acid down. When the lower esophageal sphincter (LES) doesnt tighten enough, acid can flow back (reflux) from your stomach into your esophagus. This may cause heartburn. In some cases the upper esophageal sphincter (UES) also doesnt work well. Then acid can travel higher and enter your throat (pharynx). In many cases, this causes throat symptoms.  Common throat symptoms  · Need to clear your throat often  · Feeling like youre choking  · Long-term (chronic) cough  · Hoarseness  · Trouble swallowing  · Feel like you have a lump in your throat  · Sour or acid taste  · Sore throat that keeps coming back     LARYNGOPHARYNGEAL REFLUX  (SILENT OR ATYPICAL REFLUX)    If you have any of the following symptoms you may have laryngopharyngeal reflux (LPR):  hoarseness, thick or too much mucus, chronic throat pain/irritation, chronic throat clearing, chronic cough, especially cough that wake you up from sleep, chronic "postnasal drip" without the need to blow your nose.     Many people with LPR do not have symptoms of heartburn. Compared to the esophagus, the voice box and the back of the throat are significantly more sensitive to the effects of acid on surrounding tissue. Acid passing quickly through the esophagus does not have a chance to irritate the area for too long.  However acid that pools in the throat or voice box can cause prolonged irritation resulting in the symptoms of LPR. In patients known to have LPR, 71% reported hoarseness, 51% reported chronic cough, 47% reported sensation of thickness or lump in the back of the throat, 42% reported chronic throat clearing, and 35% reported trouble swallowing.     Another major symptom of LPR is "postnasal drip."  Patients are often told " "symptoms are due to abnormal nasal drainage or sinus infection; however this is rarely the cause of chronic throat irritation. For post nasal drip to cause the complaints described, signs and symptoms of an active nasal infection should be present.     Treatments for LPR include:  postural changes, weight reduction, diet modification, medication to reduce stomach acid and promote normal motility, and surgery to prevent reflux. Most patients will begin to notice some relief in her symptoms about 2 weeks after starting the medication; however it is generally recommended the medication should be continued for 2 months. If the symptoms completely resolve, the medication can then be tapered.  Some people will remain symptom free while others may have relapses which required treatment again.    Things you can do to prevent reflux include:  Do not smoke.  Smoking will cause reflux.  Avoid tight fitting clothes or belts around the waist.  Avoid vigorous exercise at least 2 hours before bedtime. Avoid eating at least 2 hours prior to bedtime.  In fact avoid eating your largest meal at night.  Weight loss.  For patient's with recent weight gain, shedding a few pounds is all that is required to improve reflux.  Avoid caffeine, cola beverages, citrus beverages, mints, alcoholic beverages, particularly at night, cheese, fried foods, spicy foods, eggs, and chocolate.  Sleep with the head of bed elevated at least 6 inches ("MedCline" wedge pillow).    Recommendations:    Take Nexium or Prilosec (PPI) every morning on an empty stomach (30-60 minutes before eating) 40 MG.   At bedtime take Zantac (H2-blocker) 300 mg.    After 4-8 weeks, with significant symptomatic improvement, you may begin weaning your reflux medications down:  Nexium or Prilosec 40 mg --> to 20 mg (over-the-counter strength).  Zantac 300 mg --> to 150 mg (over-the-counter stength).  Then continue to wean as symptoms allow.    See a Gastro doctor (GI) for " refractory symptoms and continued management.

## 2019-02-06 NOTE — PROGRESS NOTES
Subjective:       Patient ID: Luis Miguel Murcia is a 73 y.o. female.    Chief Complaint: Cough (coughing up red/brown mucus) and Sore Throat    HPI   Patient last seen by me nearly 3 years ago, at which time her sinus films were negative for sinusitis. She was seen in 2012 and 2014 for LPRD-related throat symptoms.   Patient states that every other day for the past month, she has spit out a large thick crusty piece of mucus from the back of her nose/throat. It never comes out through her nose, only through her throat. She has to use neti pot to soften it, then can feel it work its way down into her throat to where she can expel it. But it keeps coming back.     Review of Systems   Constitutional: Negative.    HENT: Positive for sore throat, trouble swallowing and voice change. Negative for congestion, dental problem, facial swelling, postnasal drip, rhinorrhea, sinus pressure, sinus pain and sneezing.         Hyposmia  Frequent throat clearing  Sensation of thick or too much mucus in the back of her throat   Eyes: Negative.    Respiratory: Negative.  Negative for cough.    Cardiovascular: Negative.    Gastrointestinal: Negative.    Musculoskeletal: Negative.    Skin: Negative.    Neurological: Negative.    Hematological: Negative.    Psychiatric/Behavioral: Negative.        Objective:      Physical Exam   Constitutional: She is oriented to person, place, and time. Vital signs are normal. She appears well-developed and well-nourished. She is cooperative. She does not appear ill. No distress.   HENT:   Head: Normocephalic and atraumatic.   Right Ear: Hearing, tympanic membrane, external ear and ear canal normal. Tympanic membrane is not erythematous. No middle ear effusion.   Left Ear: Hearing, tympanic membrane, external ear and ear canal normal. Tympanic membrane is not erythematous.  No middle ear effusion.   Nose: Nose normal. No mucosal edema or rhinorrhea. Right sinus exhibits no maxillary sinus tenderness  and no frontal sinus tenderness. Left sinus exhibits no maxillary sinus tenderness and no frontal sinus tenderness.   Mouth/Throat: Uvula is midline, oropharynx is clear and moist and mucous membranes are normal. Mucous membranes are not pale, not dry and not cyanotic. No oral lesions. No oropharyngeal exudate, posterior oropharyngeal edema or posterior oropharyngeal erythema.   Eyes: Conjunctivae, EOM and lids are normal. Pupils are equal, round, and reactive to light. Right eye exhibits no discharge. Left eye exhibits no discharge. No scleral icterus.   Neck: Trachea normal and normal range of motion. Neck supple. No tracheal deviation present. No thyroid mass and no thyromegaly present.   Cardiovascular: Normal rate.   Pulmonary/Chest: Effort normal. No stridor. No respiratory distress. She has no wheezes.   Musculoskeletal: Normal range of motion.   Lymphadenopathy:        Head (right side): No submental, no submandibular, no tonsillar, no preauricular and no posterior auricular adenopathy present.        Head (left side): No submental, no submandibular, no tonsillar, no preauricular and no posterior auricular adenopathy present.     She has no cervical adenopathy.        Right cervical: No superficial cervical and no posterior cervical adenopathy present.       Left cervical: No superficial cervical and no posterior cervical adenopathy present.   Neurological: She is alert and oriented to person, place, and time. She has normal strength. Coordination and gait normal.   Skin: Skin is warm, dry and intact. No lesion and no rash noted. She is not diaphoretic. No cyanosis. No pallor.   Psychiatric: She has a normal mood and affect. Her speech is normal and behavior is normal. Judgment and thought content normal. Cognition and memory are normal.   Nursing note and vitals reviewed.      Procedure: Flexible laryngoscopy    In order to fully examine the upper aerodigestive tract, including the larynx, in a patient with  a hyperactive gag reflex, and suboptimal visualization with indirect mirror exam,  flexible endoscopy is required.   After explaining the procedure and obtaining verbal consent, a timeout was performed with the patient's participation according to the universal protocol. Both nasal cavities were anesthetized with 4% Xylocaine spray mixed with Sonny-Synephrine. The flexible laryngoscope  was inserted into the nasal cavity and advanced to visualize the nasal cavity, nasopharynx, the posterior oropharynx, hypopharynx, and the endolarynx with the  findings noted. The scope was removed and the procedure terminated. The patient tolerated this procedure well without apparent complication.     OVERALL FINDINGS  Nasopharynx - the torus is clear. There are no lesions of the posterior wall.   Oropharynx - no lesions of the tongue base. There is no obvious fullness or asymmetry.  Hypopharynx - there are no lesions of the pyriform sinuses or postcricoid region   Larynx - there are no lesions of the supraglottic or glottic larynx.  Vocal fold mobility is normal.     SPECIFIC FINDINGS  Adenoid tissue - normal   Nasopharynx & eustachian tube orifices - normal   Posterior pharyngeal wall - normal   Base of tongue - normal   Epiglottis - normal   Valleculae - normal   Pyriform sinuses - normal   False vocal cords - normal   True vocal cords - normal  Arytenoids - normal   Interarytenoid space - normal           Assessment:     Nasopharyngitis      Plan:     Crusting on posterior nasopharyngeal wall swabbed for culture    Doxycycline X 2 weeks  Recheck in 3-4 weeks

## 2019-02-08 ENCOUNTER — TELEPHONE (OUTPATIENT)
Dept: OTOLARYNGOLOGY | Facility: CLINIC | Age: 74
End: 2019-02-08

## 2019-02-08 LAB
BACTERIA SPEC AEROBE CULT: NORMAL
BACTERIA SPEC AEROBE CULT: NORMAL

## 2019-02-08 NOTE — TELEPHONE ENCOUNTER
Results and recommendations given to the pt, pt verbalized understanding and states she will call if not better after finishing abx.

## 2019-02-08 NOTE — TELEPHONE ENCOUNTER
----- Message from Hanane Pickard NP sent at 2/8/2019 11:20 AM CST -----  Culture grew a couple of different organisms. Finish all antibiotics and then if not 100% resolved, will try a different antibiotic.

## 2019-02-15 DIAGNOSIS — J31.0 CHRONIC RHINITIS: ICD-10-CM

## 2019-02-15 RX ORDER — AZELASTINE 1 MG/ML
SPRAY, METERED NASAL
Qty: 30 ML | Refills: 5 | Status: SHIPPED | OUTPATIENT
Start: 2019-02-15 | End: 2019-04-22 | Stop reason: SDUPTHER

## 2019-02-15 NOTE — PROGRESS NOTES
Refill Authorization Note     is requesting a refill authorization.    Brief assessment and rationale for refill: ROUTE: op  Name and strength of medication: azelastine (ASTELIN) 137 mcg (0.1 %) nasal spray            Medication reconciliation completed: No                         Comments:   Appointments (past 12 m or future 3m authorizing provider)  LAST VISIT DATE  Juan Francisco Jeong MD 1/22/2019         NEXT VISIT DATE  Juan Francisco Jeong MD 4/22/2019

## 2019-02-18 ENCOUNTER — DOCUMENTATION ONLY (OUTPATIENT)
Dept: FAMILY MEDICINE | Facility: CLINIC | Age: 74
End: 2019-02-18

## 2019-02-18 NOTE — PROGRESS NOTES
PA initiated for Hydrocodone/APAP 5/325 tablets.  CMM: [id number]  [email or web address]  -------------------------------------  No PA required.... Quantity limit

## 2019-02-20 ENCOUNTER — TELEPHONE (OUTPATIENT)
Dept: FAMILY MEDICINE | Facility: CLINIC | Age: 74
End: 2019-02-20

## 2019-02-20 NOTE — TELEPHONE ENCOUNTER
----- Message from Marizol Ellis sent at 2/19/2019 11:45 AM CST -----  Contact: Patient  Patient is having trouble getting her prescription since 2/10 because her insurance needs additional information on it.  HYDROcodone-acetaminophen (NORCO) 5-325 mg per tablet  Call Back#[phone]  Thanks

## 2019-02-20 NOTE — TELEPHONE ENCOUNTER
Pt notified that tylenol OTC can be used until PA completed. Advised pt that if not approved, she can but for cash. Pt states that she would not be able to afford that monthly and is upset that her treatment is being delayed. advised pt that we are working on PA but cannot guarantee it will be approved. Pt advised if not approved Dr. Jeong would be notified and new plan of care discussed.

## 2019-02-20 NOTE — TELEPHONE ENCOUNTER
"Dr. Jeong out of clinic. Please advise.    Notified pt that hydrocodone PA is pending.  Initiation occurred 2/18/19.    Pt is asking what she can take for pain in the meantime.  States that she can not take Nsaids due to Hx of "stroke, CHF and high blood pressure, all at the same time.  I have been told not to take them".    She specifically asked if ok to take tylenol.  Please advise.  "

## 2019-03-11 ENCOUNTER — TELEPHONE (OUTPATIENT)
Dept: OTOLARYNGOLOGY | Facility: CLINIC | Age: 74
End: 2019-03-11

## 2019-03-11 DIAGNOSIS — J00 INFECTIVE NASOPHARYNGITIS: Primary | ICD-10-CM

## 2019-03-11 RX ORDER — CIPROFLOXACIN 500 MG/1
500 TABLET ORAL 2 TIMES DAILY
Qty: 28 TABLET | Refills: 0 | Status: SHIPPED | OUTPATIENT
Start: 2019-03-11 | End: 2019-03-25

## 2019-03-11 NOTE — TELEPHONE ENCOUNTER
----- Message from Shelbi Natarajan sent at 3/11/2019 12:05 PM CDT -----  Contact: self   Patient was prescribed a antibiotic on her last visiti it is not working, please call back to discuss at [phone] (home)

## 2019-03-11 NOTE — TELEPHONE ENCOUNTER
Called patient advised will route message to Mrs. aguilar because she had already planned to prescribed a different medication if one didn't work

## 2019-03-31 DIAGNOSIS — F41.9 ANXIETY: ICD-10-CM

## 2019-04-01 NOTE — PROGRESS NOTES
Refill Authorization Note     is requesting a refill authorization.    Brief assessment and rationale for refill: ROUTE: op  Name and strength of medication: ALPRAZolam (XANAX) 0.25 MG tablet            Medication reconciliation completed: No                         Comments:   APPOINTMENTS (past 12 m or future 3m authorizing provider)  LAST VISIT DATE  Juan Francisco Jeong MD 1/22/2019         NEXT VISIT DATE  Juan Francisco Jeong MD 4/22/2019

## 2019-04-02 RX ORDER — ALPRAZOLAM 0.25 MG/1
TABLET ORAL
Qty: 60 TABLET | Refills: 2 | Status: SHIPPED | OUTPATIENT
Start: 2019-04-02 | End: 2019-07-03 | Stop reason: SDUPTHER

## 2019-04-08 ENCOUNTER — PATIENT OUTREACH (OUTPATIENT)
Dept: ADMINISTRATIVE | Facility: HOSPITAL | Age: 74
End: 2019-04-08

## 2019-04-22 ENCOUNTER — OFFICE VISIT (OUTPATIENT)
Dept: FAMILY MEDICINE | Facility: CLINIC | Age: 74
End: 2019-04-22
Payer: MEDICARE

## 2019-04-22 VITALS
HEIGHT: 62 IN | WEIGHT: 121.69 LBS | SYSTOLIC BLOOD PRESSURE: 120 MMHG | OXYGEN SATURATION: 97 % | HEART RATE: 50 BPM | DIASTOLIC BLOOD PRESSURE: 72 MMHG | TEMPERATURE: 98 F | BODY MASS INDEX: 22.39 KG/M2

## 2019-04-22 DIAGNOSIS — G47.00 INSOMNIA, UNSPECIFIED TYPE: ICD-10-CM

## 2019-04-22 DIAGNOSIS — J31.0 CHRONIC RHINITIS: ICD-10-CM

## 2019-04-22 DIAGNOSIS — M54.16 LUMBAR RADICULOPATHY: ICD-10-CM

## 2019-04-22 DIAGNOSIS — F41.9 ANXIETY: ICD-10-CM

## 2019-04-22 DIAGNOSIS — L30.9 ECZEMA, UNSPECIFIED TYPE: ICD-10-CM

## 2019-04-22 DIAGNOSIS — J44.9 CHRONIC OBSTRUCTIVE PULMONARY DISEASE, UNSPECIFIED COPD TYPE: ICD-10-CM

## 2019-04-22 DIAGNOSIS — I10 ESSENTIAL HYPERTENSION: Primary | ICD-10-CM

## 2019-04-22 PROCEDURE — 1101F PR PT FALLS ASSESS DOC 0-1 FALLS W/OUT INJ PAST YR: ICD-10-PCS | Mod: CPTII,S$GLB,, | Performed by: FAMILY MEDICINE

## 2019-04-22 PROCEDURE — 99214 PR OFFICE/OUTPT VISIT, EST, LEVL IV, 30-39 MIN: ICD-10-PCS | Mod: S$GLB,,, | Performed by: FAMILY MEDICINE

## 2019-04-22 PROCEDURE — 3074F PR MOST RECENT SYSTOLIC BLOOD PRESSURE < 130 MM HG: ICD-10-PCS | Mod: CPTII,S$GLB,, | Performed by: FAMILY MEDICINE

## 2019-04-22 PROCEDURE — 99499 UNLISTED E&M SERVICE: CPT | Mod: S$GLB,,, | Performed by: FAMILY MEDICINE

## 2019-04-22 PROCEDURE — 1101F PT FALLS ASSESS-DOCD LE1/YR: CPT | Mod: CPTII,S$GLB,, | Performed by: FAMILY MEDICINE

## 2019-04-22 PROCEDURE — 99999 PR PBB SHADOW E&M-EST. PATIENT-LVL III: ICD-10-PCS | Mod: PBBFAC,,, | Performed by: FAMILY MEDICINE

## 2019-04-22 PROCEDURE — 99214 OFFICE O/P EST MOD 30 MIN: CPT | Mod: S$GLB,,, | Performed by: FAMILY MEDICINE

## 2019-04-22 PROCEDURE — 3074F SYST BP LT 130 MM HG: CPT | Mod: CPTII,S$GLB,, | Performed by: FAMILY MEDICINE

## 2019-04-22 PROCEDURE — 99499 RISK ADDL DX/OHS AUDIT: ICD-10-PCS | Mod: S$GLB,,, | Performed by: FAMILY MEDICINE

## 2019-04-22 PROCEDURE — 99999 PR PBB SHADOW E&M-EST. PATIENT-LVL III: CPT | Mod: PBBFAC,,, | Performed by: FAMILY MEDICINE

## 2019-04-22 PROCEDURE — 3078F DIAST BP <80 MM HG: CPT | Mod: CPTII,S$GLB,, | Performed by: FAMILY MEDICINE

## 2019-04-22 PROCEDURE — 3078F PR MOST RECENT DIASTOLIC BLOOD PRESSURE < 80 MM HG: ICD-10-PCS | Mod: CPTII,S$GLB,, | Performed by: FAMILY MEDICINE

## 2019-04-22 RX ORDER — HYDROCODONE BITARTRATE AND ACETAMINOPHEN 5; 325 MG/1; MG/1
1 TABLET ORAL 2 TIMES DAILY PRN
Qty: 60 TABLET | Refills: 0 | Status: SHIPPED | OUTPATIENT
Start: 2019-04-24 | End: 2019-05-24

## 2019-04-22 RX ORDER — TRIAMCINOLONE ACETONIDE 1 MG/G
CREAM TOPICAL 2 TIMES DAILY
Qty: 80 G | Refills: 5 | Status: SHIPPED | OUTPATIENT
Start: 2019-04-22 | End: 2020-02-14

## 2019-04-22 RX ORDER — HYDROCODONE BITARTRATE AND ACETAMINOPHEN 5; 325 MG/1; MG/1
1 TABLET ORAL 2 TIMES DAILY PRN
Qty: 60 TABLET | Refills: 0 | Status: SHIPPED | OUTPATIENT
Start: 2019-06-23 | End: 2019-07-23

## 2019-04-22 RX ORDER — PRAVASTATIN SODIUM 20 MG/1
TABLET ORAL
Qty: 90 TABLET | Refills: 3 | Status: SHIPPED | OUTPATIENT
Start: 2019-04-22 | End: 2020-05-14

## 2019-04-22 RX ORDER — AZELASTINE 1 MG/ML
SPRAY, METERED NASAL
Qty: 30 ML | Refills: 11 | Status: SHIPPED | OUTPATIENT
Start: 2019-04-22 | End: 2019-08-09 | Stop reason: SDUPTHER

## 2019-04-22 RX ORDER — HYDROCODONE BITARTRATE AND ACETAMINOPHEN 5; 325 MG/1; MG/1
1 TABLET ORAL 2 TIMES DAILY PRN
Qty: 60 TABLET | Refills: 0 | Status: SHIPPED | OUTPATIENT
Start: 2019-05-24 | End: 2019-06-23

## 2019-04-22 NOTE — PROGRESS NOTES
Subjective:       Patient ID: Luis Miguel Murcia is a 74 y.o. female.    Chief Complaint: Medication Refill (3 month follow up)    HPI     Here for a f/u.     htn is stable. No c/o cp or sob at rest or exertion. No headaches.      C/o chronic right lumbar radicular pain > 1 year. Ps: 2-3/10 while on norco. Reports mild relief from poppy L5-S1 in 7/5/2018. Has residual constant neuropathy from right calf to right foot which is worse at night.  Takes gabapentin 300 mg qhs for the neuropathy.      Also, has chronic neck pain > 1 year. Ps: 2-3/10 while on norco       Imaging:  C-spine MRI 9/30/10: There is anterolisthesis of C3 on 4 and 4 on 5 mile, retrolisthesis of C5 and 6. At C3/4 there is a mild bulge with foraminal narrowing bilaterally mild. At C5-C6 there is a disc bulge with right upper protrusion of the disc but severe foraminal narrowing on the right and moderate to severe on the left. There is disc bulging at C6/7 with a bulge more to the right.     MRI L-spine 5/24/2018: There is multilevel degenerative change discussed in detail above.  There is no fracture.  There is trace anterolisthesis of L4 on L5 which is degenerative in etiology and unchanged.  All of the discs are desiccated.  There is some degree of disc space narrowing, disc bulge with a without disc protrusions, facet joint arthropathy contributing to stenosis.  These findings will be summarized below. There is degenerative change but there is no spinal canal or significant foraminal stenosis at the T10-11, T11-12, T12-L1 or L1-2 levels. At the L2-3 level, there is mild spinal canal and left foraminal stenosis.  There is facet joint arthropathy.  There is a disc bulge with small left paracentral disc protrusion.  The disc protrusion has occurred since the prior study in the facet joint changes have progressed. At the L3-4 level, there is mild spinal stenosis with moderate left lateral recess stenosis.  Additionally, there is mild-to-moderate  left and mild right foraminal stenosis without definite change. At the L4-5 level, there is multifactorial moderate central spinal stenosis without any significant foraminal stenosis and without definite change. At the L5-S1 level, there is right greater than left facet joint arthropathy.  There is a disc bulge with osteophytic ridging and broad central disc protrusion but there is no significant spinal stenosis.  There is stable mild left and moderate-to-marked right foraminal stenosis.Anxiety stable while taking xanax.     Copd stable. No worsening cough or wheeze.     Insomnia stable while on ambien.      Has chronic rhinitis. Taking astelin. Needs rf.     Takes kenalog cream for eczema. Needs rf      Review of Systems      Review of Systems   Constitutional: Negative for fever and chills.   HENT: Negative for hearing loss and neck stiffness.    Eyes: Negative for redness and itching.   Respiratory: Negative for cough and choking.    Cardiovascular: Negative for chest pain and leg swelling.  Abdomen: Negative for abdominal pain and blood in stool.   Genitourinary: Negative for dysuria and flank pain.   Musculoskeletal: Negative for gait problem.   Neurological: Negative for light-headedness and headaches.   Hematological: Negative for adenopathy.   Psychiatric/Behavioral: Negative for behavioral problems.     Objective:      Physical Exam   Constitutional: She appears well-developed.   HENT:   Head: Normocephalic and atraumatic.   Eyes: Pupils are equal, round, and reactive to light. Conjunctivae are normal.   Neck: Normal range of motion.   Cardiovascular: Normal rate and regular rhythm.   No murmur heard.  Pulmonary/Chest: Effort normal and breath sounds normal.   Lymphadenopathy:     She has no cervical adenopathy.       Assessment:       1. Essential hypertension    2. Chronic rhinitis    3. Lumbar radiculopathy    4. Anxiety    5. Insomnia, unspecified type    6. Chronic obstructive pulmonary disease,  unspecified COPD type    7. Eczema, unspecified type        Plan:       Essential hypertension    Chronic rhinitis    Lumbar radiculopathy    Anxiety    Insomnia, unspecified type    Chronic obstructive pulmonary disease, unspecified COPD type    Eczema, unspecified type    Other orders  -     azelastine (ASTELIN) 137 mcg (0.1 %) nasal spray; USE 2 SPRAYS IN EACH NOSTRIL EVERY DAY. MAY USE UP TO 2 TIMES DAILY DEPENDING ON SYMPTOMS  Dispense: 30 mL; Refill: 11  -     pravastatin (PRAVACHOL) 20 MG tablet; TAKE 1 TABLET(20 MG) BY MOUTH EVERY NIGHT  Dispense: 90 tablet; Refill: 3  -     HYDROcodone-acetaminophen (NORCO) 5-325 mg per tablet; Take 1 tablet by mouth 2 (two) times daily as needed for Pain.  Dispense: 60 tablet; Refill: 0  -     HYDROcodone-acetaminophen (NORCO) 5-325 mg per tablet; Take 1 tablet by mouth 2 (two) times daily as needed for Pain.  Dispense: 60 tablet; Refill: 0  -     HYDROcodone-acetaminophen (NORCO) 5-325 mg per tablet; Take 1 tablet by mouth 2 (two) times daily as needed for Pain.  Dispense: 60 tablet; Refill: 0  -     triamcinolone acetonide 0.1% (KENALOG) 0.1 % cream; Apply topically 2 (two) times daily.  Dispense: 80 g; Refill: 5            Plan:  Cont current meds        Medication List with Changes/Refills   New Medications    HYDROCODONE-ACETAMINOPHEN (NORCO) 5-325 MG PER TABLET    Take 1 tablet by mouth 2 (two) times daily as needed for Pain.    HYDROCODONE-ACETAMINOPHEN (NORCO) 5-325 MG PER TABLET    Take 1 tablet by mouth 2 (two) times daily as needed for Pain.    TRIAMCINOLONE ACETONIDE 0.1% (KENALOG) 0.1 % CREAM    Apply topically 2 (two) times daily.   Current Medications    ALPRAZOLAM (XANAX) 0.25 MG TABLET    TAKE 1 TABLET BY MOUTH TWICE DAILY AS NEEDED FOR ANXIETY    AMLODIPINE (NORVASC) 5 MG TABLET    TAKE 1 TABLET(5 MG) BY MOUTH EVERY DAY    ASCORBATE CALCIUM (VITAMIN C ORAL)    Take 1,000 mg by mouth once daily.     ASPIRIN (ECOTRIN) 81 MG EC TABLET    Take 81 mg by mouth  once daily.    B COMPLEX VITAMINS TABLET    Take 1 tablet by mouth once daily.      CALCIUM CITRATE-VITAMIN D3 ORAL        CARVEDILOL (COREG) 12.5 MG TABLET    TAKE 1 TABLET(12.5 MG) BY MOUTH TWICE DAILY WITH MEALS    CO-ENZYME Q-10 30 MG CAPSULE        DEXILANT 60 MG CAPSULE    TAKE 1 CAPSULE(60 MG) BY MOUTH EVERY MORNING BEFORE BREAKFAST    FLUTICASONE (FLONASE) 50 MCG/ACTUATION NASAL SPRAY    2 sprays (100 mcg total) by Each Nare route once daily.    GABAPENTIN (NEURONTIN) 600 MG TABLET    Take 1 tablet (600 mg total) by mouth every evening.    RANITIDINE (ZANTAC) 150 MG TABLET    TAKE 1 TABLET(150 MG) BY MOUTH EVERY NIGHT    VENTOLIN HFA 90 MCG/ACTUATION INHALER    INHALE 2 PUFFS INTO THE LUNGS EVERY 6 HOURS AS NEEDED FOR WHEEZING    VITAMIN B-12 1000 MCG TABLET    Take 1,000 mcg by mouth once daily.     ZOLPIDEM (AMBIEN) 10 MG TAB    Take 1 tablet (10 mg total) by mouth nightly.   Changed and/or Refilled Medications    Modified Medication Previous Medication    AZELASTINE (ASTELIN) 137 MCG (0.1 %) NASAL SPRAY azelastine (ASTELIN) 137 mcg (0.1 %) nasal spray       USE 2 SPRAYS IN EACH NOSTRIL EVERY DAY. MAY USE UP TO 2 TIMES DAILY DEPENDING ON SYMPTOMS    USE 2 SPRAYS IN EACH NOSTRIL EVERY DAY. MAY USE UP TO 2 TIMES DAILY DEPENDING ON SYMPTOMS    HYDROCODONE-ACETAMINOPHEN (NORCO) 5-325 MG PER TABLET HYDROcodone-acetaminophen (NORCO) 5-325 mg per tablet       Take 1 tablet by mouth 2 (two) times daily as needed for Pain.    Take 1 tablet by mouth 2 (two) times daily as needed for Pain.    PRAVASTATIN (PRAVACHOL) 20 MG TABLET pravastatin (PRAVACHOL) 20 MG tablet       TAKE 1 TABLET(20 MG) BY MOUTH EVERY NIGHT    TAKE 1 TABLET(20 MG) BY MOUTH EVERY NIGHT

## 2019-04-30 ENCOUNTER — TELEPHONE (OUTPATIENT)
Dept: FAMILY MEDICINE | Facility: CLINIC | Age: 74
End: 2019-04-30

## 2019-04-30 NOTE — TELEPHONE ENCOUNTER
----- Message from Cindy Simmons sent at 4/30/2019  2:03 PM CDT -----  Type: Needs Medical Advice    Who Called:  Patient     Best Call Back Number: [phone]  Additional Information:FYI:  patient states she is having issue's with insurance getting a refill on zolpidem (AMBIEN) 10 mg Tab, [facility] and fax back to them to get her medication approved.     Thank you

## 2019-04-30 NOTE — TELEPHONE ENCOUNTER
Spoke to pt. Stated she was recently here and switched to [facility]. And they only pay up to a certain amount of her sleeping pills. Stated [facility] will send us a form for Dr. Jeong to fill out so she can get her medication. Advised pt. I will send to  and informed her he is out for a week. Pt. Verbalized understanding. Phone call ended.

## 2019-05-01 ENCOUNTER — TELEPHONE (OUTPATIENT)
Dept: FAMILY MEDICINE | Facility: CLINIC | Age: 74
End: 2019-05-01

## 2019-05-01 ENCOUNTER — DOCUMENTATION ONLY (OUTPATIENT)
Dept: FAMILY MEDICINE | Facility: CLINIC | Age: 74
End: 2019-05-01

## 2019-05-01 NOTE — TELEPHONE ENCOUNTER
----- Message from Sherie Marrero sent at 5/1/2019  1:54 PM CDT -----  Contact: [facility]  Type: Needs Medical Advice    Who Called: [facility]  Symptoms (please be specific):  na  How long has patient had these symptoms:  na  Pharmacy name and phone #:  [facility]  Best Call Back Number: [phone] direct line  Additional Information: Maame states patient needs a diagnosis code for the reason she is taking zolpidem (AMBIEN) 10 mg Tab. Please call to advise. Thanks!

## 2019-05-01 NOTE — TELEPHONE ENCOUNTER
Called [facility] spoke to domonique . Gave diagnosis code for ambien. Verbalized understanding. Phone call ended.

## 2019-05-01 NOTE — PROGRESS NOTES
Received PA form for additional information for Zolpidem 10mg tablets. Filled out form and faxed to [employer] ([phone]).  ---------------------------------------------------------  5/2/2019: APPROVED  Valid: 5/2/19-12/31/19

## 2019-05-27 DIAGNOSIS — K21.9 LPRD (LARYNGOPHARYNGEAL REFLUX DISEASE): ICD-10-CM

## 2019-05-28 RX ORDER — DEXLANSOPRAZOLE 60 MG/1
CAPSULE, DELAYED RELEASE ORAL
Qty: 90 CAPSULE | Refills: 0 | Status: SHIPPED | OUTPATIENT
Start: 2019-05-28 | End: 2019-10-23 | Stop reason: SDUPTHER

## 2019-07-03 ENCOUNTER — TELEPHONE (OUTPATIENT)
Dept: FAMILY MEDICINE | Facility: CLINIC | Age: 74
End: 2019-07-03

## 2019-07-03 DIAGNOSIS — F41.9 ANXIETY: ICD-10-CM

## 2019-07-03 NOTE — PROGRESS NOTES
Refill Authorization Note     is requesting a refill authorization.    Brief assessment and rationale for refill: ROUTE: OP  Name and strength of medication: ALPRAZOLAM 0.25MG TABLETS       Medication Therapy Plan: ROUTE: OP - LAST PRESCRIBED 4/19 FOR 3 MONTH SUPPLY    Medication reconciliation completed: No              How patient will take medication: T1T BID          Comments: APPOINTMENTS (past 12m or future 3m authorizing provider)  LAST VISIT DATE  Juan Francisco Jeong MD 4/22/2019         NEXT VISIT DATE  Juan Francisco Jeong MD 7/23/2019

## 2019-07-03 NOTE — TELEPHONE ENCOUNTER
----- Message from Chelsea Munroe sent at 7/3/2019  3:19 PM CDT -----  Contact: Luis Miguel  Type:  RX Refill Request    Who Called:  patient  Refill or New Rx:  refill  RX Name and Strength:  ALPRAZolam (XANAX) 0.25 MG tablet  How is the patient currently taking it? (ex. 1XDay):  As directed  Is this a 30 day or 90 day RX:  30  Preferred Pharmacy with phone number:    [facility] [address] [address] [address] [place] [address] [address]  [facility] [address] [place] [id number]  Phone: [phone] Fax: [phone]    Local or Mail Order:  local  Ordering Provider:  Jean-Paul Bowie Call Back Number:  [phone]  Additional Information:  Patient is out of medication--please advise--thank you

## 2019-07-05 RX ORDER — ALPRAZOLAM 0.25 MG/1
TABLET ORAL
Qty: 60 TABLET | Refills: 2 | Status: SHIPPED | OUTPATIENT
Start: 2019-07-05 | End: 2019-10-05 | Stop reason: SDUPTHER

## 2019-07-08 RX ORDER — FLUTICASONE PROPIONATE 50 MCG
SPRAY, SUSPENSION (ML) NASAL
Qty: 48 ML | Refills: 2 | Status: SHIPPED | OUTPATIENT
Start: 2019-07-08 | End: 2020-07-13

## 2019-07-08 NOTE — PROGRESS NOTES
Refill Authorization Note     is requesting a refill authorization.    Brief assessment and rationale for refill: APPROVE: prr  Name and strength of medication: FLUTICASONE 50MCG NASAL SP (120) RX       Medication Therapy Plan: hx of AR: jacinto 9 more                   How patient will take medication: UTD          Comments:

## 2019-07-15 ENCOUNTER — TELEPHONE (OUTPATIENT)
Dept: FAMILY MEDICINE | Facility: CLINIC | Age: 74
End: 2019-07-15

## 2019-07-15 NOTE — TELEPHONE ENCOUNTER
----- Message from Sandy Palacios sent at 7/15/2019 10:36 AM CDT -----  Contact: pt  ..Type: Needs Medical Advice    Who Called:  pt  Symptoms (please be specific):  Possible sinus infection  How long has patient had these symptoms: 5 days  Best Call Back Number: [phone]  Additional Information: Pt called to inform that she has a thick brownish yellowish mucus coming out of her nose, taking mucinex, but may need a prescription sent to pharmacy. Needs advise on what to do  Please advise  Thanks     [facility] [address] [place] [address] [address] [facility] [address] [place] [id number]  Phone: [phone] Fax: [phone]

## 2019-07-15 NOTE — TELEPHONE ENCOUNTER
Spoke to pt. Denied appt. For tomorrow. Pt. States the urgent care is 2 blocks away and she is going to go there tomorrow. Advised pt. That is her right just as long as she is evaluated. Pt. Verbalized understanding. Phone call ended.

## 2019-07-15 NOTE — TELEPHONE ENCOUNTER
Spoke to patient and patient refused appointment today. States she cannot come in today, as she is waiting on her great granddaughter to get to her house. States she is weak and cannot drive. Pt refuses to come in for an appointment period. States she has an appointment with Dr. Jeong in August and if she has to she will just wait until then to be seen. Pt states she gets sinus infections all the time and knows exactly what this is.

## 2019-07-15 NOTE — TELEPHONE ENCOUNTER
Spoke to patient and states she has a sinus infection. Pt has been taking OTC Mucinex but is not improving. Pt requesting antibiotic to be sent to pharmacy. Advised patient it would be best to schedule an appointment but patient wanted me to see if Dr. Jeong would send Rx to pharmacy. States she has COPD and with the humidity it is hard for her to go outside. Please advise Thanks.

## 2019-07-16 ENCOUNTER — OFFICE VISIT (OUTPATIENT)
Dept: URGENT CARE | Facility: CLINIC | Age: 74
End: 2019-07-16
Payer: MEDICARE

## 2019-07-16 VITALS
BODY MASS INDEX: 22.26 KG/M2 | DIASTOLIC BLOOD PRESSURE: 69 MMHG | TEMPERATURE: 98 F | RESPIRATION RATE: 18 BRPM | WEIGHT: 121 LBS | HEIGHT: 62 IN | SYSTOLIC BLOOD PRESSURE: 121 MMHG | OXYGEN SATURATION: 96 % | HEART RATE: 60 BPM

## 2019-07-16 DIAGNOSIS — J01.90 ACUTE BACTERIAL RHINOSINUSITIS: Primary | ICD-10-CM

## 2019-07-16 DIAGNOSIS — B96.89 ACUTE BACTERIAL RHINOSINUSITIS: Primary | ICD-10-CM

## 2019-07-16 PROCEDURE — 1101F PR PT FALLS ASSESS DOC 0-1 FALLS W/OUT INJ PAST YR: ICD-10-PCS | Mod: CPTII,S$GLB,, | Performed by: NURSE PRACTITIONER

## 2019-07-16 PROCEDURE — 99214 PR OFFICE/OUTPT VISIT, EST, LEVL IV, 30-39 MIN: ICD-10-PCS | Mod: S$GLB,,, | Performed by: NURSE PRACTITIONER

## 2019-07-16 PROCEDURE — 3078F PR MOST RECENT DIASTOLIC BLOOD PRESSURE < 80 MM HG: ICD-10-PCS | Mod: CPTII,S$GLB,, | Performed by: NURSE PRACTITIONER

## 2019-07-16 PROCEDURE — 3078F DIAST BP <80 MM HG: CPT | Mod: CPTII,S$GLB,, | Performed by: NURSE PRACTITIONER

## 2019-07-16 PROCEDURE — 99214 OFFICE O/P EST MOD 30 MIN: CPT | Mod: S$GLB,,, | Performed by: NURSE PRACTITIONER

## 2019-07-16 PROCEDURE — 3074F SYST BP LT 130 MM HG: CPT | Mod: CPTII,S$GLB,, | Performed by: NURSE PRACTITIONER

## 2019-07-16 PROCEDURE — 3074F PR MOST RECENT SYSTOLIC BLOOD PRESSURE < 130 MM HG: ICD-10-PCS | Mod: CPTII,S$GLB,, | Performed by: NURSE PRACTITIONER

## 2019-07-16 PROCEDURE — 1101F PT FALLS ASSESS-DOCD LE1/YR: CPT | Mod: CPTII,S$GLB,, | Performed by: NURSE PRACTITIONER

## 2019-07-16 RX ORDER — PREDNISONE 20 MG/1
20 TABLET ORAL DAILY
Qty: 4 TABLET | Refills: 0 | Status: SHIPPED | OUTPATIENT
Start: 2019-07-16 | End: 2019-07-20

## 2019-07-16 RX ORDER — AZITHROMYCIN 250 MG/1
TABLET, FILM COATED ORAL
Qty: 6 TABLET | Refills: 0 | Status: SHIPPED | OUTPATIENT
Start: 2019-07-16 | End: 2019-07-21

## 2019-07-16 NOTE — PROGRESS NOTES
"Subjective:       Patient ID: Luis Miguel Murcia is a 74 y.o. female.    Vitals:  height is 5' 2" (1.575 m) and weight is 54.9 kg (121 lb). Her temperature is 97.5 °F (36.4 °C). Her blood pressure is 121/69 and her pulse is 60. Her respiration is 18 and oxygen saturation is 96%.     Chief Complaint: Sinus Problem; Nasal Congestion; and COPD    Pt presents today with nasal congestion and post nasal drip, slight cough X's 5-6 days, pt taking Mucinex and using Karla pot with no relief    Sinus Problem   This is a new problem. The current episode started in the past 7 days. The problem is unchanged. There has been no fever. She is experiencing no pain. Associated symptoms include congestion, coughing and sinus pressure. Pertinent negatives include no chills, diaphoresis, ear pain, shortness of breath or sore throat. Treatments tried: Mucinex. The treatment provided no relief.   COPD   Associated symptoms include congestion and coughing. Pertinent negatives include no chills, diaphoresis, fatigue, fever, myalgias, nausea, rash, sore throat or vomiting.       Constitution: Negative for chills, sweating, fatigue and fever.   HENT: Positive for congestion, postnasal drip, sinus pain and sinus pressure. Negative for ear pain, sore throat and voice change.    Neck: Negative for painful lymph nodes.   Eyes: Negative for eye redness.   Respiratory: Positive for cough. Negative for chest tightness, sputum production, bloody sputum, COPD, shortness of breath, stridor, wheezing and asthma.    Gastrointestinal: Negative for nausea and vomiting.   Musculoskeletal: Negative for muscle ache.   Skin: Negative for rash.   Allergic/Immunologic: Negative for seasonal allergies and asthma.   Hematologic/Lymphatic: Negative for swollen lymph nodes.       Objective:      Physical Exam   Constitutional: She is oriented to person, place, and time. She appears well-developed and well-nourished. She is cooperative.  Non-toxic appearance. " She does not appear ill. No distress.   HENT:   Head: Normocephalic and atraumatic.   Right Ear: Hearing, tympanic membrane, external ear and ear canal normal. No middle ear effusion.   Left Ear: Hearing, tympanic membrane, external ear and ear canal normal.  No middle ear effusion.   Nose: Mucosal edema and rhinorrhea present. No nasal deformity. No epistaxis. Right sinus exhibits maxillary sinus tenderness and frontal sinus tenderness. Left sinus exhibits maxillary sinus tenderness and frontal sinus tenderness.   Mouth/Throat: Uvula is midline and mucous membranes are normal. No trismus in the jaw. Normal dentition. No uvula swelling. Posterior oropharyngeal edema and posterior oropharyngeal erythema present. No oropharyngeal exudate. No tonsillar exudate.   Eyes: Conjunctivae and lids are normal. No scleral icterus.   Sclera clear bilat   Neck: Trachea normal, full passive range of motion without pain and phonation normal. Neck supple.   Cardiovascular: Normal rate, regular rhythm, normal heart sounds, intact distal pulses and normal pulses.   Pulses:       Radial pulses are 2+ on the right side, and 2+ on the left side.   Pulmonary/Chest: Effort normal and breath sounds normal. No stridor. No respiratory distress. She has no decreased breath sounds. She has no wheezes. She has no rhonchi.   Abdominal: Soft. Normal appearance and bowel sounds are normal. She exhibits no distension. There is no tenderness.   Musculoskeletal: Normal range of motion. She exhibits no edema or deformity.   Lymphadenopathy:     She has cervical adenopathy.   Neurological: She is alert and oriented to person, place, and time. She exhibits normal muscle tone. Coordination normal.   Skin: Skin is warm, dry and intact. She is not diaphoretic. No pallor.   Psychiatric: She has a normal mood and affect. Her speech is normal and behavior is normal. Judgment and thought content normal. Cognition and memory are normal.   Nursing note and vitals  reviewed.      Assessment:       1. Acute bacterial rhinosinusitis        Plan:         Acute bacterial rhinosinusitis  -     azithromycin (Z-JOSEPHINE) 250 MG tablet; Take 2 tablets by mouth on day 1; Take 1 tablet by mouth on days 2-5  Dispense: 6 tablet; Refill: 0  -     predniSONE (DELTASONE) 20 MG tablet; Take 1 tablet (20 mg total) by mouth once daily. for 4 days  Dispense: 4 tablet; Refill: 0      Patient Instructions   Follow up with your doctor in a few days.  Return to the urgent care or go to the ER if symptoms get worse.    START ORAL ANTIBIOTICS-ZPAK TODAY.  Start oral steroids today.    Start taking your antibiotics and take for the full duration.        If high blood pressure, an alternative decongestant is Coricidin HBP   Flonase OTC as directed for the next 7 days  Continue astalin and terence pot.  Salt Water Nasal Spray OTC 3x/day for the next 7 days        Follow up with Primary Care or ENT if not improved in 7-10 Days        Sinusitis (Antibiotic Treatment)    The sinuses are air-filled spaces within the bones of the face. They connect to the inside of the nose. Sinusitis is an inflammation of the tissue lining the sinus cavity. Sinus inflammation can occur during a cold. It can also be due to allergies to pollens and other particles in the air. Sinusitis can cause symptoms of sinus congestion and fullness. A sinus infection causes fever, headache and facial pain. There is often green or yellow drainage from the nose or into the back of the throat (post-nasal drip). You have been given antibiotics to treat this condition.    Please return here or go to the Emergency Department for any concerns or worsening of condition.    Home care:  · Take the full course of antibiotics as instructed. Do not stop taking them, even if you feel better.  · Get rest!  · Drink plenty of water, hot tea, and other liquids. This may help thin mucus. It also may promote sinus drainage.  · Heat may help soothe painful areas of  "the face. Use a towel soaked in hot water. Or,  the shower and direct the hot spray onto your face. Using a vaporizer along with a menthol rub at night may also help.   · An expectorant containing guaifenesin may help thin the mucus and promote drainage from the sinuses.  · If you are a female and on birth control pills and take the antibiotics, use additional methods to prevent pregnancy while on the antibiotics and for one cycle after.  · Flonase (fluticasone) is an over the counter nasal spray which may help with your symptoms.  · Zyrtec D, Claritin D, or Allegra D can also help with symptoms of congestion and drainage  · If you have hypertesion, avoid using the "D" which is a decongestant.  · If clear drainage, you can take plain zyrtec, claritin, allegra.  · If congested, you can take pseudoephedrine-you need to ask for this behind the pharmacy counter-do not take if you have high blood pressure. Pheylephrine is on the shelf and is not effective.  · Tylenol or ibuprofen can be used as directed for pain, unless you have allergies. Avoid ibuprofen if medical conditions such as stomach ulcers, kidney or liver disease, or blood thinners  · Afrin is effective if flying in the next few days. Take as directed for the airplane flight upon taking off and landing. Do not continue to use Afrin as it will cause rebound congestion.  · Don't smoke. This can worsen symptoms.  Follow-up care  Follow up with your healthcare provider or our staff if you are not improving within the next week.    When to seek medical advice  Call your healthcare provider if any of these occur:  · Facial pain or headache becoming more severe  · Stiff neck  · Unusual drowsiness or confusion  · Swelling of the forehead or eyelids  · Vision problems, including blurred or double vision  · Fever of 100.4ºF (38ºC) or higher, or as directed by your healthcare provider  · Seizure  · Breathing problems  · Symptoms not resolving within 10 " days

## 2019-07-16 NOTE — PATIENT INSTRUCTIONS
Follow up with your doctor in a few days.  Return to the urgent care or go to the ER if symptoms get worse.    START ORAL ANTIBIOTICS-ZPAK TODAY.  Start oral steroids today.    Start taking your antibiotics and take for the full duration.        If high blood pressure, an alternative decongestant is Coricidin HBP   Flonase OTC as directed for the next 7 days  Continue astalin and terence pot.  Salt Water Nasal Spray OTC 3x/day for the next 7 days        Follow up with Primary Care or ENT if not improved in 7-10 Days        Sinusitis (Antibiotic Treatment)    The sinuses are air-filled spaces within the bones of the face. They connect to the inside of the nose. Sinusitis is an inflammation of the tissue lining the sinus cavity. Sinus inflammation can occur during a cold. It can also be due to allergies to pollens and other particles in the air. Sinusitis can cause symptoms of sinus congestion and fullness. A sinus infection causes fever, headache and facial pain. There is often green or yellow drainage from the nose or into the back of the throat (post-nasal drip). You have been given antibiotics to treat this condition.    Please return here or go to the Emergency Department for any concerns or worsening of condition.    Home care:  · Take the full course of antibiotics as instructed. Do not stop taking them, even if you feel better.  · Get rest!  · Drink plenty of water, hot tea, and other liquids. This may help thin mucus. It also may promote sinus drainage.  · Heat may help soothe painful areas of the face. Use a towel soaked in hot water. Or,  the shower and direct the hot spray onto your face. Using a vaporizer along with a menthol rub at night may also help.   · An expectorant containing guaifenesin may help thin the mucus and promote drainage from the sinuses.  · If you are a female and on birth control pills and take the antibiotics, use additional methods to prevent pregnancy while on the antibiotics  "and for one cycle after.  · Flonase (fluticasone) is an over the counter nasal spray which may help with your symptoms.  · Zyrtec D, Claritin D, or Allegra D can also help with symptoms of congestion and drainage  · If you have hypertesion, avoid using the "D" which is a decongestant.  · If clear drainage, you can take plain zyrtec, claritin, allegra.  · If congested, you can take pseudoephedrine-you need to ask for this behind the pharmacy counter-do not take if you have high blood pressure. Pheylephrine is on the shelf and is not effective.  · Tylenol or ibuprofen can be used as directed for pain, unless you have allergies. Avoid ibuprofen if medical conditions such as stomach ulcers, kidney or liver disease, or blood thinners  · Afrin is effective if flying in the next few days. Take as directed for the airplane flight upon taking off and landing. Do not continue to use Afrin as it will cause rebound congestion.  · Don't smoke. This can worsen symptoms.  Follow-up care  Follow up with your healthcare provider or our staff if you are not improving within the next week.    When to seek medical advice  Call your healthcare provider if any of these occur:  · Facial pain or headache becoming more severe  · Stiff neck  · Unusual drowsiness or confusion  · Swelling of the forehead or eyelids  · Vision problems, including blurred or double vision  · Fever of 100.4ºF (38ºC) or higher, or as directed by your healthcare provider  · Seizure  · Breathing problems  · Symptoms not resolving within 10 days          "

## 2019-07-18 ENCOUNTER — TELEPHONE (OUTPATIENT)
Dept: FAMILY MEDICINE | Facility: CLINIC | Age: 74
End: 2019-07-18

## 2019-07-18 NOTE — TELEPHONE ENCOUNTER
JAMEY. Spoke with patient who states she is feeling better but wanted Dr. Jeong to review her records from Urgent care.

## 2019-07-18 NOTE — TELEPHONE ENCOUNTER
----- Message from Isai Wilson sent at 7/18/2019 10:32 AM CDT -----  Contact: same  Patient called in and stated she went to the [facility] on 7/16/19 & just wanted to make sure someone in office reviewed the notes.    Patient call back number is [phone]

## 2019-07-19 ENCOUNTER — TELEPHONE (OUTPATIENT)
Dept: URGENT CARE | Facility: CLINIC | Age: 74
End: 2019-07-19

## 2019-07-19 NOTE — TELEPHONE ENCOUNTER
Patient feels a little better.Told patient to come back in to Clinic If she was  not feeling better by next week.

## 2019-07-23 DIAGNOSIS — I10 ESSENTIAL HYPERTENSION: Primary | ICD-10-CM

## 2019-07-24 NOTE — PROGRESS NOTES
Refill Authorization Note     is requesting a refill authorization.    Brief assessment and rationale for refill: APPROVE: prr  Name and strength of medication: AMLODIPINE BESYLATE 5MG TABLETS       Medication Therapy Plan: HTN LCO stable 4/19; approve 9 more    Medication reconciliation completed: No              How patient will take medication: t1t qd          Comments:   Blood Pressure Readings: <139/89mmHg (12 months) Heart Rate: > 50bpm (BB/NDHP-CCBS)   BP Readings from Last 3 Encounters:   07/16/19 121/69   04/22/19 120/72   01/22/19 110/68    Pulse Readings from Last 3 Encounters:   07/16/19 60   04/22/19 (!) 50   01/22/19 (!) 52        Digital Hypertension Data: (values will display if enrolled)  Last 5 Patient Entered Readings                                      Current 30 Day Average:      There is no flowsheet data to display.        APPOINTMENTS (past 12m or future 3m authorizing provider)  LAST VISIT DATE  Juan Francisco Jeong MD 4/22/2019         NEXT VISIT DATE  Juan Francisco Jeong MD 8/9/2019

## 2019-07-25 RX ORDER — AMLODIPINE BESYLATE 5 MG/1
TABLET ORAL
Qty: 90 TABLET | Refills: 2 | Status: SHIPPED | OUTPATIENT
Start: 2019-07-25 | End: 2020-04-20

## 2019-07-25 RX ORDER — CARVEDILOL 12.5 MG/1
TABLET ORAL
Qty: 180 TABLET | Refills: 2 | Status: SHIPPED | OUTPATIENT
Start: 2019-07-25 | End: 2020-05-04 | Stop reason: SDUPTHER

## 2019-07-25 NOTE — TELEPHONE ENCOUNTER
----- Message from Grazyna Wynn sent at 7/25/2019 11:22 AM CDT -----  Contact: self  Type: Needs Medical Advice    Who Called:  self  Best Call Back Number: [phone] (home)   Additional Information: Patient needs a refill on a Rx for Ambien an her pain medication Hydrocodone she would like a call back

## 2019-07-25 NOTE — TELEPHONE ENCOUNTER
----- Message from Grazyna Wynn sent at 7/25/2019 11:22 AM CDT -----  Contact: self  Type: Needs Medical Advice    Who Called:  self  Best Call Back Number: [phone] (home)   Additional Information: Patient needs a refill on a Rx for Ambien an her pain medication Hydrocodone she would like a call back

## 2019-07-25 NOTE — TELEPHONE ENCOUNTER
Please see request for refill   LOV 4/22/2019    Hydrocodone does not appear to me as a current medication, perhaps last administered in 2015?

## 2019-07-26 ENCOUNTER — PATIENT OUTREACH (OUTPATIENT)
Dept: ADMINISTRATIVE | Facility: HOSPITAL | Age: 74
End: 2019-07-26

## 2019-07-26 RX ORDER — ZOLPIDEM TARTRATE 10 MG/1
10 TABLET ORAL NIGHTLY
Qty: 30 TABLET | Refills: 0 | Status: SHIPPED | OUTPATIENT
Start: 2019-07-26 | End: 2019-09-17

## 2019-07-26 NOTE — PROGRESS NOTES
Health Maintenance Due   Topic Date Due    Complete Opioid Risk Tool  04/10/1963    Shingles Vaccine (2 of 3) 06/17/2015    DEXA SCAN  07/19/2019     Chart review completed 07/26/2019  Links down 07/26/2019

## 2019-08-09 ENCOUNTER — OFFICE VISIT (OUTPATIENT)
Dept: FAMILY MEDICINE | Facility: CLINIC | Age: 74
End: 2019-08-09
Payer: MEDICARE

## 2019-08-09 VITALS
BODY MASS INDEX: 22.39 KG/M2 | WEIGHT: 121.69 LBS | HEIGHT: 62 IN | SYSTOLIC BLOOD PRESSURE: 118 MMHG | OXYGEN SATURATION: 96 % | DIASTOLIC BLOOD PRESSURE: 74 MMHG | HEART RATE: 58 BPM

## 2019-08-09 DIAGNOSIS — G47.00 INSOMNIA, UNSPECIFIED TYPE: ICD-10-CM

## 2019-08-09 DIAGNOSIS — J31.0 CHRONIC RHINITIS: ICD-10-CM

## 2019-08-09 DIAGNOSIS — E78.5 HYPERLIPIDEMIA, UNSPECIFIED HYPERLIPIDEMIA TYPE: Primary | ICD-10-CM

## 2019-08-09 DIAGNOSIS — J44.9 CHRONIC OBSTRUCTIVE PULMONARY DISEASE, UNSPECIFIED COPD TYPE: ICD-10-CM

## 2019-08-09 DIAGNOSIS — Z79.891 CHRONIC USE OF OPIATE DRUGS THERAPEUTIC PURPOSES: ICD-10-CM

## 2019-08-09 DIAGNOSIS — M47.812 CERVICAL SPONDYLOSIS WITHOUT MYELOPATHY: ICD-10-CM

## 2019-08-09 DIAGNOSIS — I10 ESSENTIAL HYPERTENSION: ICD-10-CM

## 2019-08-09 DIAGNOSIS — M54.16 LUMBAR RADICULOPATHY: ICD-10-CM

## 2019-08-09 DIAGNOSIS — F41.9 ANXIETY: ICD-10-CM

## 2019-08-09 DIAGNOSIS — K21.9 GASTROESOPHAGEAL REFLUX DISEASE, ESOPHAGITIS PRESENCE NOT SPECIFIED: ICD-10-CM

## 2019-08-09 PROCEDURE — 99999 PR PBB SHADOW E&M-EST. PATIENT-LVL III: CPT | Mod: PBBFAC,,, | Performed by: FAMILY MEDICINE

## 2019-08-09 PROCEDURE — 1101F PR PT FALLS ASSESS DOC 0-1 FALLS W/OUT INJ PAST YR: ICD-10-PCS | Mod: CPTII,S$GLB,, | Performed by: FAMILY MEDICINE

## 2019-08-09 PROCEDURE — 99214 PR OFFICE/OUTPT VISIT, EST, LEVL IV, 30-39 MIN: ICD-10-PCS | Mod: S$GLB,,, | Performed by: FAMILY MEDICINE

## 2019-08-09 PROCEDURE — 3078F PR MOST RECENT DIASTOLIC BLOOD PRESSURE < 80 MM HG: ICD-10-PCS | Mod: CPTII,S$GLB,, | Performed by: FAMILY MEDICINE

## 2019-08-09 PROCEDURE — 99499 RISK ADDL DX/OHS AUDIT: ICD-10-PCS | Mod: S$GLB,,, | Performed by: FAMILY MEDICINE

## 2019-08-09 PROCEDURE — 1101F PT FALLS ASSESS-DOCD LE1/YR: CPT | Mod: CPTII,S$GLB,, | Performed by: FAMILY MEDICINE

## 2019-08-09 PROCEDURE — 3078F DIAST BP <80 MM HG: CPT | Mod: CPTII,S$GLB,, | Performed by: FAMILY MEDICINE

## 2019-08-09 PROCEDURE — 99214 OFFICE O/P EST MOD 30 MIN: CPT | Mod: S$GLB,,, | Performed by: FAMILY MEDICINE

## 2019-08-09 PROCEDURE — 99499 UNLISTED E&M SERVICE: CPT | Mod: S$GLB,,, | Performed by: FAMILY MEDICINE

## 2019-08-09 PROCEDURE — 99999 PR PBB SHADOW E&M-EST. PATIENT-LVL III: ICD-10-PCS | Mod: PBBFAC,,, | Performed by: FAMILY MEDICINE

## 2019-08-09 PROCEDURE — 3074F PR MOST RECENT SYSTOLIC BLOOD PRESSURE < 130 MM HG: ICD-10-PCS | Mod: CPTII,S$GLB,, | Performed by: FAMILY MEDICINE

## 2019-08-09 PROCEDURE — 3074F SYST BP LT 130 MM HG: CPT | Mod: CPTII,S$GLB,, | Performed by: FAMILY MEDICINE

## 2019-08-09 RX ORDER — HYDROCODONE BITARTRATE AND ACETAMINOPHEN 5; 325 MG/1; MG/1
1 TABLET ORAL 2 TIMES DAILY PRN
Qty: 60 TABLET | Refills: 0 | Status: SHIPPED | OUTPATIENT
Start: 2019-08-09 | End: 2019-09-08

## 2019-08-09 RX ORDER — ALBUTEROL SULFATE 90 UG/1
AEROSOL, METERED RESPIRATORY (INHALATION)
Qty: 18 G | Refills: 11 | Status: SHIPPED | OUTPATIENT
Start: 2019-08-09 | End: 2020-08-17

## 2019-08-09 RX ORDER — HYDROCODONE BITARTRATE AND ACETAMINOPHEN 5; 325 MG/1; MG/1
1 TABLET ORAL 2 TIMES DAILY PRN
Qty: 60 TABLET | Refills: 0 | Status: SHIPPED | OUTPATIENT
Start: 2019-09-08 | End: 2019-09-11 | Stop reason: SDUPTHER

## 2019-08-09 RX ORDER — AZELASTINE 1 MG/ML
SPRAY, METERED NASAL
Qty: 30 ML | Refills: 11 | Status: SHIPPED | OUTPATIENT
Start: 2019-08-09 | End: 2020-10-27

## 2019-08-09 RX ORDER — HYDROCODONE BITARTRATE AND ACETAMINOPHEN 5; 325 MG/1; MG/1
1 TABLET ORAL 2 TIMES DAILY PRN
Qty: 60 TABLET | Refills: 0 | Status: SHIPPED | OUTPATIENT
Start: 2019-10-08 | End: 2019-11-08 | Stop reason: SDUPTHER

## 2019-08-09 RX ORDER — ONDANSETRON 4 MG/1
TABLET, ORALLY DISINTEGRATING ORAL
Refills: 0 | COMMUNITY
Start: 2019-05-14 | End: 2023-11-09 | Stop reason: CLARIF

## 2019-08-09 NOTE — PROGRESS NOTES
Subjective:       Patient ID: Luis Miguel Murcia is a 74 y.o. female.    Chief Complaint: Hypertension    HPI     Here for a f/u.     htn is stable. No c/o cp or sob at rest or exertion. No headaches.      C/o chronic right lumbar radicular pain > 1 year. Ps: 2-3/10 while on norco. Reports mild relief from poppy L5-S1 in 7/5/2018. Has residual constant neuropathy from right calf to right foot which is worse at night.  Takes gabapentin 300 mg qhs for the neuropathy.      Also, has chronic neck pain > 1 year. Ps: 2-3/10 while on norco       Imaging:  C-spine MRI 9/30/10: There is anterolisthesis of C3 on 4 and 4 on 5 mile, retrolisthesis of C5 and 6. At C3/4 there is a mild bulge with foraminal narrowing bilaterally mild. At C5-C6 there is a disc bulge with right upper protrusion of the disc but severe foraminal narrowing on the right and moderate to severe on the left. There is disc bulging at C6/7 with a bulge more to the right.     MRI L-spine 5/24/2018: There is multilevel degenerative change discussed in detail above.  There is no fracture.  There is trace anterolisthesis of L4 on L5 which is degenerative in etiology and unchanged.  All of the discs are desiccated.  There is some degree of disc space narrowing, disc bulge with a without disc protrusions, facet joint arthropathy contributing to stenosis.  These findings will be summarized below. There is degenerative change but there is no spinal canal or significant foraminal stenosis at the T10-11, T11-12, T12-L1 or L1-2 levels. At the L2-3 level, there is mild spinal canal and left foraminal stenosis.  There is facet joint arthropathy.  There is a disc bulge with small left paracentral disc protrusion.  The disc protrusion has occurred since the prior study in the facet joint changes have progressed. At the L3-4 level, there is mild spinal stenosis with moderate left lateral recess stenosis.  Additionally, there is mild-to-moderate left and mild right  foraminal stenosis without definite change. At the L4-5 level, there is multifactorial moderate central spinal stenosis without any significant foraminal stenosis and without definite change. At the L5-S1 level, there is right greater than left facet joint arthropathy.  There is a disc bulge with osteophytic ridging and broad central disc protrusion but there is no significant spinal stenosis.  There is stable mild left and moderate-to-marked right foraminal stenosis.Anxiety stable while taking xanax.     Copd stable. No worsening cough or wheeze.     Insomnia stable while on ambien.      Anxiety stable while on xanax.    gerd stable while on dexilant and zantac.      Has chronic rhinitis. Taking astelin and flonase.              Review of Systems      Review of Systems   Constitutional: Negative for fever and chills.   HENT: Negative for hearing loss and neck stiffness.    Eyes: Negative for redness and itching.   Respiratory: Negative for cough and choking.    Cardiovascular: Negative for chest pain and leg swelling.  Abdomen: Negative for abdominal pain and blood in stool.   Genitourinary: Negative for dysuria and flank pain.   Musculoskeletal: Negative for gait problem.   Neurological: Negative for light-headedness and headaches.   Hematological: Negative for adenopathy.   Psychiatric/Behavioral: Negative for behavioral problems.     Objective:      Physical Exam   Constitutional: She appears well-developed.   HENT:   Head: Normocephalic and atraumatic.   Eyes: Pupils are equal, round, and reactive to light. Conjunctivae are normal.   Neck: Normal range of motion.   Cardiovascular: Normal rate and regular rhythm.   No murmur heard.  Pulmonary/Chest: Effort normal and breath sounds normal.   Lymphadenopathy:     She has no cervical adenopathy.       Assessment:       1. Hyperlipidemia, unspecified hyperlipidemia type    2. Essential hypertension    3. Lumbar radiculopathy    4. Anxiety    5. Insomnia, unspecified  type    6. Cervical spondylosis without myelopathy    7. Chronic obstructive pulmonary disease, unspecified COPD type    8. Chronic rhinitis    9. Gastroesophageal reflux disease, esophagitis presence not specified    10. Chronic use of opiate drugs therapeutic purposes        Plan:       Hyperlipidemia, unspecified hyperlipidemia type  -     Comprehensive metabolic panel; Future; Expected date: 11/09/2019  -     Lipid panel; Future; Expected date: 11/09/2019    Essential hypertension    Lumbar radiculopathy    Anxiety    Insomnia, unspecified type    Cervical spondylosis without myelopathy    Chronic obstructive pulmonary disease, unspecified COPD type    Chronic rhinitis    Gastroesophageal reflux disease, esophagitis presence not specified    Chronic use of opiate drugs therapeutic purposes    Other orders  -     HYDROcodone-acetaminophen (NORCO) 5-325 mg per tablet; Take 1 tablet by mouth 2 (two) times daily as needed for Pain.  Dispense: 60 tablet; Refill: 0  -     HYDROcodone-acetaminophen (NORCO) 5-325 mg per tablet; Take 1 tablet by mouth 2 (two) times daily as needed for Pain.  Dispense: 60 tablet; Refill: 0  -     HYDROcodone-acetaminophen (NORCO) 5-325 mg per tablet; Take 1 tablet by mouth 2 (two) times daily as needed for Pain.  Dispense: 60 tablet; Refill: 0  -     azelastine (ASTELIN) 137 mcg (0.1 %) nasal spray; USE 2 SPRAYS IN EACH NOSTRIL EVERY DAY. MAY USE UP TO 2 TIMES DAILY DEPENDING ON SYMPTOMS  Dispense: 30 mL; Refill: 11  -     albuterol (VENTOLIN HFA) 90 mcg/actuation inhaler; INHALE 2 PUFFS INTO THE LUNGS EVERY 6 HOURS AS NEEDED FOR WHEEZING  Dispense: 18 g; Refill: 11              Plan:  See orders  Cont current meds      Medication List with Changes/Refills   New Medications    HYDROCODONE-ACETAMINOPHEN (NORCO) 5-325 MG PER TABLET    Take 1 tablet by mouth 2 (two) times daily as needed for Pain.    HYDROCODONE-ACETAMINOPHEN (NORCO) 5-325 MG PER TABLET    Take 1 tablet by mouth 2 (two)  times daily as needed for Pain.    HYDROCODONE-ACETAMINOPHEN (NORCO) 5-325 MG PER TABLET    Take 1 tablet by mouth 2 (two) times daily as needed for Pain.   Current Medications    ALPRAZOLAM (XANAX) 0.25 MG TABLET    TAKE 1 TABLET BY MOUTH TWICE DAILY AS NEEDED FOR ANXIETY    AMLODIPINE (NORVASC) 5 MG TABLET    TAKE 1 TABLET(5 MG) BY MOUTH EVERY DAY    ASCORBATE CALCIUM (VITAMIN C ORAL)    Take 1,000 mg by mouth once daily.     ASPIRIN (ECOTRIN) 81 MG EC TABLET    Take 81 mg by mouth once daily.    B COMPLEX VITAMINS TABLET    Take 1 tablet by mouth once daily.      CALCIUM CITRATE-VITAMIN D3 ORAL        CARVEDILOL (COREG) 12.5 MG TABLET    TAKE 1 TABLET(12.5 MG) BY MOUTH TWICE DAILY WITH MEALS    CO-ENZYME Q-10 30 MG CAPSULE        DEXILANT 60 MG CAPSULE    TAKE 1 CAPSULE(60 MG) BY MOUTH EVERY MORNING BEFORE BREAKFAST    FLUTICASONE PROPIONATE (FLONASE) 50 MCG/ACTUATION NASAL SPRAY    SHAKE LIQUID AND USE 2 SPRAYS(100 MCG) IN EACH NOSTRIL EVERY DAY    GABAPENTIN (NEURONTIN) 600 MG TABLET    Take 1 tablet (600 mg total) by mouth every evening.    ONDANSETRON (ZOFRAN-ODT) 4 MG TBDL    DIS 1 T UNT Q 4 H PRN N    PRAVASTATIN (PRAVACHOL) 20 MG TABLET    TAKE 1 TABLET(20 MG) BY MOUTH EVERY NIGHT    RANITIDINE (ZANTAC) 150 MG TABLET    TAKE 1 TABLET(150 MG) BY MOUTH EVERY NIGHT    TRIAMCINOLONE ACETONIDE 0.1% (KENALOG) 0.1 % CREAM    Apply topically 2 (two) times daily.    VITAMIN B-12 1000 MCG TABLET    Take 1,000 mcg by mouth once daily.     ZOLPIDEM (AMBIEN) 10 MG TAB    Take 1 tablet (10 mg total) by mouth nightly.   Changed and/or Refilled Medications    Modified Medication Previous Medication    ALBUTEROL (VENTOLIN HFA) 90 MCG/ACTUATION INHALER VENTOLIN HFA 90 mcg/actuation inhaler       INHALE 2 PUFFS INTO THE LUNGS EVERY 6 HOURS AS NEEDED FOR WHEEZING    INHALE 2 PUFFS INTO THE LUNGS EVERY 6 HOURS AS NEEDED FOR WHEEZING    AZELASTINE (ASTELIN) 137 MCG (0.1 %) NASAL SPRAY azelastine (ASTELIN) 137 mcg (0.1 %) nasal  spray       USE 2 SPRAYS IN EACH NOSTRIL EVERY DAY. MAY USE UP TO 2 TIMES DAILY DEPENDING ON SYMPTOMS    USE 2 SPRAYS IN EACH NOSTRIL EVERY DAY. MAY USE UP TO 2 TIMES DAILY DEPENDING ON SYMPTOMS

## 2019-08-23 RX ORDER — ZOLPIDEM TARTRATE 10 MG/1
TABLET ORAL
Qty: 30 TABLET | Refills: 0 | Status: CANCELLED | OUTPATIENT
Start: 2019-08-23

## 2019-08-23 NOTE — TELEPHONE ENCOUNTER
Please see the following assessment. This refill request still requires some action on your part. Zolpidem is outside of our protocol. Routing to you. Thank you.

## 2019-08-23 NOTE — PROGRESS NOTES
Refill Authorization Note     is requesting a refill authorization.    Brief assessment and rationale for refill: ROUTE: op  Name and strength of medication: ZOLPIDEM 10MG TABLETS       Medication Therapy Plan: LCO(8/19) stable while on Ambien; controlled medication outside of protocol; Route to youi                   How patient will take medication: t1t po qpm          APPOINTMENTS (past 12m or future 3m authorizing provider)   Date Provider   LAST VISIT  8/9/2019 Juan Francisco Jeong MD   NEXT VISIT  11/8/2019 Juan Francisco Jeong MD

## 2019-08-26 RX ORDER — ZOLPIDEM TARTRATE 10 MG/1
TABLET ORAL
Qty: 30 TABLET | Refills: 5 | Status: SHIPPED | OUTPATIENT
Start: 2019-08-26 | End: 2020-02-14 | Stop reason: SDUPTHER

## 2019-09-04 DIAGNOSIS — K21.9 LPRD (LARYNGOPHARYNGEAL REFLUX DISEASE): ICD-10-CM

## 2019-09-04 NOTE — PROGRESS NOTES
Refill Authorization Note     is requesting a refill authorization.    Brief assessment and rationale for refill: APPROVE: prr  Name and strength of medication: RANITIDINE 150MG TABLETS       Medication Therapy Plan: Gerd LCO stable 8/19; Labs WNL 10/18; approve 3 more    Medication reconciliation completed: No              How patient will take medication: t1t qd          Comments: .  Last Kidney: 12 months    Lab Results   Component Value Date    CREATININE 0.8 10/08/2018    CREATININE 0.9 07/31/2017    CREATININE 0.9 07/19/2016       Lab Results   Component Value Date    K 4.5 10/08/2018     10/08/2018     10/08/2018    BUN 10 10/08/2018    CO2 26 10/08/2018         APPOINTMENTS(past 12m or future 3m authorizing provider)    Date Provider   LAST VISIT  8/9/2019 Juan Francisco Jeong MD   NEXT VISIT  11/8/2019 Juan Francisco Jeong MD

## 2019-09-12 RX ORDER — HYDROCODONE BITARTRATE AND ACETAMINOPHEN 5; 325 MG/1; MG/1
1 TABLET ORAL 2 TIMES DAILY PRN
Qty: 60 TABLET | Refills: 0 | Status: SHIPPED | OUTPATIENT
Start: 2019-09-12 | End: 2019-10-12

## 2019-09-17 ENCOUNTER — OFFICE VISIT (OUTPATIENT)
Dept: GASTROENTEROLOGY | Facility: CLINIC | Age: 74
End: 2019-09-17
Payer: MEDICARE

## 2019-09-17 VITALS — WEIGHT: 120.81 LBS | BODY MASS INDEX: 22.23 KG/M2 | RESPIRATION RATE: 18 BRPM | HEIGHT: 62 IN

## 2019-09-17 DIAGNOSIS — Z79.01 ANTICOAGULANT LONG-TERM USE: ICD-10-CM

## 2019-09-17 DIAGNOSIS — F11.90 OPIOID USE: ICD-10-CM

## 2019-09-17 DIAGNOSIS — R10.84 GENERALIZED ABDOMINAL PAIN: ICD-10-CM

## 2019-09-17 DIAGNOSIS — R09.A2 GLOBUS SENSATION: ICD-10-CM

## 2019-09-17 DIAGNOSIS — R12 HEARTBURN: Primary | ICD-10-CM

## 2019-09-17 DIAGNOSIS — R05.9 COUGH: ICD-10-CM

## 2019-09-17 DIAGNOSIS — R12 HEARTBURN: ICD-10-CM

## 2019-09-17 DIAGNOSIS — Z87.891 FORMER SMOKER: ICD-10-CM

## 2019-09-17 DIAGNOSIS — K59.09 INTERMITTENT CONSTIPATION: ICD-10-CM

## 2019-09-17 DIAGNOSIS — Z87.09 HISTORY OF COPD: ICD-10-CM

## 2019-09-17 PROCEDURE — 99999 PR PBB SHADOW E&M-EST. PATIENT-LVL V: CPT | Mod: PBBFAC,,, | Performed by: NURSE PRACTITIONER

## 2019-09-17 PROCEDURE — 1101F PT FALLS ASSESS-DOCD LE1/YR: CPT | Mod: CPTII,S$GLB,, | Performed by: NURSE PRACTITIONER

## 2019-09-17 PROCEDURE — 1101F PR PT FALLS ASSESS DOC 0-1 FALLS W/OUT INJ PAST YR: ICD-10-PCS | Mod: CPTII,S$GLB,, | Performed by: NURSE PRACTITIONER

## 2019-09-17 PROCEDURE — 99214 PR OFFICE/OUTPT VISIT, EST, LEVL IV, 30-39 MIN: ICD-10-PCS | Mod: S$GLB,,, | Performed by: NURSE PRACTITIONER

## 2019-09-17 PROCEDURE — 99999 PR PBB SHADOW E&M-EST. PATIENT-LVL V: ICD-10-PCS | Mod: PBBFAC,,, | Performed by: NURSE PRACTITIONER

## 2019-09-17 PROCEDURE — 99214 OFFICE O/P EST MOD 30 MIN: CPT | Mod: S$GLB,,, | Performed by: NURSE PRACTITIONER

## 2019-09-17 RX ORDER — PSYLLIUM HUSK 0.4 G
CAPSULE ORAL DAILY PRN
COMMUNITY
End: 2020-02-14

## 2019-09-17 RX ORDER — FAMOTIDINE 20 MG/1
20 TABLET, FILM COATED ORAL 2 TIMES DAILY
Qty: 60 TABLET | Refills: 1 | Status: SHIPPED | OUTPATIENT
Start: 2019-09-17 | End: 2019-09-17 | Stop reason: SDUPTHER

## 2019-09-17 NOTE — LETTER
September 23, 2019      Hanane Pickard, ROBERT  [address] [facility] [address] [place] [address]           [facility]  [address] [place] [address]  Phone: [phone]          Patient: Luis Miguel Murcia   MR Number: [id number]   YOB: 1945   Date of Visit: 9/17/2019       Dear Hanane Pickard:    Thank you for referring Luis Miguel Murcia to me for evaluation. Attached you will find relevant portions of my assessment and plan of care.    If you have questions, please do not hesitate to call me. I look forward to following Luis Miguel Murcia along with you.    Sincerely,    Rhiannon Pérez  CC:  No Recipients    If you would like to receive this communication electronically, please contact [email or web address] or [phone] to request more information on [employer] Link access.    For providers and/or their staff who would like to refer a patient to [facility], please contact us through our one-stop-shop provider referral line, [facility], at [phone].    If you feel you have received this communication in error or would no longer like to receive these types of communications, please e-mail [email or web address]

## 2019-09-17 NOTE — PATIENT INSTRUCTIONS
"GERD (Adult)    The esophagus is a tube that carries food from the mouth to the stomach. A valve at the lower end of the esophagus prevents stomach acid from flowing upward. When this valve doesn't work properly, stomach contents may repeatedly flow back up (reflux) into the esophagus. This is called gastroesophageal reflux disease (GERD). GERD can irritate the esophagus. It can cause problems with swallowing or breathing. In severe cases, GERD can cause recurrent pneumonia or other serious problems.  Symptoms of reflux include burning, pressure or sharp pain in the upper abdomen or mid to lower chest. The pain can spread to the neck, back, or shoulder. There may be belching, an acid taste in the back of the throat, chronic cough, or sore throat or hoarseness. GERD symptoms often occur during the day after a big meal. They can also occur at night when lying down.   Home care  Lifestyle changes can help reduce symptoms. If needed, medicines may be prescribed. Symptoms often improve with treatment, but if treatment is stopped, the symptoms often return after a few months. So most persons with GERD will need to continue treatment.  Lifestyle changes  · Limit or avoid fatty, fried, and spicy foods, as well as coffee, chocolate, mint, and foods with high acid content such as tomatoes and citrus fruit and juices (orange, grapefruit, lemon).  · Dont eat large meals, especially at night. Frequent, smaller meals are best. Do not lie down right after eating. And dont eat anything 3 hours before going to bed.  · Avoid drinking alcohol and smoking. As much as possible, stay away from second hand smoke.  · If you are overweight, losing weight will reduce symptoms.   · Avoid wearing tight clothing around your stomach area.  · If your symptoms occur during sleep, use a foam wedge to elevate your upper body (not just your head.) Or, place 4" blocks under the head of your bed.  Medicines  If needed, medicines can help relieve the " symptoms of GERD and prevent damage to the esophagus. Discuss a medicine plan with your healthcare provider. This may include one or more of the following medicines:  · Antacids to help neutralize the normal acids in your stomach.  · Acid blockers (H2 blockers) to decrease acid production.  · Acid inhibitors (PPIs) to decrease acid production in a different way than the blockers. They may work better, but can take a little longer to take effect.  Take an antacid 30-60 minutes after eating and at bedtime, but not at the same time as an acid blocker.  Try not to take medicines such as ibuprofen and aspirin. If you are taking aspirin for your heart or other medical reasons, talk to your healthcare provider about stopping it.  Follow-up care  Follow up with your healthcare provider or as advised by our staff.  When to seek medical advice  Call your healthcare provider if any of the following occur:  · Stomach pain gets worse or moves to the lower right abdomen (appendix area)  · Chest pain appears or gets worse, or spreads to the back, neck, shoulder, or arm  · Frequent vomiting (cant keep down liquids)  · Blood in the stool or vomit (red or black in color)  · Feeling weak or dizzy  · Fever of 100.4ºF (38ºC) or higher, or as directed by your healthcare provider  Date Last Reviewed: 6/23/2015 © 2000-2017 [employer]. [address] [place] [address], [place] [address]. All rights reserved. This information is not intended as a substitute for professional medical care. Always follow your healthcare professional's instructions.            Abdominal Pain    Abdominal pain is pain in the stomach or belly area. Everyone has this pain from time to time. In many cases it goes away on its own. But abdominal pain can sometimes be due to a serious problem, such as appendicitis. So its important to know when to seek help.  Causes of abdominal pain  There are many possible causes of abdominal pain. Common causes in adults  include:  · Constipation, diarrhea, or gas  · Stomach acid flowing back up into the esophagus (acid reflux or heartburn)  · Severe acid reflux, called GERD (gastroesophageal reflux disease)  · A sore in the lining of the stomach or small intestine (peptic ulcer)  · Inflammation of the gallbladder, liver, or pancreas  · Gallstones or kidney stones  · Appendicitis   · Intestinal blockage   · An internal organ pushing through a muscle or other tissue (hernia)  · Urinary tract infections  · In women, menstrual cramps, fibroids, or endometriosis  · Inflammation or infection of the intestines  Diagnosing the cause of abdominal pain  Your healthcare provider will do a physical exam help find the cause of your pain. If needed, tests will be ordered. Belly pain has many possible causes. So it can be hard to find the reason for your pain. Giving details about your pain can help. Tell your provider where and when you feel the pain, and what makes it better or worse. Also let your provider know if you have other symptoms such as:  · Fever  · Tiredness  · Upset stomach (nausea)  · Vomiting  · Changes in bathroom habits  Treating abdominal pain  Some causes of pain need emergency medical treatment right away. These include appendicitis or a bowel blockage. Other problems can be treated with rest, fluids, or medicines. Your healthcare provider can give you specific instructions for treatment or self-care based on what is causing your pain.  If you have vomiting or diarrhea, sip water or other clear fluids. When you are ready to eat solid foods again, start with small amounts of easy-to-digest, low-fat foods. These include apple sauce, toast, or crackers.   When to seek medical care  Call 911 or go to the hospital right away if you:  · Cant pass stool and are vomiting  · Are vomiting blood or have bloody diarrhea or black, tarry diarrhea  · Have chest, neck, or shoulder pain  · Feel like you might pass out  · Have pain in your  shoulder blades with nausea  · Have sudden, severe belly pain  · Have new, severe pain unlike any you have felt before  · Have a belly that is rigid, hard, and tender to touch  Call your healthcare provider if you have:  · Pain for more than 5 days  · Bloating for more than 2 days  · Diarrhea for more than 5 days  · A fever of 100.4°F (38.0°C) or higher, or as directed by your provider  · Pain that gets worse  · Weight loss for no reason  · Continued lack of appetite  · Blood in your stool  How to prevent abdominal pain  Here are some tips to help prevent abdominal pain:  · Eat smaller amounts of food at one time.  · Avoid greasy, fried, or other high-fat foods.  · Avoid foods that give you gas.  · Exercise regularly.  · Drink plenty of fluids.  To help prevent GERD symptoms:  · Quit smoking.  · Reduce alcohol and certain foods that increase stomach acid.  · Avoid aspirin and over-the-counter pain and fever medicines (NSAIDS or nonsteroidal anti-inflammatory drugs), if possible  · Lose extra weight.  · Finish eating at least 2 hours before you go to bed or lie down.  · Raise the head of your bed.  Date Last Reviewed: 7/1/2016  © 2967-6176 [employer]. [address] [place] [address], [place] [address]. All rights reserved. This information is not intended as a substitute for professional medical care. Always follow your healthcare professional's instructions.
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
## 2019-09-19 RX ORDER — FAMOTIDINE 20 MG/1
TABLET, FILM COATED ORAL
Qty: 180 TABLET | Refills: 1 | Status: SHIPPED | OUTPATIENT
Start: 2019-09-19 | End: 2020-04-22

## 2019-09-24 ENCOUNTER — HOSPITAL ENCOUNTER (OUTPATIENT)
Dept: RADIOLOGY | Facility: HOSPITAL | Age: 74
Discharge: HOME | End: 2019-09-24
Attending: NURSE PRACTITIONER
Payer: MEDICARE

## 2019-09-24 DIAGNOSIS — R10.84 GENERALIZED ABDOMINAL PAIN: ICD-10-CM

## 2019-09-24 DIAGNOSIS — Z87.891 FORMER SMOKER: ICD-10-CM

## 2019-09-24 DIAGNOSIS — R05.9 COUGH: ICD-10-CM

## 2019-09-24 DIAGNOSIS — Z87.09 HISTORY OF COPD: ICD-10-CM

## 2019-09-24 PROCEDURE — 71260 CT CHEST ABDOMEN PELVIS WITH CONTRAST (XPD): ICD-10-PCS | Mod: 26,,, | Performed by: RADIOLOGY

## 2019-09-24 PROCEDURE — 74177 CT ABD & PELVIS W/CONTRAST: CPT | Mod: TC,PO

## 2019-09-24 PROCEDURE — 74177 CT ABD & PELVIS W/CONTRAST: CPT | Mod: 26,,, | Performed by: RADIOLOGY

## 2019-09-24 PROCEDURE — 71260 CT THORAX DX C+: CPT | Mod: 26,,, | Performed by: RADIOLOGY

## 2019-09-24 PROCEDURE — 25500020 PHARM REV CODE 255: Mod: PO | Performed by: NURSE PRACTITIONER

## 2019-09-24 PROCEDURE — 74177 CT CHEST ABDOMEN PELVIS WITH CONTRAST (XPD): ICD-10-PCS | Mod: 26,,, | Performed by: RADIOLOGY

## 2019-09-24 RX ADMIN — IOHEXOL 30 ML: 350 INJECTION, SOLUTION INTRAVENOUS at 12:09

## 2019-09-24 RX ADMIN — IOHEXOL 75 ML: 350 INJECTION, SOLUTION INTRAVENOUS at 12:09

## 2019-09-26 ENCOUNTER — TELEPHONE (OUTPATIENT)
Dept: GASTROENTEROLOGY | Facility: CLINIC | Age: 74
End: 2019-09-26

## 2019-09-26 NOTE — TELEPHONE ENCOUNTER
Please call to inform & review the results with the patient- radiology report of the ct chest abdomen pelvis showed overall no acute findings of the GI tract/GI organs.  Atherosclerosis is noted to the blood vessels (plaque build up in blood vessels): Recommend follow-up with Primary Care Provider for continued evaluation and management of this finding. Otherwise, unremarkable findings.  Blood work showed normal pancreatic enzyme, mild anemia noted which is similar to prior blood work: Recommend follow-up with Primary Care Provider for continued evaluation and management of anemia.  Kidney function levels showed normal creatinine level and mild decrease in GFR level: Recommend to stay well-hydrated and follow-up with Primary Care Provider for continued evaluation and management of this finding.    Continue with previous recommendations. If no improvement in symptoms or symptoms worsen, call/follow-up at clinic or go to ER.  Please release results to patient's mychart once you have discussed results and recommendations with patient.  Thanks,

## 2019-09-30 ENCOUNTER — TELEPHONE (OUTPATIENT)
Dept: GASTROENTEROLOGY | Facility: CLINIC | Age: 74
End: 2019-09-30

## 2019-09-30 NOTE — TELEPHONE ENCOUNTER
Spoke with pt and informed of results and recommendations per Tamika Schaefer NP. Pt verbalized understanding.

## 2019-09-30 NOTE — TELEPHONE ENCOUNTER
----- Message from Cristina Crain sent at 9/30/2019  1:35 PM CDT -----  Type:  Patient Returning Call    Who Called:  Patient  Who Left Message for Patient:  Donita  Does the patient know what this is regarding?:  preparations for procedure  Best Call Back Number:  [phone] (home)

## 2019-10-05 DIAGNOSIS — F41.9 ANXIETY: ICD-10-CM

## 2019-10-07 ENCOUNTER — TELEPHONE (OUTPATIENT)
Dept: SURGERY | Facility: HOSPITAL | Age: 74
End: 2019-10-07

## 2019-10-07 ENCOUNTER — TELEPHONE (OUTPATIENT)
Dept: GASTROENTEROLOGY | Facility: CLINIC | Age: 74
End: 2019-10-07

## 2019-10-07 NOTE — PROGRESS NOTES
Refill Authorization Note     is requesting a refill authorization.    Brief assessment and rationale for refill: ROUTE: op  Name and strength of medication: ALPRAZOLAM 0.25MG TABLETS       Medication Therapy Plan: LCO/LOV-Anxiety stable while on xanax(8/19); medication outside of protocol; route to you     Medication reconciliation completed: No              How patient will take medication: t1t po bid as needed for anxiety           Comments:   Appointments past 12m or future 3m    Date Provider   Last Visit   8/9/2019 Juan Francisco Jeong MD   Next Visit   11/8/2019 Juan Francisco Jeong MD

## 2019-10-07 NOTE — TELEPHONE ENCOUNTER
----- Message from Sherie Marrero sent at 10/7/2019  9:10 AM CDT -----  Contact: patient  Type: Needs Medical Advice    Who Called:  Patient   Symptoms (please be specific):  na  How long has patient had these symptoms:  amrit  Pharmacy name and phone #:  amrit  Best Call Back Number: [phone]  Additional Information: Patient states she is cancelling procedure tomorrow.  Patient will call back when she is ready to reschedule. Thanks!

## 2019-10-08 RX ORDER — ALPRAZOLAM 0.25 MG/1
TABLET ORAL
Qty: 60 TABLET | Refills: 2 | Status: SHIPPED | OUTPATIENT
Start: 2019-10-08 | End: 2020-01-22

## 2019-10-08 NOTE — TELEPHONE ENCOUNTER
Please see the following assessment. This refill request still requires some action on your part. Alprazolam is outside of our protocol. Route to you. Thank you.

## 2019-10-10 NOTE — TELEPHONE ENCOUNTER
During preop call today, patient notified ELVA Perez RN that she left a message earlier today to reschedule procedure. Please call patient to reschedule procedure as indicated.    36.4

## 2019-10-16 ENCOUNTER — TELEPHONE (OUTPATIENT)
Dept: GASTROENTEROLOGY | Facility: CLINIC | Age: 74
End: 2019-10-16

## 2019-10-16 NOTE — TELEPHONE ENCOUNTER
----- Message from Vickie Randolph sent at 10/16/2019  2:14 PM CDT -----  Contact: self [phone]  She wants to explain why she cancelled the EGD. She has a fixed dental plate in the front she can't get it fixed until after 1/1/20 when her insurance picks up again.  So when it is fixed she will call to reschedule.  Thank you!

## 2019-10-22 ENCOUNTER — OFFICE VISIT (OUTPATIENT)
Dept: DERMATOLOGY | Facility: CLINIC | Age: 74
End: 2019-10-22
Payer: MEDICARE

## 2019-10-22 VITALS — HEIGHT: 62 IN | WEIGHT: 120.81 LBS | RESPIRATION RATE: 18 BRPM | BODY MASS INDEX: 22.23 KG/M2

## 2019-10-22 DIAGNOSIS — L57.0 ACTINIC KERATOSIS: ICD-10-CM

## 2019-10-22 DIAGNOSIS — D69.2 PURPURA: Primary | ICD-10-CM

## 2019-10-22 PROCEDURE — 1101F PR PT FALLS ASSESS DOC 0-1 FALLS W/OUT INJ PAST YR: ICD-10-PCS | Mod: CPTII,S$GLB,, | Performed by: DERMATOLOGY

## 2019-10-22 PROCEDURE — 99999 PR PBB SHADOW E&M-EST. PATIENT-LVL III: ICD-10-PCS | Mod: PBBFAC,,, | Performed by: DERMATOLOGY

## 2019-10-22 PROCEDURE — 99499 RISK ADDL DX/OHS AUDIT: ICD-10-PCS | Mod: S$GLB,,, | Performed by: DERMATOLOGY

## 2019-10-22 PROCEDURE — 1101F PT FALLS ASSESS-DOCD LE1/YR: CPT | Mod: CPTII,S$GLB,, | Performed by: DERMATOLOGY

## 2019-10-22 PROCEDURE — 17000 DESTRUCT PREMALG LESION: CPT | Mod: S$GLB,,, | Performed by: DERMATOLOGY

## 2019-10-22 PROCEDURE — 99213 PR OFFICE/OUTPT VISIT, EST, LEVL III, 20-29 MIN: ICD-10-PCS | Mod: 25,S$GLB,, | Performed by: DERMATOLOGY

## 2019-10-22 PROCEDURE — 17000 PR DESTRUCTION(LASER SURGERY,CRYOSURGERY,CHEMOSURGERY),PREMALIGNANT LESIONS,FIRST LESION: ICD-10-PCS | Mod: S$GLB,,, | Performed by: DERMATOLOGY

## 2019-10-22 PROCEDURE — 99999 PR PBB SHADOW E&M-EST. PATIENT-LVL III: CPT | Mod: PBBFAC,,, | Performed by: DERMATOLOGY

## 2019-10-22 PROCEDURE — 99213 OFFICE O/P EST LOW 20 MIN: CPT | Mod: 25,S$GLB,, | Performed by: DERMATOLOGY

## 2019-10-22 PROCEDURE — 99499 UNLISTED E&M SERVICE: CPT | Mod: S$GLB,,, | Performed by: DERMATOLOGY
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
## 2019-10-23 DIAGNOSIS — K21.9 LPRD (LARYNGOPHARYNGEAL REFLUX DISEASE): ICD-10-CM

## 2019-10-24 ENCOUNTER — PATIENT OUTREACH (OUTPATIENT)
Dept: ADMINISTRATIVE | Facility: HOSPITAL | Age: 74
End: 2019-10-24

## 2019-10-24 RX ORDER — DEXLANSOPRAZOLE 60 MG/1
CAPSULE, DELAYED RELEASE ORAL
Qty: 90 CAPSULE | Refills: 1 | Status: SHIPPED | OUTPATIENT
Start: 2019-10-24 | End: 2020-05-06

## 2019-10-24 NOTE — PROGRESS NOTES
Refill Authorization Note     is requesting a refill authorization.    Brief assessment and rationale for refill: ROUTE: op   Name and strength of medication: DEXILANT 60MG CAP(FORMERLY KAPIDEX)       Medication Therapy Plan: LCO/LOV-gerd stable(8/19); med outside of protocol; route to you     Medication reconciliation completed: No              How patient will take medication: t1c po qam           Comments:   Appointments past 12m or future 3m    Date Provider   Last Visit   8/9/2019 Juan Francisco Jeong MD   Next Visit   11/8/2019 Juan Francisco Jeong MD

## 2019-10-24 NOTE — PROGRESS NOTES
Health Maintenance Due   Topic Date Due    Complete Opioid Risk Tool  04/10/1963    Shingles Vaccine (2 of 3) 06/17/2015    DEXA SCAN  07/19/2019

## 2019-10-24 NOTE — TELEPHONE ENCOUNTER
I have reviewed and agree with the assessment below with changes. GERD LCO(9/19); approve Dexilant for 6 more months.

## 2019-11-08 ENCOUNTER — HOSPITAL ENCOUNTER (OUTPATIENT)
Dept: RADIOLOGY | Facility: HOSPITAL | Age: 74
Discharge: HOME | End: 2019-11-08
Attending: FAMILY MEDICINE
Payer: MEDICARE

## 2019-11-08 ENCOUNTER — OFFICE VISIT (OUTPATIENT)
Dept: FAMILY MEDICINE | Facility: CLINIC | Age: 74
End: 2019-11-08
Payer: MEDICARE

## 2019-11-08 VITALS
SYSTOLIC BLOOD PRESSURE: 128 MMHG | HEART RATE: 54 BPM | DIASTOLIC BLOOD PRESSURE: 62 MMHG | BODY MASS INDEX: 22.63 KG/M2 | WEIGHT: 123 LBS | OXYGEN SATURATION: 98 % | HEIGHT: 62 IN

## 2019-11-08 DIAGNOSIS — J31.0 CHRONIC RHINITIS: ICD-10-CM

## 2019-11-08 DIAGNOSIS — F41.9 ANXIETY: ICD-10-CM

## 2019-11-08 DIAGNOSIS — K21.9 GASTROESOPHAGEAL REFLUX DISEASE, ESOPHAGITIS PRESENCE NOT SPECIFIED: ICD-10-CM

## 2019-11-08 DIAGNOSIS — J44.9 CHRONIC OBSTRUCTIVE PULMONARY DISEASE, UNSPECIFIED COPD TYPE: ICD-10-CM

## 2019-11-08 DIAGNOSIS — Z78.0 MENOPAUSE: ICD-10-CM

## 2019-11-08 DIAGNOSIS — I10 ESSENTIAL HYPERTENSION: Primary | ICD-10-CM

## 2019-11-08 DIAGNOSIS — G47.00 INSOMNIA, UNSPECIFIED TYPE: ICD-10-CM

## 2019-11-08 DIAGNOSIS — E78.5 HYPERLIPIDEMIA, UNSPECIFIED HYPERLIPIDEMIA TYPE: ICD-10-CM

## 2019-11-08 DIAGNOSIS — M54.16 LUMBAR RADICULOPATHY: ICD-10-CM

## 2019-11-08 PROCEDURE — 77080 DXA BONE DENSITY AXIAL: CPT | Mod: TC,PO

## 2019-11-08 PROCEDURE — 77080 DEXA BONE DENSITY SPINE HIP: ICD-10-PCS | Mod: 26,,, | Performed by: RADIOLOGY

## 2019-11-08 PROCEDURE — 99999 PR PBB SHADOW E&M-EST. PATIENT-LVL III: ICD-10-PCS | Mod: PBBFAC,,, | Performed by: FAMILY MEDICINE

## 2019-11-08 PROCEDURE — 99214 PR OFFICE/OUTPT VISIT, EST, LEVL IV, 30-39 MIN: ICD-10-PCS | Mod: S$GLB,,, | Performed by: FAMILY MEDICINE

## 2019-11-08 PROCEDURE — 1101F PT FALLS ASSESS-DOCD LE1/YR: CPT | Mod: CPTII,S$GLB,, | Performed by: FAMILY MEDICINE

## 2019-11-08 PROCEDURE — 99999 PR PBB SHADOW E&M-EST. PATIENT-LVL III: CPT | Mod: PBBFAC,,, | Performed by: FAMILY MEDICINE

## 2019-11-08 PROCEDURE — 99214 OFFICE O/P EST MOD 30 MIN: CPT | Mod: S$GLB,,, | Performed by: FAMILY MEDICINE

## 2019-11-08 PROCEDURE — 1101F PR PT FALLS ASSESS DOC 0-1 FALLS W/OUT INJ PAST YR: ICD-10-PCS | Mod: CPTII,S$GLB,, | Performed by: FAMILY MEDICINE

## 2019-11-08 PROCEDURE — 3074F SYST BP LT 130 MM HG: CPT | Mod: CPTII,S$GLB,, | Performed by: FAMILY MEDICINE

## 2019-11-08 PROCEDURE — 3078F DIAST BP <80 MM HG: CPT | Mod: CPTII,S$GLB,, | Performed by: FAMILY MEDICINE

## 2019-11-08 PROCEDURE — 77080 DXA BONE DENSITY AXIAL: CPT | Mod: 26,,, | Performed by: RADIOLOGY

## 2019-11-08 PROCEDURE — 3074F PR MOST RECENT SYSTOLIC BLOOD PRESSURE < 130 MM HG: ICD-10-PCS | Mod: CPTII,S$GLB,, | Performed by: FAMILY MEDICINE

## 2019-11-08 PROCEDURE — 3078F PR MOST RECENT DIASTOLIC BLOOD PRESSURE < 80 MM HG: ICD-10-PCS | Mod: CPTII,S$GLB,, | Performed by: FAMILY MEDICINE

## 2019-11-08 RX ORDER — HYDROCODONE BITARTRATE AND ACETAMINOPHEN 5; 325 MG/1; MG/1
1 TABLET ORAL 2 TIMES DAILY PRN
Qty: 60 TABLET | Refills: 0 | Status: SHIPPED | OUTPATIENT
Start: 2019-11-16 | End: 2019-12-16

## 2019-11-08 RX ORDER — HYDROCODONE BITARTRATE AND ACETAMINOPHEN 5; 325 MG/1; MG/1
1 TABLET ORAL 2 TIMES DAILY PRN
Qty: 60 TABLET | Refills: 0 | Status: SHIPPED | OUTPATIENT
Start: 2019-12-16 | End: 2020-01-15

## 2019-11-08 RX ORDER — HYDROCODONE BITARTRATE AND ACETAMINOPHEN 5; 325 MG/1; MG/1
1 TABLET ORAL 2 TIMES DAILY PRN
Qty: 60 TABLET | Refills: 0 | Status: SHIPPED | OUTPATIENT
Start: 2020-01-15 | End: 2020-02-14 | Stop reason: SDUPTHER

## 2019-11-08 NOTE — PROGRESS NOTES
Subjective:       Patient ID: Luis Miguel Murcia is a 74 y.o. female.    Chief Complaint: Hypertension    HPI     Here for a f/u.     htn is stable. No c/o cp or sob at rest or exertion. No headaches.      C/o chronic right lumbar radicular pain > 1 year. Ps: 2-3/10 while on norco. Reports mild relief from poppy L5-S1 in 7/5/2018. Has residual constant neuropathy from right calf to right foot which is worse at night.  Takes gabapentin 300 mg qhs for the neuropathy.      Also, has chronic neck pain > 1 year. Ps: 2-3/10 while on norco       Imaging:  C-spine MRI 9/30/10: There is anterolisthesis of C3 on 4 and 4 on 5 mile, retrolisthesis of C5 and 6. At C3/4 there is a mild bulge with foraminal narrowing bilaterally mild. At C5-C6 there is a disc bulge with right upper protrusion of the disc but severe foraminal narrowing on the right and moderate to severe on the left. There is disc bulging at C6/7 with a bulge more to the right.     MRI L-spine 5/24/2018: There is multilevel degenerative change discussed in detail above.  There is no fracture.  There is trace anterolisthesis of L4 on L5 which is degenerative in etiology and unchanged.  All of the discs are desiccated.  There is some degree of disc space narrowing, disc bulge with a without disc protrusions, facet joint arthropathy contributing to stenosis.  These findings will be summarized below. There is degenerative change but there is no spinal canal or significant foraminal stenosis at the T10-11, T11-12, T12-L1 or L1-2 levels. At the L2-3 level, there is mild spinal canal and left foraminal stenosis.  There is facet joint arthropathy.  There is a disc bulge with small left paracentral disc protrusion.  The disc protrusion has occurred since the prior study in the facet joint changes have progressed. At the L3-4 level, there is mild spinal stenosis with moderate left lateral recess stenosis.  Additionally, there is mild-to-moderate left and mild right  foraminal stenosis without definite change. At the L4-5 level, there is multifactorial moderate central spinal stenosis without any significant foraminal stenosis and without definite change. At the L5-S1 level, there is right greater than left facet joint arthropathy.  There is a disc bulge with osteophytic ridging and broad central disc protrusion but there is no significant spinal stenosis.  There is stable mild left and moderate-to-marked right foraminal stenosis.Anxiety stable while taking xanax.     Copd stable. No worsening cough or wheeze.     Insomnia stable while on ambien.       Anxiety stable while on xanax.     gerd stable while on dexilant and zantac.      Has chronic rhinitis. Taking astelin and flonase.         Review of Systems      Review of Systems   Constitutional: Negative for fever and chills.   HENT: Negative for hearing loss and neck stiffness.    Eyes: Negative for redness and itching.   Respiratory: Negative for cough and choking.    Cardiovascular: Negative for chest pain and leg swelling.  Abdomen: Negative for abdominal pain and blood in stool.   Genitourinary: Negative for dysuria and flank pain.   Neurological: Negative for light-headedness and headaches.   Hematological: Negative for adenopathy.   Psychiatric/Behavioral: Negative for behavioral problems.     Objective:      Physical Exam   Constitutional: She appears well-developed.   HENT:   Head: Normocephalic and atraumatic.   Eyes: Pupils are equal, round, and reactive to light. Conjunctivae are normal.   Neck: Normal range of motion.   Cardiovascular: Normal rate and regular rhythm.   Murmur heard.  Pulmonary/Chest: Effort normal and breath sounds normal.   Lymphadenopathy:     She has no cervical adenopathy.       Assessment:       1. Essential hypertension    2. Menopause    3. Lumbar radiculopathy    4. Anxiety    5. Insomnia, unspecified type    6. Chronic obstructive pulmonary disease, unspecified COPD type    7. Chronic  rhinitis    8. Gastroesophageal reflux disease, esophagitis presence not specified    9. Hyperlipidemia, unspecified hyperlipidemia type        Plan:       Essential hypertension    Menopause  -     DXA Bone Density Spine And Hip; Future; Expected date: 11/08/2019    Lumbar radiculopathy    Anxiety    Insomnia, unspecified type    Chronic obstructive pulmonary disease, unspecified COPD type    Chronic rhinitis    Gastroesophageal reflux disease, esophagitis presence not specified    Hyperlipidemia, unspecified hyperlipidemia type    Other orders  -     HYDROcodone-acetaminophen (NORCO) 5-325 mg per tablet; Take 1 tablet by mouth 2 (two) times daily as needed for Pain.  Dispense: 60 tablet; Refill: 0  -     HYDROcodone-acetaminophen (NORCO) 5-325 mg per tablet; Take 1 tablet by mouth 2 (two) times daily as needed for Pain.  Dispense: 60 tablet; Refill: 0  -     HYDROcodone-acetaminophen (NORCO) 5-325 mg per tablet; Take 1 tablet by mouth 2 (two) times daily as needed for Pain.  Dispense: 60 tablet; Refill: 0      Plan:  rf norco   Order bmd  Cont all other meds        Medication List with Changes/Refills   New Medications    HYDROCODONE-ACETAMINOPHEN (NORCO) 5-325 MG PER TABLET    Take 1 tablet by mouth 2 (two) times daily as needed for Pain.    HYDROCODONE-ACETAMINOPHEN (NORCO) 5-325 MG PER TABLET    Take 1 tablet by mouth 2 (two) times daily as needed for Pain.   Current Medications    ALBUTEROL (VENTOLIN HFA) 90 MCG/ACTUATION INHALER    INHALE 2 PUFFS INTO THE LUNGS EVERY 6 HOURS AS NEEDED FOR WHEEZING    ALPRAZOLAM (XANAX) 0.25 MG TABLET    TAKE 1 TABLET BY MOUTH TWICE DAILY AS NEEDED FOR ANXIETY    AMLODIPINE (NORVASC) 5 MG TABLET    TAKE 1 TABLET(5 MG) BY MOUTH EVERY DAY    ASCORBATE CALCIUM (VITAMIN C ORAL)    Take 1,000 mg by mouth once daily.     ASPIRIN (ECOTRIN) 81 MG EC TABLET    Take 81 mg by mouth once daily.    AZELASTINE (ASTELIN) 137 MCG (0.1 %) NASAL SPRAY    USE 2 SPRAYS IN EACH NOSTRIL EVERY DAY.  MAY USE UP TO 2 TIMES DAILY DEPENDING ON SYMPTOMS    B COMPLEX VITAMINS TABLET    Take 1 tablet by mouth once daily.      CALCIUM CITRATE-VITAMIN D3 ORAL        CARVEDILOL (COREG) 12.5 MG TABLET    TAKE 1 TABLET(12.5 MG) BY MOUTH TWICE DAILY WITH MEALS    CO-ENZYME Q-10 30 MG CAPSULE        DEXILANT 60 MG CAPSULE    TAKE 1 CAPSULE(60 MG) BY MOUTH EVERY MORNING BEFORE BREAKFAST    FAMOTIDINE (PEPCID) 20 MG TABLET    TAKE 1 TABLET(20 MG) BY MOUTH TWICE DAILY    FLUTICASONE PROPIONATE (FLONASE) 50 MCG/ACTUATION NASAL SPRAY    SHAKE LIQUID AND USE 2 SPRAYS(100 MCG) IN EACH NOSTRIL EVERY DAY    GABAPENTIN (NEURONTIN) 600 MG TABLET    Take 1 tablet (600 mg total) by mouth every evening.    ONDANSETRON (ZOFRAN-ODT) 4 MG TBDL    DIS 1 T UNT Q 4 H PRN N    PRAVASTATIN (PRAVACHOL) 20 MG TABLET    TAKE 1 TABLET(20 MG) BY MOUTH EVERY NIGHT    PSYLLIUM HUSK 0.4 GRAM CAP    Take by mouth daily as needed.    TRIAMCINOLONE ACETONIDE 0.1% (KENALOG) 0.1 % CREAM    Apply topically 2 (two) times daily.    VITAMIN B-12 1000 MCG TABLET    Take 1,000 mcg by mouth once daily.     ZOLPIDEM (AMBIEN) 10 MG TAB    TAKE 1 TABLET(10 MG) BY MOUTH EVERY NIGHT   Changed and/or Refilled Medications    Modified Medication Previous Medication    HYDROCODONE-ACETAMINOPHEN (NORCO) 5-325 MG PER TABLET HYDROcodone-acetaminophen (NORCO) 5-325 mg per tablet       Take 1 tablet by mouth 2 (two) times daily as needed for Pain.    Take 1 tablet by mouth 2 (two) times daily as needed for Pain.

## 2019-11-27 ENCOUNTER — TELEPHONE (OUTPATIENT)
Dept: FAMILY MEDICINE | Facility: CLINIC | Age: 74
End: 2019-11-27

## 2019-11-27 RX ORDER — IBANDRONATE SODIUM 150 MG/1
150 TABLET, FILM COATED ORAL
Qty: 1 TABLET | Refills: 11 | Status: SHIPPED | OUTPATIENT
Start: 2019-11-27 | End: 2020-10-28

## 2019-11-27 NOTE — TELEPHONE ENCOUNTER
----- Message from Cristina Crain sent at 11/27/2019  3:44 PM CST -----  Type:  Patient Returning Call    Who Called:  Patient  Who Left Message for Patient:  Liz  Does the patient know what this is regarding?:  no  Best Call Back Number:  [phone] (home)

## 2019-11-27 NOTE — PROGRESS NOTES
inform pt via phone that I reviewed the test results and note the following:    Your bone mineral density result shows osteopenia, which is less dense than normal bone density, but not classified as osteoporosis.      Recommend 800 IU of vitamin D and 1200 mg of calcium divided in 2-3 doses per day.     Also, E sent in boniva.

## 2019-11-29 ENCOUNTER — TELEPHONE (OUTPATIENT)
Dept: FAMILY MEDICINE | Facility: CLINIC | Age: 74
End: 2019-11-29

## 2019-11-29 NOTE — TELEPHONE ENCOUNTER
----- Message from Malgorzata Valencia sent at 11/27/2019  5:10 PM CST -----  Pt is returning a missed call.please call and advise        Thank you

## 2019-12-11 ENCOUNTER — TELEPHONE (OUTPATIENT)
Dept: DERMATOLOGY | Facility: CLINIC | Age: 74
End: 2019-12-11

## 2019-12-11 NOTE — TELEPHONE ENCOUNTER
----- Message from Alejo Garcia sent at 12/11/2019  8:12 AM CST -----  Contact: Ptnt [phone]  Type: Needs Medical Advice    Who Called:  Ptnt [phone]    Symptoms (please be specific):  Eczema on her neck.    How long has patient had these symptoms: The past few months.    Additional Information: Advised can't make it today the next available appmnt 01-07-20, would like to be seen tomorrow when the weather is better. Please call to schedule for tomorrow.

## 2019-12-11 NOTE — TELEPHONE ENCOUNTER
Tried to reach pt. No answer, will try again later and I left a message on answering machine.    Contacting to inform pt of openings in provider schedule.

## 2020-01-16 ENCOUNTER — OFFICE VISIT (OUTPATIENT)
Dept: DERMATOLOGY | Facility: CLINIC | Age: 75
End: 2020-01-16
Payer: MEDICARE

## 2020-01-16 VITALS — HEIGHT: 62 IN | BODY MASS INDEX: 22.63 KG/M2 | RESPIRATION RATE: 18 BRPM | WEIGHT: 123 LBS

## 2020-01-16 DIAGNOSIS — L82.1 SEBORRHEIC KERATOSES: Primary | ICD-10-CM

## 2020-01-16 DIAGNOSIS — L29.9 PRURITIC CONDITION: ICD-10-CM

## 2020-01-16 PROCEDURE — 1126F PR PAIN SEVERITY QUANTIFIED, NO PAIN PRESENT: ICD-10-PCS | Mod: HCNC,S$GLB,, | Performed by: DERMATOLOGY

## 2020-01-16 PROCEDURE — 1159F MED LIST DOCD IN RCRD: CPT | Mod: HCNC,S$GLB,, | Performed by: DERMATOLOGY

## 2020-01-16 PROCEDURE — 99213 OFFICE O/P EST LOW 20 MIN: CPT | Mod: HCNC,S$GLB,, | Performed by: DERMATOLOGY

## 2020-01-16 PROCEDURE — 99999 PR PBB SHADOW E&M-EST. PATIENT-LVL III: CPT | Mod: PBBFAC,HCNC,, | Performed by: DERMATOLOGY

## 2020-01-16 PROCEDURE — 1101F PR PT FALLS ASSESS DOC 0-1 FALLS W/OUT INJ PAST YR: ICD-10-PCS | Mod: HCNC,CPTII,S$GLB, | Performed by: DERMATOLOGY

## 2020-01-16 PROCEDURE — 1101F PT FALLS ASSESS-DOCD LE1/YR: CPT | Mod: HCNC,CPTII,S$GLB, | Performed by: DERMATOLOGY

## 2020-01-16 PROCEDURE — 1126F AMNT PAIN NOTED NONE PRSNT: CPT | Mod: HCNC,S$GLB,, | Performed by: DERMATOLOGY

## 2020-01-16 PROCEDURE — 1159F PR MEDICATION LIST DOCUMENTED IN MEDICAL RECORD: ICD-10-PCS | Mod: HCNC,S$GLB,, | Performed by: DERMATOLOGY

## 2020-01-16 PROCEDURE — 99999 PR PBB SHADOW E&M-EST. PATIENT-LVL III: ICD-10-PCS | Mod: PBBFAC,HCNC,, | Performed by: DERMATOLOGY

## 2020-01-16 PROCEDURE — 99213 PR OFFICE/OUTPT VISIT, EST, LEVL III, 20-29 MIN: ICD-10-PCS | Mod: HCNC,S$GLB,, | Performed by: DERMATOLOGY
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
## 2020-01-17 DIAGNOSIS — F41.9 ANXIETY: ICD-10-CM

## 2020-01-20 NOTE — PROGRESS NOTES
Refill Routing Note     Medication(s) are appropriate for refill:    Medication Outside of Protocol    Appointments  past 15m or future 3m with PCP    Date Provider   Last Visit   11/8/2019 Juan Francisco Jeong MD   Next Visit   2/14/2020 Juan Francisco Jeong MD       Automatic Epic Protocol Generated Data:    Requested Prescriptions   Pending Prescriptions Disp Refills    ALPRAZolam (XANAX) 0.25 MG tablet [Pharmacy Med Name: ALPRAZOLAM 0.25MG TABLETS] 60 tablet      Sig: TAKE 1 TABLET BY MOUTH TWICE DAILY AS NEEDED FOR ANXIETY       There is no refill protocol information for this order       gabapentin (NEURONTIN) 600 MG tablet [Pharmacy Med Name: GABAPENTIN 600MG TABLETS] 30 tablet 11     Sig: TAKE 1 TABLET(600 MG) BY MOUTH EVERY EVENING       Anticonvulsants Protocol Passed - 1/17/2020  6:49 PM        Passed - Visit with Authorizing provider in past 9 months or upcoming 90 days        Passed - No active pregnancy on record

## 2020-01-22 RX ORDER — GABAPENTIN 600 MG/1
TABLET ORAL
Qty: 30 TABLET | Refills: 2 | Status: SHIPPED | OUTPATIENT
Start: 2020-01-22 | End: 2020-04-20 | Stop reason: SDUPTHER

## 2020-01-22 RX ORDER — ALPRAZOLAM 0.25 MG/1
TABLET ORAL
Qty: 60 TABLET | Refills: 2 | Status: SHIPPED | OUTPATIENT
Start: 2020-01-22 | End: 2020-04-22

## 2020-02-14 ENCOUNTER — OFFICE VISIT (OUTPATIENT)
Dept: FAMILY MEDICINE | Facility: CLINIC | Age: 75
End: 2020-02-14
Payer: MEDICARE

## 2020-02-14 VITALS
HEIGHT: 62 IN | BODY MASS INDEX: 22.11 KG/M2 | HEART RATE: 60 BPM | DIASTOLIC BLOOD PRESSURE: 68 MMHG | WEIGHT: 120.13 LBS | SYSTOLIC BLOOD PRESSURE: 112 MMHG

## 2020-02-14 DIAGNOSIS — J32.9 SINUSITIS, UNSPECIFIED CHRONICITY, UNSPECIFIED LOCATION: ICD-10-CM

## 2020-02-14 DIAGNOSIS — F41.9 ANXIETY: ICD-10-CM

## 2020-02-14 DIAGNOSIS — M54.16 LUMBAR RADICULOPATHY: ICD-10-CM

## 2020-02-14 DIAGNOSIS — G47.00 INSOMNIA, UNSPECIFIED TYPE: ICD-10-CM

## 2020-02-14 DIAGNOSIS — K21.9 GASTROESOPHAGEAL REFLUX DISEASE, ESOPHAGITIS PRESENCE NOT SPECIFIED: ICD-10-CM

## 2020-02-14 DIAGNOSIS — I10 ESSENTIAL HYPERTENSION: Primary | ICD-10-CM

## 2020-02-14 PROCEDURE — 3074F PR MOST RECENT SYSTOLIC BLOOD PRESSURE < 130 MM HG: ICD-10-PCS | Mod: HCNC,CPTII,S$GLB, | Performed by: FAMILY MEDICINE

## 2020-02-14 PROCEDURE — 99999 PR PBB SHADOW E&M-EST. PATIENT-LVL III: ICD-10-PCS | Mod: PBBFAC,HCNC,, | Performed by: FAMILY MEDICINE

## 2020-02-14 PROCEDURE — 1159F PR MEDICATION LIST DOCUMENTED IN MEDICAL RECORD: ICD-10-PCS | Mod: HCNC,S$GLB,, | Performed by: FAMILY MEDICINE

## 2020-02-14 PROCEDURE — 1126F AMNT PAIN NOTED NONE PRSNT: CPT | Mod: HCNC,S$GLB,, | Performed by: FAMILY MEDICINE

## 2020-02-14 PROCEDURE — 3074F SYST BP LT 130 MM HG: CPT | Mod: HCNC,CPTII,S$GLB, | Performed by: FAMILY MEDICINE

## 2020-02-14 PROCEDURE — 1101F PR PT FALLS ASSESS DOC 0-1 FALLS W/OUT INJ PAST YR: ICD-10-PCS | Mod: HCNC,CPTII,S$GLB, | Performed by: FAMILY MEDICINE

## 2020-02-14 PROCEDURE — 99214 PR OFFICE/OUTPT VISIT, EST, LEVL IV, 30-39 MIN: ICD-10-PCS | Mod: HCNC,S$GLB,, | Performed by: FAMILY MEDICINE

## 2020-02-14 PROCEDURE — 3078F DIAST BP <80 MM HG: CPT | Mod: HCNC,CPTII,S$GLB, | Performed by: FAMILY MEDICINE

## 2020-02-14 PROCEDURE — 3078F PR MOST RECENT DIASTOLIC BLOOD PRESSURE < 80 MM HG: ICD-10-PCS | Mod: HCNC,CPTII,S$GLB, | Performed by: FAMILY MEDICINE

## 2020-02-14 PROCEDURE — 99999 PR PBB SHADOW E&M-EST. PATIENT-LVL III: CPT | Mod: PBBFAC,HCNC,, | Performed by: FAMILY MEDICINE

## 2020-02-14 PROCEDURE — 1126F PR PAIN SEVERITY QUANTIFIED, NO PAIN PRESENT: ICD-10-PCS | Mod: HCNC,S$GLB,, | Performed by: FAMILY MEDICINE

## 2020-02-14 PROCEDURE — 99214 OFFICE O/P EST MOD 30 MIN: CPT | Mod: HCNC,S$GLB,, | Performed by: FAMILY MEDICINE

## 2020-02-14 PROCEDURE — 1159F MED LIST DOCD IN RCRD: CPT | Mod: HCNC,S$GLB,, | Performed by: FAMILY MEDICINE

## 2020-02-14 PROCEDURE — 1101F PT FALLS ASSESS-DOCD LE1/YR: CPT | Mod: HCNC,CPTII,S$GLB, | Performed by: FAMILY MEDICINE

## 2020-02-14 RX ORDER — DOXYCYCLINE 100 MG/1
100 CAPSULE ORAL 2 TIMES DAILY
Qty: 20 CAPSULE | Refills: 0 | Status: SHIPPED | OUTPATIENT
Start: 2020-02-14 | End: 2020-09-21

## 2020-02-14 RX ORDER — HYDROCODONE BITARTRATE AND ACETAMINOPHEN 5; 325 MG/1; MG/1
1 TABLET ORAL 2 TIMES DAILY PRN
Qty: 60 TABLET | Refills: 0 | Status: SHIPPED | OUTPATIENT
Start: 2020-02-17 | End: 2020-03-18

## 2020-02-14 RX ORDER — HYDROCODONE BITARTRATE AND ACETAMINOPHEN 5; 325 MG/1; MG/1
1 TABLET ORAL 2 TIMES DAILY PRN
Qty: 60 TABLET | Refills: 0 | Status: SHIPPED | OUTPATIENT
Start: 2020-04-17 | End: 2020-05-18 | Stop reason: SDUPTHER

## 2020-02-14 RX ORDER — HYDROCODONE BITARTRATE AND ACETAMINOPHEN 5; 325 MG/1; MG/1
1 TABLET ORAL 2 TIMES DAILY PRN
Qty: 60 TABLET | Refills: 0 | Status: SHIPPED | OUTPATIENT
Start: 2020-03-18 | End: 2020-04-17

## 2020-02-14 RX ORDER — ZOLPIDEM TARTRATE 10 MG/1
TABLET ORAL
Qty: 30 TABLET | Refills: 5 | Status: SHIPPED | OUTPATIENT
Start: 2020-02-14 | End: 2020-08-19

## 2020-02-14 RX ORDER — PREDNISONE 10 MG/1
TABLET ORAL
Qty: 20 TABLET | Refills: 0 | Status: SHIPPED | OUTPATIENT
Start: 2020-02-14 | End: 2020-09-21

## 2020-04-15 DIAGNOSIS — I10 ESSENTIAL HYPERTENSION: ICD-10-CM

## 2020-04-20 DIAGNOSIS — R12 HEARTBURN: ICD-10-CM

## 2020-04-20 DIAGNOSIS — I10 ESSENTIAL HYPERTENSION: ICD-10-CM

## 2020-04-20 RX ORDER — AMLODIPINE BESYLATE 5 MG/1
TABLET ORAL
Qty: 90 TABLET | Refills: 3 | Status: SHIPPED | OUTPATIENT
Start: 2020-04-20 | End: 2021-04-26 | Stop reason: SDUPTHER

## 2020-04-20 NOTE — TELEPHONE ENCOUNTER
----- Message from Princess CODI Braga sent at 4/20/2020  9:47 AM CDT -----  Contact: pt  Type:  RX Refill Request    Who Called:  Patient  Refill or New Rx:  Refill  RX Name and Strength:  amLODIPine (NORVASC) 5 MG tablet, gabapentin (NEURONTIN) 600 MG tablet and famotidine (PEPCID) 20 MG tablet  Preferred Pharmacy with phone number:    [id number] - [place] [address] & [address] [address]  Phone: [phone] Fax: [phone]  Local or Mail Order:  Local  Ordering Provider:  Dr. Jean-Paul Bowie Call Back Number:  [phone]  Additional Information:  Please call when rx is sent over

## 2020-04-21 ENCOUNTER — TELEPHONE (OUTPATIENT)
Dept: GASTROENTEROLOGY | Facility: CLINIC | Age: 75
End: 2020-04-21

## 2020-04-21 DIAGNOSIS — R12 HEARTBURN: ICD-10-CM

## 2020-04-21 DIAGNOSIS — F41.9 ANXIETY: ICD-10-CM

## 2020-04-21 NOTE — PROGRESS NOTES
Refill Routing Note   Medication(s) are not appropriate for processing by [facility]:       Outside of protocol               Medication reconciliation completed: No        Appointments [id number] or future 3m with PCP    Date Provider   Last Visit   2/14/2020 Juan Francisco Jeong MD   Next Visit   5/12/2020 Juan Francisco Jeong MD     Automatic Epic Protocol Generated Data:    Requested Prescriptions   Pending Prescriptions Disp Refills    ALPRAZolam (XANAX) 0.25 MG tablet [Pharmacy Med Name: ALPRAZOLAM 0.25MG TABLETS] 60 tablet      Sig: TAKE 1 TABLET BY MOUTH TWICE DAILY AS NEEDED FOR ANXIETY       There is no refill protocol information for this order           Note composed:5:55 PM 04/21/2020

## 2020-04-21 NOTE — PROGRESS NOTES
Refill Authorization Note     is requesting a refill authorization.    Brief assessment and rationale for refill: APPROVE: prr                                         Comments:   Refill Center Care Gap Closure protocols temporarily suspended.   Requested Prescriptions   Signed Prescriptions Disp Refills    amLODIPine (NORVASC) 5 MG tablet 90 tablet 3     Sig: TAKE 1 TABLET(5 MG) BY MOUTH EVERY DAY       Cardiovascular:  Calcium Channel Blockers Passed - 4/15/2020  1:14 PM        Passed - Patient is at least 18 years old        Passed - Last BP in normal range within 360 days.     BP Readings from Last 3 Encounters:   02/14/20 112/68   11/08/19 128/62   08/09/19 118/74              Passed - Office visit in past 12 months or future 90 days.     Recent Outpatient Visits            2 months ago Essential hypertension    [place] [facility] Juan Francisco Jeong MD    3 months ago Seborrheic keratoses    [place] [facility] Sudha Marie MD    5 months ago Essential hypertension    [place] [facility] Juan Francisco Jeong MD    6 months ago Purpura    [place] [facility] Sudha Marie MD    7 months ago Heartburn    [place] [facility] SANTOSH Low          Future Appointments              In 3 weeks Juan Francisco Jeong MD [facility] [place]                 Appointments  past 12m or future 3m with PCP    Date Provider   Last Visit   2/14/2020 Juan Francisco Jeong MD   Next Visit   4/20/2020 Juan Francisco Jeong MD   .  ED visits in past 90 days: 0       Note composed:7:38 PM 04/20/2020

## 2020-04-22 RX ORDER — FAMOTIDINE 20 MG/1
TABLET, FILM COATED ORAL
Qty: 180 TABLET | Refills: 0 | Status: SHIPPED | OUTPATIENT
Start: 2020-04-22

## 2020-04-22 RX ORDER — AMLODIPINE BESYLATE 5 MG/1
TABLET ORAL
Qty: 90 TABLET | Refills: 2 | OUTPATIENT
Start: 2020-04-22

## 2020-04-22 RX ORDER — FAMOTIDINE 20 MG/1
TABLET, FILM COATED ORAL
Qty: 180 TABLET | Refills: 1 | OUTPATIENT
Start: 2020-04-22

## 2020-04-22 RX ORDER — ALPRAZOLAM 0.25 MG/1
TABLET ORAL
Qty: 60 TABLET | Refills: 2 | Status: SHIPPED | OUTPATIENT
Start: 2020-04-22 | End: 2020-06-18 | Stop reason: SDUPTHER

## 2020-04-22 RX ORDER — GABAPENTIN 600 MG/1
TABLET ORAL
Qty: 30 TABLET | Refills: 2 | Status: SHIPPED | OUTPATIENT
Start: 2020-04-22 | End: 2020-07-26

## 2020-04-22 NOTE — TELEPHONE ENCOUNTER
Refill Routing Note    Medication(s) are not appropriate for processing by [facility]:       Outside of protocol              Medication reconciliation completed: No      Appointments [id number] or future 3m with PCP    Date Provider   Last Visit   2/14/2020 Juan Francisco Jeong MD   Next Visit   4/21/2020 Juan Francisco Jeong MD     Automatic Epic Protocol Generated Data:    Requested Prescriptions   Pending Prescriptions Disp Refills    gabapentin (NEURONTIN) 600 MG tablet 30 tablet 2     Sig: TAKE 1 TABLET(600 MG) BY MOUTH EVERY EVENING       Anticonvulsants Protocol Passed - 4/20/2020  9:56 AM        Passed - Visit with Authorizing provider in past 9 months or upcoming 90 days        Passed - No active pregnancy on record      Refused Prescriptions Disp Refills    amLODIPine (NORVASC) 5 MG tablet 90 tablet 2     Sig: TAKE 1 TABLET(5 MG) BY MOUTH EVERY DAY       Cardiovascular:  Calcium Channel Blockers Passed - 4/20/2020  9:56 AM        Passed - Patient is at least 18 years old        Passed - Last BP in normal range within 360 days.     BP Readings from Last 3 Encounters:   02/14/20 112/68   11/08/19 128/62   08/09/19 118/74              Passed - Office visit in past 12 months or future 90 days.     Recent Outpatient Visits            2 months ago Essential hypertension    [facility] Juan Francisco Jeong MD    3 months ago Seborrheic keratoses    [facility] Sudha Marie MD    5 months ago Essential hypertension    [facility] Juan Francisco Jeong MD    6 months ago Purpura    [facility] Sudha Marie MD    7 months ago Heartburn    [place] [facility] SANTOSH Lwo          Future Appointments              In 2 weeks Juan Francisco Jeong MD [facility], [place]                Powered by RetSK[employer] - 4/20/2020  9:56 AM        This is a duplicate request of medication requested 2020-04-15. Check to see if the  patient is requesting a refill from a new pharmacy.       famotidine (PEPCID) 20 MG tablet 180 tablet 1     Sig: TAKE 1 TABLET(20 MG) BY MOUTH TWICE DAILY       Gastroenterology: [facility]agon[employer] - famotidine Passed - 4/20/2020  9:56 AM        Passed - Patient is at least 18 years old        Passed - Office visit in past 12 months or future 90 days.     Recent Outpatient Visits            2 months ago Essential hypertension    [facility] Juan Francisco Jeong MD    3 months ago Seborrheic keratoses    [facility] Sudha Marie MD    5 months ago Essential hypertension    [facility] Juan Francisco Jeong MD    6 months ago Purpura    [facility] Sudha Marie MD    7 months ago Heartburn    [facility] Ivette Schaefer, [facility]          Future Appointments              In 2 weeks Juan Francisco Jeong MD [facility]                Passed - Cr is 1.3 or below and within 360 days     Creatinine   Date Value Ref Range Status   09/24/2019 1.0 0.5 - 1.4 mg/dL Final   09/24/2019 1.0 0.5 - 1.4 mg/dL Final   10/08/2018 0.8 0.5 - 1.4 mg/dL Final              Passed - eGFR is 60 or above and within 360 days     eGFR if non    Date Value Ref Range Status   09/24/2019 56 (A) >60 mL/min/1.73 m^2 Final     Comment:     Calculation used to obtain the estimated glomerular filtration  rate (eGFR) is the CKD-EPI equation.      09/24/2019 55.6 (A) >60 mL/min/1.73 m^2 Final     Comment:     Calculation used to obtain the estimated glomerular filtration  rate (eGFR) is the CKD-EPI equation.      10/08/2018 >60.0 >60 mL/min/1.73 m^2 Final     Comment:     Calculation used to obtain the estimated glomerular filtration  rate (eGFR) is the CKD-EPI equation.        eGFR if    Date Value Ref Range Status   09/24/2019 >60 >60 mL/min/1.73 m^2 Final   09/24/2019 >60.0 >60 mL/min/1.73 m^2 Final   10/08/2018 >60.0 >60  mL/min/1.73 m^2 Final                 Note composed:11:35 AM 04/22/2020

## 2020-04-22 NOTE — TELEPHONE ENCOUNTER
Please inform the patient that I refilled the prescription for pepcid and patient is due for a follow-up visit for continued evaluation and management.  Due to COVID 19 concerns, please offer patient virtual visit (telemedicine visit) option. If patient declines virtual visit option, recommend rescheduling follow-up appointment in 1 month.  Thanks,

## 2020-04-22 NOTE — PROGRESS NOTES
Quick DC. Duplicate Request   Refill Authorization Note     is requesting a refill authorization.    Brief assessment and rationale for refill: quick dc; duplicate request (famotidine/amlodipine) // ROUTE: op                Medication reconciliation completed: No                         Comments:   Pended Medication(s)   Requested Prescriptions     Pending Prescriptions Disp Refills    gabapentin (NEURONTIN) 600 MG tablet 30 tablet 2     Sig: TAKE 1 TABLET(600 MG) BY MOUTH EVERY EVENING     Refused Prescriptions Disp Refills    amLODIPine (NORVASC) 5 MG tablet 90 tablet 2     Sig: TAKE 1 TABLET(5 MG) BY MOUTH EVERY DAY     Refused By: GRACE ROWLEY     Reason for Refusal: Request already responded to by other means (e.g. phone or fax)    famotidine (PEPCID) 20 MG tablet 180 tablet 1     Sig: TAKE 1 TABLET(20 MG) BY MOUTH TWICE DAILY     Refused By: GRACE ROWLEY     Reason for Refusal: Request already responded to by other means (e.g. phone or fax)        Duplicate Pended Encounter(s)/ Last Prescribed Details:    Ordering User:  SANTOSH Low            Diagnosis Association: Heartburn (R12)      Original Order:  famotidine (PEPCID) 20 MG tablet [[id number]]      Pharmacy:  [id number] [place] [address] [address] [facility] [address] [employer] LUDWIG #:  [id number]     Pharmacy Comments:  --          Fill quantity remaining:  -- Fill quantity used:  -- Next fill due: --       Outpatient Medication Detail      Disp Refills Start End    famotidine (PEPCID) 20 MG tablet 180 tablet 0 4/22/2020     Sig: TAKE 1 TABLET BY MOUTH TWICE DAILY    Sent to pharmacy as: famotidine (PEPCID) 20 MG tablet    E-Prescribing Status: Receipt confirmed by pharmacy (4/22/2020  9:29 AM CDT)    Ordering Encounter Report     Associated Reports   View Encounter     Authorizing Provider: Juan Francisco Jeong MD LUDWIG #:  [id number] NPI:  [id number]    Ordering User:  Vlad Ryan, JelenaD             Diagnosis Association: Essential hypertension (I10)      Original Order:  amLODIPine (NORVASC) 5 MG tablet [[id number]]      Pharmacy:  [facility] #[id number] [place] [address] [address] [facility] [address] LUDWIG #:  [id number]     Pharmacy Comments:  --          Fill quantity remaining:  -- Fill quantity used:  -- Next fill due: --       Outpatient Medication Detail      Disp Refills Start End    amLODIPine (NORVASC) 5 MG tablet 90 tablet 3 4/20/2020     Sig: TAKE 1 TABLET(5 MG) BY MOUTH EVERY DAY    Sent to pharmacy as: amLODIPine (NORVASC) 5 MG tablet    E-Prescribing Status: Receipt confirmed by pharmacy (4/20/2020  7:38 PM CDT)    Ordering Encounter Report     Associated Reports   View Encounter                      Note composed:11:34 AM 04/22/2020

## 2020-05-04 ENCOUNTER — TELEPHONE (OUTPATIENT)
Dept: FAMILY MEDICINE | Facility: CLINIC | Age: 75
End: 2020-05-04

## 2020-05-04 DIAGNOSIS — K21.9 LPRD (LARYNGOPHARYNGEAL REFLUX DISEASE): ICD-10-CM

## 2020-05-04 NOTE — TELEPHONE ENCOUNTER
----- Message from Albert Blanca MA sent at 5/4/2020  2:24 PM CDT -----  Contact: Patient   Type: Needs Medical Advice    Who Called:  Patient  Patient to know if she could push her appointment back if possible due to COVID concerns, but still get her medications. If not she is ok with coming to her appointment for 5/12/2020.  She wanted the office to know she does not have any outside medical records at this time.   Pharmacy name and phone #: [phone]

## 2020-05-06 RX ORDER — CARVEDILOL 12.5 MG/1
TABLET ORAL
Qty: 180 TABLET | Refills: 3 | Status: SHIPPED | OUTPATIENT
Start: 2020-05-06 | End: 2021-04-26 | Stop reason: SDUPTHER

## 2020-05-06 RX ORDER — DEXLANSOPRAZOLE 60 MG/1
CAPSULE, DELAYED RELEASE ORAL
Qty: 90 CAPSULE | Refills: 3 | Status: SHIPPED | OUTPATIENT
Start: 2020-05-06 | End: 2021-06-07 | Stop reason: SDUPTHER

## 2020-05-06 NOTE — TELEPHONE ENCOUNTER
Refill Authorization Note    is requesting a refill authorization.    Brief assessment and rationale for refill: APPROVE: prr               Medication reconciliation completed: No                         Comments:      Requested Prescriptions   Signed Prescriptions Disp Refills    carvediloL (COREG) 12.5 MG tablet 180 tablet 3     Sig: TAKE 1 TABLET(12.5 MG) BY MOUTH TWICE DAILY WITH MEALS       Cardiovascular:  Beta Blockers Passed - 5/4/2020  1:58 PM        Passed - Patient is at least 18 years old        Passed - Last BP in normal range within 360 days.     BP Readings from Last 3 Encounters:   02/14/20 112/68   11/08/19 128/62   08/09/19 118/74              Passed - Last Heart Rate in normal range within 360 days.     Pulse Readings from Last 3 Encounters:   02/14/20 60   11/08/19 54   08/09/19 58             Passed - Office visit in past 12 months or future 90 days.     Recent Outpatient Visits            2 months ago Essential hypertension    [facility] Juan Francisco Jeong MD    3 months ago Seborrheic keratoses    [facility] [facility] Sudha Marie MD    6 months ago Essential hypertension    [facility] Juan Francisco Jeong MD    6 months ago Purpura    [facility] Sudha Marie MD    7 months ago Heartburn    [place] [facility] SANTOSH Low          Future Appointments              In 1 month Juan Francisco Jeong MD [facility] [place]                 Appointments  past 12m or future 3m with PCP    Date Provider   Last Visit   2/14/2020 Juan Francisco Jeong MD   Next Visit   5/4/2020 Juan Francisco Jeong MD   ED visits in past 90 days: [unfilled]     Note composed:8:54 AM 05/06/2020

## 2020-05-06 NOTE — TELEPHONE ENCOUNTER
Refill Authorization Note    is requesting a refill authorization.    Brief assessment and rationale for refill: APPROVE: prr               Medication reconciliation completed: No                         Comments:      Requested Prescriptions   Signed Prescriptions Disp Refills    DEXILANT 60 mg capsule 90 capsule 3     Sig: TAKE 1 CAPSULE(60 MG) BY MOUTH EVERY MORNING BEFORE BREAKFAST       Gastroenterology: Proton Pump Inhibitors 2 Passed - 5/4/2020  2:14 PM        Passed - Patient is at least 18 years old        Passed - Osteoporosis is not on problem list        Passed - An appropriate indication is on the problem list        Passed - Office visit in past 6 months or future 90 days.     Recent Outpatient Visits            2 months ago Essential hypertension    [facility] Juan Francisco Jeong MD    3 months ago Seborrheic keratoses    [facility] Sudha Marie MD    6 months ago Essential hypertension    [facility] Juan Francisco Jeong MD    6 months ago Purpura    [facility] Sudha Marie MD    7 months ago Heartburn    [facility] Ivette Schaefer, SANTOSH          Future Appointments              In 1 month Juan Francisco Jeong MD [facility]                 Appointments  past 12m or future 3m with PCP    Date Provider   Last Visit   2/14/2020 Juan Francisco Jeong MD   Next Visit   5/4/2020 Juan Francisco Jeong MD   ED visits in past 90 days: [unfilled]     Note composed:9:27 AM 05/06/2020

## 2020-05-12 NOTE — LETTER
May 26, 2020    Luis Miguel Murcia  [address] [address]  [place] [address]             [facility]  [address]  [place] [address]  Phone: [phone]  Fax: [phone] Dear Ms. Murcia:    This letter is a reminder that your Cholesterol test is due. Please call our office to schedule an appointment.    If you've already underwent or scheduled this procedure, please disregard this notice.    Sincerely,        Juan Francisco Jeong MD

## 2020-05-14 NOTE — PROGRESS NOTES
Refill Routing Note    Medication(s) are not appropriate for processing by [facility]:       Required laboratory values are outdated >6 months     Medication-related problems identified: Requires labs  Medication Therapy Plan: NTBS (lipids); defer  Will follow up with your staff to schedule labs after your decision.   Medication reconciliation completed: No      Automatic Epic Protocol Generated Data:    Requested Prescriptions   Pending Prescriptions Disp Refills    pravastatin (PRAVACHOL) 20 MG tablet [Pharmacy Med Name: PRAVASTATIN 20MG TABLETS] 90 tablet 0     Sig: TAKE 1 TABLET(20 MG) BY MOUTH EVERY NIGHT       Cardiovascular:  Antilipid - Statins Failed - 5/12/2020 12:41 PM        Failed - Lipid Panel completed in last 360 days     Lab Results   Component Value Date    CHOL 193 10/08/2018    HDL 66 10/08/2018    LDLCALC 111.2 10/08/2018    TRIG 79 10/08/2018             Passed - Patient is at least 18 years old        Passed - Office visit in past 12 months or future 90 days.     Recent Outpatient Visits            3 months ago Essential hypertension    [facility] Juan Francisco Jeong MD    3 months ago Seborrheic keratoses    [facility] Sudha Marie MD    6 months ago Essential hypertension    [facility] Juan Francisco Jeong MD    6 months ago Purpura    [facility] Sudha Marie MD    8 months ago Heartburn    [facility] Ivette Schaefer, SANTOSH          Future Appointments              In 1 month Juan Francisco Jeong MD [facility]                Passed - ALT is 94 or below and within 360 days     ALT   Date Value Ref Range Status   09/24/2019 17 10 - 44 U/L Final   10/08/2018 15 10 - 44 U/L Final   07/31/2017 15 10 - 44 U/L Final              Passed - AST is 54 or below and within 360 days     AST   Date Value Ref Range Status   09/24/2019 28 10 - 40 U/L Final   10/08/2018 27 10 - 40 U/L  Final   07/31/2017 23 10 - 40 U/L Final                 Appointments  past 12m or future 3m with PCP    Date Provider   Last Visit   2/14/2020 Juan Francisco Jeong MD   Next Visit   6/18/2020 Juan Francisco Jeong MD   ED visits in past 90 days: 0     Note composed:9:36 AM 05/14/2020

## 2020-05-18 RX ORDER — PRAVASTATIN SODIUM 20 MG/1
TABLET ORAL
Qty: 90 TABLET | Refills: 0 | Status: SHIPPED | OUTPATIENT
Start: 2020-05-18 | End: 2020-07-24

## 2020-05-18 NOTE — TELEPHONE ENCOUNTER
----- Message from Will Grimes sent at 5/18/2020  3:30 PM CDT -----  Type:  RX Refill Request    Who Called:  Patient  Refill or New Rx:  Refill  RX Name and Strength:  HYDROcodone-acetaminophen (NORCO) 5-325 mg per tablet  How is the patient currently taking it? (ex. 1XDay):   Take 1 tablet by mouth 2 (two) times daily as needed for Pain  Is this a 30 day or 90 day RX:  30    Preferred Pharmacy with phone number:   [id number] - [place] [address] & [address] [address]  Phone: [phone] Fax: [phone]      Local or Mail Order:  Local  Ordering Provider:  Jean-Paul Bowie Call Back Number: [phone]  Additional Information:

## 2020-05-20 RX ORDER — HYDROCODONE BITARTRATE AND ACETAMINOPHEN 5; 325 MG/1; MG/1
1 TABLET ORAL 2 TIMES DAILY PRN
Qty: 60 TABLET | Refills: 0 | Status: SHIPPED | OUTPATIENT
Start: 2020-05-20 | End: 2020-06-18 | Stop reason: SDUPTHER

## 2020-05-26 NOTE — TELEPHONE ENCOUNTER
Provider Staff:     Please schedule patient for Labs (lipids)    Please also check with your provider if any further labs need to be added and scheduled together.    Thanks!  [facility]     Appointments  past 12m or future 3m with PCP    Date Provider   Last Visit   2/14/2020 Juan Francisco Jeong MD   Next Visit   5/18/2020 Juan Francisco Jeong MD     Note composed:7:52 AM 05/26/2020

## 2020-06-18 ENCOUNTER — LAB VISIT (OUTPATIENT)
Dept: LAB | Facility: HOSPITAL | Age: 75
End: 2020-06-18
Attending: FAMILY MEDICINE
Payer: MEDICARE

## 2020-06-18 ENCOUNTER — OFFICE VISIT (OUTPATIENT)
Dept: FAMILY MEDICINE | Facility: CLINIC | Age: 75
End: 2020-06-18
Payer: MEDICARE

## 2020-06-18 VITALS
TEMPERATURE: 98 F | HEIGHT: 62 IN | WEIGHT: 121.94 LBS | DIASTOLIC BLOOD PRESSURE: 64 MMHG | OXYGEN SATURATION: 98 % | HEART RATE: 63 BPM | BODY MASS INDEX: 22.44 KG/M2 | SYSTOLIC BLOOD PRESSURE: 118 MMHG

## 2020-06-18 DIAGNOSIS — F32.A DEPRESSION, UNSPECIFIED DEPRESSION TYPE: ICD-10-CM

## 2020-06-18 DIAGNOSIS — I10 ESSENTIAL HYPERTENSION: Primary | ICD-10-CM

## 2020-06-18 DIAGNOSIS — M54.16 LUMBAR RADICULOPATHY: ICD-10-CM

## 2020-06-18 DIAGNOSIS — G47.00 INSOMNIA, UNSPECIFIED TYPE: ICD-10-CM

## 2020-06-18 DIAGNOSIS — F41.9 ANXIETY: ICD-10-CM

## 2020-06-18 DIAGNOSIS — I10 ESSENTIAL HYPERTENSION: ICD-10-CM

## 2020-06-18 LAB
ALBUMIN SERPL BCP-MCNC: 4.5 G/DL (ref 3.5–5.2)
ALP SERPL-CCNC: 88 U/L (ref 55–135)
ALT SERPL W/O P-5'-P-CCNC: 13 U/L (ref 10–44)
ANION GAP SERPL CALC-SCNC: 9 MMOL/L (ref 8–16)
AST SERPL-CCNC: 28 U/L (ref 10–40)
BILIRUB SERPL-MCNC: 0.5 MG/DL (ref 0.1–1)
BUN SERPL-MCNC: 10 MG/DL (ref 8–23)
CALCIUM SERPL-MCNC: 9.3 MG/DL (ref 8.7–10.5)
CHLORIDE SERPL-SCNC: 93 MMOL/L (ref 95–110)
CHOLEST SERPL-MCNC: 165 MG/DL (ref 120–199)
CHOLEST/HDLC SERPL: 2.5 {RATIO} (ref 2–5)
CO2 SERPL-SCNC: 25 MMOL/L (ref 23–29)
CREAT SERPL-MCNC: 1 MG/DL (ref 0.5–1.4)
EST. GFR  (AFRICAN AMERICAN): >60 ML/MIN/1.73 M^2
EST. GFR  (NON AFRICAN AMERICAN): 55.2 ML/MIN/1.73 M^2
GLUCOSE SERPL-MCNC: 101 MG/DL (ref 70–110)
HDLC SERPL-MCNC: 65 MG/DL (ref 40–75)
HDLC SERPL: 39.4 % (ref 20–50)
LDLC SERPL CALC-MCNC: 82 MG/DL (ref 63–159)
NONHDLC SERPL-MCNC: 100 MG/DL
POTASSIUM SERPL-SCNC: 4.4 MMOL/L (ref 3.5–5.1)
PROT SERPL-MCNC: 7.7 G/DL (ref 6–8.4)
SODIUM SERPL-SCNC: 127 MMOL/L (ref 136–145)
TRIGL SERPL-MCNC: 90 MG/DL (ref 30–150)

## 2020-06-18 PROCEDURE — 3078F PR MOST RECENT DIASTOLIC BLOOD PRESSURE < 80 MM HG: ICD-10-PCS | Mod: HCNC,CPTII,S$GLB, | Performed by: FAMILY MEDICINE

## 2020-06-18 PROCEDURE — 1101F PT FALLS ASSESS-DOCD LE1/YR: CPT | Mod: HCNC,CPTII,S$GLB, | Performed by: FAMILY MEDICINE

## 2020-06-18 PROCEDURE — 36415 COLL VENOUS BLD VENIPUNCTURE: CPT | Mod: HCNC,PO

## 2020-06-18 PROCEDURE — 1159F PR MEDICATION LIST DOCUMENTED IN MEDICAL RECORD: ICD-10-PCS | Mod: HCNC,S$GLB,, | Performed by: FAMILY MEDICINE

## 2020-06-18 PROCEDURE — 99999 PR PBB SHADOW E&M-EST. PATIENT-LVL V: ICD-10-PCS | Mod: PBBFAC,HCNC,, | Performed by: FAMILY MEDICINE

## 2020-06-18 PROCEDURE — 99214 PR OFFICE/OUTPT VISIT, EST, LEVL IV, 30-39 MIN: ICD-10-PCS | Mod: HCNC,S$GLB,, | Performed by: FAMILY MEDICINE

## 2020-06-18 PROCEDURE — 1101F PR PT FALLS ASSESS DOC 0-1 FALLS W/OUT INJ PAST YR: ICD-10-PCS | Mod: HCNC,CPTII,S$GLB, | Performed by: FAMILY MEDICINE

## 2020-06-18 PROCEDURE — 99999 PR PBB SHADOW E&M-EST. PATIENT-LVL V: CPT | Mod: PBBFAC,HCNC,, | Performed by: FAMILY MEDICINE

## 2020-06-18 PROCEDURE — 3074F SYST BP LT 130 MM HG: CPT | Mod: HCNC,CPTII,S$GLB, | Performed by: FAMILY MEDICINE

## 2020-06-18 PROCEDURE — 80053 COMPREHEN METABOLIC PANEL: CPT | Mod: HCNC

## 2020-06-18 PROCEDURE — 80061 LIPID PANEL: CPT | Mod: HCNC

## 2020-06-18 PROCEDURE — 3078F DIAST BP <80 MM HG: CPT | Mod: HCNC,CPTII,S$GLB, | Performed by: FAMILY MEDICINE

## 2020-06-18 PROCEDURE — 1159F MED LIST DOCD IN RCRD: CPT | Mod: HCNC,S$GLB,, | Performed by: FAMILY MEDICINE

## 2020-06-18 PROCEDURE — 1126F PR PAIN SEVERITY QUANTIFIED, NO PAIN PRESENT: ICD-10-PCS | Mod: HCNC,S$GLB,, | Performed by: FAMILY MEDICINE

## 2020-06-18 PROCEDURE — 99214 OFFICE O/P EST MOD 30 MIN: CPT | Mod: HCNC,S$GLB,, | Performed by: FAMILY MEDICINE

## 2020-06-18 PROCEDURE — 3074F PR MOST RECENT SYSTOLIC BLOOD PRESSURE < 130 MM HG: ICD-10-PCS | Mod: HCNC,CPTII,S$GLB, | Performed by: FAMILY MEDICINE

## 2020-06-18 PROCEDURE — 1126F AMNT PAIN NOTED NONE PRSNT: CPT | Mod: HCNC,S$GLB,, | Performed by: FAMILY MEDICINE

## 2020-06-18 RX ORDER — HYDROCODONE BITARTRATE AND ACETAMINOPHEN 5; 325 MG/1; MG/1
1 TABLET ORAL 2 TIMES DAILY PRN
Qty: 60 TABLET | Refills: 0 | Status: SHIPPED | OUTPATIENT
Start: 2020-07-20 | End: 2020-08-19

## 2020-06-18 RX ORDER — HYDROCODONE BITARTRATE AND ACETAMINOPHEN 5; 325 MG/1; MG/1
1 TABLET ORAL 2 TIMES DAILY PRN
Qty: 60 TABLET | Refills: 0 | Status: SHIPPED | OUTPATIENT
Start: 2020-08-19 | End: 2020-08-27 | Stop reason: SDUPTHER

## 2020-06-18 RX ORDER — ESCITALOPRAM OXALATE 5 MG/1
5 TABLET ORAL DAILY
Qty: 30 TABLET | Refills: 11 | Status: SHIPPED | OUTPATIENT
Start: 2020-06-18 | End: 2021-01-04

## 2020-06-18 RX ORDER — ALPRAZOLAM 0.25 MG/1
0.25 TABLET ORAL 2 TIMES DAILY
Qty: 60 TABLET | Refills: 2 | Status: SHIPPED | OUTPATIENT
Start: 2020-06-18 | End: 2020-09-18

## 2020-06-18 RX ORDER — HYDROCODONE BITARTRATE AND ACETAMINOPHEN 5; 325 MG/1; MG/1
1 TABLET ORAL 2 TIMES DAILY PRN
Qty: 60 TABLET | Refills: 0 | Status: SHIPPED | OUTPATIENT
Start: 2020-06-20 | End: 2020-07-20

## 2020-06-18 RX ORDER — AMOXICILLIN 500 MG/1
CAPSULE ORAL
COMMUNITY
Start: 2020-05-14 | End: 2020-10-05 | Stop reason: ALTCHOICE

## 2020-06-18 NOTE — PROGRESS NOTES
Subjective:       Patient ID: Luis Miguel Murcia is a 75 y.o. female.    Chief Complaint: Depression and Hypertension    HPI     Here for a f/u.     htn is stable. No c/o cp or sob at rest or exertion. No headaches.      C/o chronic right lumbar radicular pain > 1 year. Ps: 2-3/10 while on norco. Reports mild relief from poppy L5-S1 in 7/5/2018. Has residual constant neuropathy from right calf to right foot which is worse at night.  Takes gabapentin 300 mg qhs for the neuropathy.      Also, has chronic neck pain > 1 year. Ps: 2-3/10 while on norco       Imaging:  C-spine MRI 9/30/10: There is anterolisthesis of C3 on 4 and 4 on 5 mile, retrolisthesis of C5 and 6. At C3/4 there is a mild bulge with foraminal narrowing bilaterally mild. At C5-C6 there is a disc bulge with right upper protrusion of the disc but severe foraminal narrowing on the right and moderate to severe on the left. There is disc bulging at C6/7 with a bulge more to the right.     MRI L-spine 5/24/2018: There is multilevel degenerative change discussed in detail above.  There is no fracture.  There is trace anterolisthesis of L4 on L5 which is degenerative in etiology and unchanged.  All of the discs are desiccated.  There is some degree of disc space narrowing, disc bulge with a without disc protrusions, facet joint arthropathy contributing to stenosis.  These findings will be summarized below. There is degenerative change but there is no spinal canal or significant foraminal stenosis at the T10-11, T11-12, T12-L1 or L1-2 levels. At the L2-3 level, there is mild spinal canal and left foraminal stenosis.  There is facet joint arthropathy.  There is a disc bulge with small left paracentral disc protrusion.  The disc protrusion has occurred since the prior study in the facet joint changes have progressed. At the L3-4 level, there is mild spinal stenosis with moderate left lateral recess stenosis.  Additionally, there is mild-to-moderate left and  mild right foraminal stenosis without definite change. At the L4-5 level, there is multifactorial moderate central spinal stenosis without any significant foraminal stenosis and without definite change. At the L5-S1 level, there is right greater than left facet joint arthropathy.  There is a disc bulge with osteophytic ridging and broad central disc protrusion but there is no significant spinal stenosis.  There is stable mild left and moderate-to-marked right foraminal stenosis.Anxiety stable while taking xanax.      Insomnia stable while on ambien.       Feeling sad over the past 3 months due to covid.       gerd stable while on dexilant and zantac.       Review of Systems      Review of Systems   Constitutional: Negative for fever and chills.   HENT: Negative for hearing loss and neck stiffness.    Eyes: Negative for redness and itching.   Respiratory: Negative for cough and choking.    Cardiovascular: Negative for chest pain and leg swelling.  Abdomen: Negative for abdominal pain and blood in stool.   Genitourinary: Negative for dysuria and flank pain.   Musculoskeletal: Negative for  gait problem.   Neurological: Negative for light-headedness and headaches.   Hematological: Negative for adenopathy.       Objective:      Physical Exam  Constitutional:       Appearance: She is well-developed.   HENT:      Head: Normocephalic and atraumatic.   Eyes:      Conjunctiva/sclera: Conjunctivae normal.      Pupils: Pupils are equal, round, and reactive to light.   Neck:      Musculoskeletal: Normal range of motion.   Cardiovascular:      Rate and Rhythm: Normal rate and regular rhythm.      Heart sounds: No murmur.   Pulmonary:      Effort: Pulmonary effort is normal.      Breath sounds: Normal breath sounds.   Lymphadenopathy:      Cervical: No cervical adenopathy.         Assessment:       1. Essential hypertension    2. Anxiety    3. Depression, unspecified depression type    4. Lumbar radiculopathy    5. Insomnia,  unspecified type        Plan:       Essential hypertension  -     Comprehensive metabolic panel; Future  -     Lipid Panel; Future    Anxiety  -     ALPRAZolam (XANAX) 0.25 MG tablet; Take 1 tablet (0.25 mg total) by mouth 2 (two) times daily. as needed for anxiety.  Dispense: 60 tablet; Refill: 2    Depression, unspecified depression type    Lumbar radiculopathy    Insomnia, unspecified type    Other orders  -     escitalopram oxalate (LEXAPRO) 5 MG Tab; Take 1 tablet (5 mg total) by mouth once daily.  Dispense: 30 tablet; Refill: 11  -     HYDROcodone-acetaminophen (NORCO) 5-325 mg per tablet; Take 1 tablet by mouth 2 (two) times daily as needed for Pain.  Dispense: 60 tablet; Refill: 0  -     HYDROcodone-acetaminophen (NORCO) 5-325 mg per tablet; Take 1 tablet by mouth 2 (two) times daily as needed for Pain.  Dispense: 60 tablet; Refill: 0  -     HYDROcodone-acetaminophen (NORCO) 5-325 mg per tablet; Take 1 tablet by mouth 2 (two) times daily as needed for Pain.  Dispense: 60 tablet; Refill: 0                Plan:  Start lexapro  Cont all other meds  See orders        Medication List with Changes/Refills   New Medications    ESCITALOPRAM OXALATE (LEXAPRO) 5 MG TAB    Take 1 tablet (5 mg total) by mouth once daily.    HYDROCODONE-ACETAMINOPHEN (NORCO) 5-325 MG PER TABLET    Take 1 tablet by mouth 2 (two) times daily as needed for Pain.    HYDROCODONE-ACETAMINOPHEN (NORCO) 5-325 MG PER TABLET    Take 1 tablet by mouth 2 (two) times daily as needed for Pain.   Current Medications    ALBUTEROL (VENTOLIN HFA) 90 MCG/ACTUATION INHALER    INHALE 2 PUFFS INTO THE LUNGS EVERY 6 HOURS AS NEEDED FOR WHEEZING    AMLODIPINE (NORVASC) 5 MG TABLET    TAKE 1 TABLET(5 MG) BY MOUTH EVERY DAY    AMOXICILLIN (AMOXIL) 500 MG CAPSULE        ASCORBATE CALCIUM (VITAMIN C ORAL)    Take 1,000 mg by mouth once daily.     ASPIRIN (ECOTRIN) 81 MG EC TABLET    Take 81 mg by mouth once daily.    AZELASTINE (ASTELIN) 137 MCG (0.1 %) NASAL SPRAY     USE 2 SPRAYS IN EACH NOSTRIL EVERY DAY. MAY USE UP TO 2 TIMES DAILY DEPENDING ON SYMPTOMS    B COMPLEX VITAMINS TABLET    Take 1 tablet by mouth once daily.      CALCIUM CITRATE-VITAMIN D3 ORAL        CARVEDILOL (COREG) 12.5 MG TABLET    TAKE 1 TABLET(12.5 MG) BY MOUTH TWICE DAILY WITH MEALS    CO-ENZYME Q-10 30 MG CAPSULE        DEXILANT 60 MG CAPSULE    TAKE 1 CAPSULE(60 MG) BY MOUTH EVERY MORNING BEFORE BREAKFAST    DOXYCYCLINE (MONODOX) 100 MG CAPSULE    Take 1 capsule (100 mg total) by mouth 2 (two) times daily.    FAMOTIDINE (PEPCID) 20 MG TABLET    TAKE 1 TABLET BY MOUTH TWICE DAILY    FLUTICASONE PROPIONATE (FLONASE) 50 MCG/ACTUATION NASAL SPRAY    SHAKE LIQUID AND USE 2 SPRAYS(100 MCG) IN EACH NOSTRIL EVERY DAY    GABAPENTIN (NEURONTIN) 600 MG TABLET    TAKE 1 TABLET(600 MG) BY MOUTH EVERY EVENING    IBANDRONATE (BONIVA) 150 MG TABLET    Take 1 tablet (150 mg total) by mouth every 30 days.    ONDANSETRON (ZOFRAN-ODT) 4 MG TBDL    DIS 1 T UNT Q 4 H PRN N    PRAVASTATIN (PRAVACHOL) 20 MG TABLET    TAKE 1 TABLET(20 MG) BY MOUTH EVERY NIGHT    PREDNISONE (DELTASONE) 10 MG TABLET    Take 4 tabs daily for 2 days then Take 3 tabs daily for 2 days thenTake 2 tabs daily for 2 days then Take 1 tab daily for 2 days then stop    VITAMIN B-12 1000 MCG TABLET    Take 1,000 mcg by mouth once daily.     ZOLPIDEM (AMBIEN) 10 MG TAB    TAKE 1 TABLET(10 MG) BY MOUTH EVERY NIGHT   Changed and/or Refilled Medications    Modified Medication Previous Medication    ALPRAZOLAM (XANAX) 0.25 MG TABLET ALPRAZolam (XANAX) 0.25 MG tablet       Take 1 tablet (0.25 mg total) by mouth 2 (two) times daily. as needed for anxiety.    TAKE 1 TABLET BY MOUTH TWICE DAILY AS NEEDED FOR ANXIETY    HYDROCODONE-ACETAMINOPHEN (NORCO) 5-325 MG PER TABLET HYDROcodone-acetaminophen (NORCO) 5-325 mg per tablet       Take 1 tablet by mouth 2 (two) times daily as needed for Pain.    Take 1 tablet by mouth 2 (two) times daily as needed for Pain.

## 2020-07-13 NOTE — PROGRESS NOTES
Refill Routing Note   Medication(s) are not appropriate for processing by [facility]:       - patient allergy        Medication Therapy Plan: Allergy/Contraindication: predniSONE, fluticasone propionate; DRUG CLASS MATCH with TRIAMCINOLONE ACETONIDE (Class: CORTICOSTEROIDS (GLUCOCORTICOIDS); Defer to you  Medication reconciliation completed: No      Automatic Epic Generated Protocol Data:    Requested Prescriptions   Pending Prescriptions Disp Refills    fluticasone propionate (FLONASE) 50 mcg/actuation nasal spray [Pharmacy Med Name: FLUTICASONE 50MCG NASAL SP (120) RX] 48 g 0     Sig: SHAKE LIQUID AND USE 2 SPRAYS(100 MCG) IN EACH NOSTRIL EVERY DAY       Ear, Nose, and Throat: Nasal Preparations - Corticosteroids Failed - 7/12/2020  5:25 PM        Failed - An appropriate indication is on the problem list     Allergic Rhinitis  Sinusitis  Seasonal Allergies              Passed - Patient is at least 18 years old        Passed - Office visit in past 12 months or future 90 days.     Recent Outpatient Visits            3 weeks ago Essential hypertension    [facility] Juan Francisco Jeong MD    5 months ago Essential hypertension    [facility] Juan Francisco Jeong MD    5 months ago Seborrheic keratoses    [facility] Sudha Marie MD    8 months ago Essential hypertension    [facility] Juan Francisco Jeong MD    8 months ago Purpura    [facility] Sudha Marie MD          Future Appointments              In 2 months Juan Francisco Jeong MD [facility]                      Appointments  past 12m or future 3m with PCP    Date Provider   Last Visit   6/18/2020 Juan Francisco Jeong MD   Next Visit   9/21/2020 Juan Francisco Jeong MD   ED visits in past 90 days: 0     Note composed:2:56 PM 07/13/2020

## 2020-07-15 RX ORDER — FLUTICASONE PROPIONATE 50 MCG
SPRAY, SUSPENSION (ML) NASAL
Qty: 48 G | Refills: 0 | Status: SHIPPED | OUTPATIENT
Start: 2020-07-15 | End: 2020-10-27

## 2020-07-19 DIAGNOSIS — E87.1 HYPONATREMIA: Primary | ICD-10-CM

## 2020-07-20 NOTE — PROGRESS NOTES
inform pt via phone that I reviewed the test results and note the following:    Sodium is low. Needs to come in for repeat sodium check and urine sodium check

## 2020-07-24 RX ORDER — PRAVASTATIN SODIUM 20 MG/1
TABLET ORAL
Qty: 90 TABLET | Refills: 3 | Status: SHIPPED | OUTPATIENT
Start: 2020-07-24 | End: 2021-08-09

## 2020-07-24 NOTE — TELEPHONE ENCOUNTER
No new care gaps identified.  Powered by [employer]. Reference number: [id number]. 7/23/2020 9:57:33 PM CDT

## 2020-07-25 NOTE — TELEPHONE ENCOUNTER
Refill Routing Note   Medication(s) are not appropriate for processing by [facility]:       - Outside of protocol        Medication Therapy Plan: CDMR. Gabapentin outside of protocol  Medication reconciliation completed: No      Automatic Epic Generated Protocol Data:    Orders Placed This Encounter    pravastatin (PRAVACHOL) 20 MG tablet      Requested Prescriptions   Pending Prescriptions Disp Refills    gabapentin (NEURONTIN) 600 MG tablet [Pharmacy Med Name: GABAPENTIN 600MG TABLETS] 30 tablet 2     Sig: TAKE 1 TABLET(600 MG) BY MOUTH EVERY EVENING       Anticonvulsants Protocol Passed - 7/23/2020 10:12 PM        Passed - Visit with Authorizing provider in past 9 months or upcoming 90 days        Passed - No active pregnancy on record         Signed Prescriptions Disp Refills    pravastatin (PRAVACHOL) 20 MG tablet 90 tablet 3     Sig: TAKE 1 TABLET(20 MG) BY MOUTH EVERY NIGHT       Cardiovascular:  Antilipid - Statins Passed - 7/23/2020 10:12 PM        Passed - Patient is at least 18 years old        Passed - Office visit in past 12 months or future 90 days.     Recent Outpatient Visits            1 month ago Essential hypertension    [facility] Juan Francisco Jeong MD    5 months ago Essential hypertension    [facility] Juan Francisco Jeong MD    6 months ago Seborrheic keratoses    [facility] Sudha Marie MD    8 months ago Essential hypertension    [facility] Juan Francisco Jeong MD    9 months ago Purpura    [facility] Sudha Marie MD          Future Appointments              In 1 month URINE [facility] [place]    In 1 month LAB, [place] [facility] [place]    In 1 month Juan Francisco Jeong MD [facility]                Passed - Lipid Panel completed in last 360 days     Lab Results   Component Value Date    CHOL 165 06/18/2020    HDL 65  06/18/2020    LDLCALC 82.0 06/18/2020    TRIG 90 06/18/2020             Passed - ALT is 94 or below and within 360 days     ALT   Date Value Ref Range Status   06/18/2020 13 10 - 44 U/L Final   09/24/2019 17 10 - 44 U/L Final   10/08/2018 15 10 - 44 U/L Final              Passed - AST is 54 or below and within 360 days     AST   Date Value Ref Range Status   06/18/2020 28 10 - 40 U/L Final   09/24/2019 28 10 - 40 U/L Final   10/08/2018 27 10 - 40 U/L Final                    Appointments  past 12m or future 3m with PCP    Date Provider   Last Visit   6/18/2020 Juan Francisco Jeong MD   Next Visit   9/21/2020 Jua nFrancisco Jeong MD   ED visits in past 90 days: [unfilled]     Note composed:10:22 PM 07/24/2020

## 2020-07-26 RX ORDER — GABAPENTIN 600 MG/1
TABLET ORAL
Qty: 30 TABLET | Refills: 2 | Status: SHIPPED | OUTPATIENT
Start: 2020-07-26 | End: 2020-10-28

## 2020-08-05 ENCOUNTER — TELEPHONE (OUTPATIENT)
Dept: FAMILY MEDICINE | Facility: CLINIC | Age: 75
End: 2020-08-05

## 2020-08-05 NOTE — TELEPHONE ENCOUNTER
----- Message from Idania Guillen sent at 8/5/2020  2:31 PM CDT -----  Regarding: advice  Contact: self  Type: Needs Medical Advice  Who Called:  self  Symptoms (please be specific):    How long has patient had these symptoms: Pharmacy name and phone #:    Best Call Back Number: [phone]  Additional Information: Patient states she want to wean off rx for depression. Patient states the rx make the patient feel lazy,not wanting to do anything. Patient doesn't like feeling lazy.

## 2020-08-10 ENCOUNTER — TELEPHONE (OUTPATIENT)
Dept: FAMILY MEDICINE | Facility: CLINIC | Age: 75
End: 2020-08-10

## 2020-08-10 NOTE — TELEPHONE ENCOUNTER
attempted to contact pt, left message to return call to clinic to give provider's recommendations.

## 2020-08-10 NOTE — TELEPHONE ENCOUNTER
Wean off lexapro x 3 weeks.    Take 1/2 tab daily x 7 days, then 1/2 tab every 2nd day x 7 days and then 1/2 tab every 3rd x 7 days.

## 2020-08-10 NOTE — TELEPHONE ENCOUNTER
----- Message from Stacy Rocha sent at 8/10/2020 10:43 AM CDT -----  Regarding: advice  Contact: JUAN FRANCISCO CHOWDHURY [[phone]]  Patient is requesting a call back from the nurse stated she want to wean off escitalopram oxalate (LEXAPRO).    Please call the patient upon request at phone number [phone].

## 2020-08-16 NOTE — TELEPHONE ENCOUNTER
No new care gaps identified.  Powered by [employer]. Reference number: [id number]. 8/16/2020 1:25:54 PM CDT

## 2020-08-17 RX ORDER — ALBUTEROL SULFATE 90 UG/1
AEROSOL, METERED RESPIRATORY (INHALATION)
Qty: 54 G | Refills: 3 | Status: SHIPPED | OUTPATIENT
Start: 2020-08-17 | End: 2021-08-24

## 2020-08-17 NOTE — PROGRESS NOTES
Refill Authorization Note    is requesting a refill authorization.    Brief assessment and rationale for refill: ROUTE: op(ambien) // APPROVE: prr (albuterol)           Medication Therapy Plan: CDMR. approve 12 more for albuterol; route ambien    Medication reconciliation completed: No                         Comments:      Orders Placed This Encounter    albuterol (PROVENTIL/VENTOLIN HFA) 90 mcg/actuation inhaler      Requested Prescriptions   Pending Prescriptions Disp Refills    zolpidem (AMBIEN) 10 mg Tab [Pharmacy Med Name: ZOLPIDEM 10MG TABLETS] 30 tablet      Sig: TAKE 1 TABLET(10 MG) BY MOUTH EVERY NIGHT       There is no refill protocol information for this order      Signed Prescriptions Disp Refills    albuterol (PROVENTIL/VENTOLIN HFA) 90 mcg/actuation inhaler 54 g 3     Sig: INHALE 2 PUFFS BY MOUTH INTO THE LUNGS EVERY 6 HOURS AS NEEDED FOR WHEEZING       Pulmonology:  Beta Agonists Failed - 8/17/2020 12:22 PM        Failed - Patient has asthma or COPD and there is a controller medication on the active medication list        Failed - Manual Review: If patient with asthma requests more than 1 inhaler every 3 months, assess for uncontrolled symptoms and/or notify provider.        Passed - Patient is at least 18 years old        Passed - Last Heart Rate in normal range within 360 days.     Pulse Readings from Last 3 Encounters:   06/18/20 63   02/14/20 60   11/08/19 54             Passed - Office visit in past 12 months or future 90 days.     Recent Outpatient Visits            2 months ago Essential hypertension    [facility] Juan Francisco Jeong MD    6 months ago Essential hypertension    [facility] Juan Francisco Jeong MD    7 months ago Seborrheic keratoses    [facility] Sudha Marie MD    9 months ago Essential hypertension    [facility] Juan Francisco Jeong MD    10 months ago Purpura    [place] [facility] Sudha SOUZA  MD Frank          Future Appointments              In 4 weeks URINE [facility] [place]    In 4 weeks [place], [place] [facility] [place]    In 1 month [facility] [facility]                    Appointments  past 12m or future 3m with PCP    Date Provider   Last Visit   6/18/2020 Juan Francisco Jeong MD   Next Visit   9/21/2020 Juan Francisco Jeong MD   ED visits in past 90 days: 0     Note composed:12:23 PM 08/17/2020

## 2020-08-19 RX ORDER — ZOLPIDEM TARTRATE 10 MG/1
TABLET ORAL
Qty: 30 TABLET | Refills: 5 | Status: SHIPPED | OUTPATIENT
Start: 2020-08-19 | End: 2021-02-10 | Stop reason: SDUPTHER

## 2020-08-27 ENCOUNTER — TELEPHONE (OUTPATIENT)
Dept: FAMILY MEDICINE | Facility: CLINIC | Age: 75
End: 2020-08-27

## 2020-08-27 NOTE — TELEPHONE ENCOUNTER
----- Message from Vickie Randolph sent at 8/27/2020  2:57 PM CDT -----  Type:  RX Refill Request    Who Called:  Luis Miguel  [facility] or New Rx:  refill  RX Name and Strength:  HYDROcodone-acetaminophen (NORCO) 5-325 mg per tablet  How is the patient currently taking it? (ex. 1XDay):  2 times daily  Is this a 30 day or 90 day RX:  30  Preferred Pharmacy with phone number:    [id number] - [place] [address] [address] [place] [address] [place] [id number]  Phone: [phone] Fax: [phone]  Local or Mail Order:  local  Ordering Provider:  Dr Jean-Paul Bowie Call Back Number:  [phone]  Additional Information:  The last time your ordered them it was for 2 months so she needs one more fill and she will see you on 9/21. thank you!

## 2020-08-30 RX ORDER — HYDROCODONE BITARTRATE AND ACETAMINOPHEN 5; 325 MG/1; MG/1
1 TABLET ORAL 2 TIMES DAILY PRN
Qty: 60 TABLET | Refills: 0 | Status: SHIPPED | OUTPATIENT
Start: 2020-08-30 | End: 2020-09-21 | Stop reason: SDUPTHER

## 2020-09-16 DIAGNOSIS — F41.9 ANXIETY: ICD-10-CM

## 2020-09-17 NOTE — PROGRESS NOTES
Refill Routing Note   Medication(s) are not appropriate for processing by [facility]:       - Outside of protocol           Medication reconciliation completed: No      Automatic Epic Generated Protocol Data:        Requested Prescriptions   Pending Prescriptions Disp Refills    ALPRAZolam (XANAX) 0.25 MG tablet [Pharmacy Med Name: ALPRAZOLAM 0.25MG TABLETS] 60 tablet      Sig: TAKE 1 TABLET(0.25 MG) BY MOUTH TWICE DAILY AS NEEDED FOR ANXIETY       Off-Protocol Failed - 9/16/2020  9:19 PM        Failed - Medication not assigned to a protocol, review manually.        Passed - Office visit in past 12 months or future 90 days.     Recent Outpatient Visits            3 months ago Essential hypertension    [facility] Juan Francisco Jeong MD    7 months ago Essential hypertension    [facility] Juan Francisco Jeong MD    8 months ago Seborrheic keratoses    [facility] Sudha Marie MD    10 months ago Essential hypertension    [facility] Juan Francisco Jeong MD    11 months ago Purpura    [facility] Sudha Marie MD          Future Appointments              Tomorrow URINE [facility] [place]    Tomorrow LAB, [place] [facility] [place]    In 4 days Sudha Marie MD [facility]    In 4 days Juan Francisco Jeong MD [facility]                      Appointments  past 12m or future 3m with PCP    Date Provider   Last Visit   6/18/2020 Juan Francisco Jeong MD   Next Visit   9/21/2020 Juan Francisco Jeong MD   ED visits in past 90 days: 0     Note composed:6:18 AM 09/17/2020

## 2020-09-18 ENCOUNTER — PATIENT OUTREACH (OUTPATIENT)
Dept: ADMINISTRATIVE | Facility: OTHER | Age: 75
End: 2020-09-18

## 2020-09-18 ENCOUNTER — LAB VISIT (OUTPATIENT)
Dept: LAB | Facility: HOSPITAL | Age: 75
End: 2020-09-18
Attending: FAMILY MEDICINE
Payer: MEDICARE

## 2020-09-18 DIAGNOSIS — E87.1 HYPONATREMIA: ICD-10-CM

## 2020-09-18 PROCEDURE — 83935 ASSAY OF URINE OSMOLALITY: CPT | Mod: HCNC

## 2020-09-18 PROCEDURE — 84300 ASSAY OF URINE SODIUM: CPT | Mod: HCNC

## 2020-09-18 RX ORDER — ALPRAZOLAM 0.25 MG/1
TABLET ORAL
Qty: 60 TABLET | Refills: 2 | Status: SHIPPED | OUTPATIENT
Start: 2020-09-18 | End: 2020-12-22 | Stop reason: SDUPTHER

## 2020-09-18 NOTE — PROGRESS NOTES
Updates were requested from care everywhere.  Chart was reviewed for overdue [facility] Encounters (IS[facility]) topics (CRS, Breast Cancer Screening, Eye exam)  Health Maintenance has been updated.  LINKS immunization registry triggered.  Immunizations were reconciled.

## 2020-09-19 LAB
OSMOLALITY UR: 159 MOSM/KG (ref 50–1200)
SODIUM UR-SCNC: <20 MMOL/L (ref 20–250)

## 2020-09-21 ENCOUNTER — OFFICE VISIT (OUTPATIENT)
Dept: FAMILY MEDICINE | Facility: CLINIC | Age: 75
End: 2020-09-21
Payer: MEDICARE

## 2020-09-21 ENCOUNTER — OFFICE VISIT (OUTPATIENT)
Dept: DERMATOLOGY | Facility: CLINIC | Age: 75
End: 2020-09-21
Payer: MEDICARE

## 2020-09-21 VITALS
TEMPERATURE: 98 F | DIASTOLIC BLOOD PRESSURE: 70 MMHG | BODY MASS INDEX: 22.84 KG/M2 | OXYGEN SATURATION: 94 % | SYSTOLIC BLOOD PRESSURE: 126 MMHG | HEIGHT: 62 IN | WEIGHT: 124.13 LBS | HEART RATE: 60 BPM

## 2020-09-21 VITALS — HEIGHT: 62 IN | RESPIRATION RATE: 16 BRPM | BODY MASS INDEX: 22.3 KG/M2

## 2020-09-21 DIAGNOSIS — E87.1 HYPONATREMIA: Primary | ICD-10-CM

## 2020-09-21 DIAGNOSIS — K21.9 GASTROESOPHAGEAL REFLUX DISEASE, ESOPHAGITIS PRESENCE NOT SPECIFIED: ICD-10-CM

## 2020-09-21 DIAGNOSIS — L98.9 DISORDER OF SKIN AND SUBCUTANEOUS TISSUE: Primary | ICD-10-CM

## 2020-09-21 DIAGNOSIS — J32.9 SINUSITIS, UNSPECIFIED CHRONICITY, UNSPECIFIED LOCATION: ICD-10-CM

## 2020-09-21 DIAGNOSIS — M54.16 LUMBAR RADICULOPATHY: ICD-10-CM

## 2020-09-21 DIAGNOSIS — G47.00 INSOMNIA, UNSPECIFIED TYPE: ICD-10-CM

## 2020-09-21 DIAGNOSIS — I10 ESSENTIAL HYPERTENSION: ICD-10-CM

## 2020-09-21 PROCEDURE — 88305 TISSUE EXAM BY PATHOLOGIST: ICD-10-PCS | Mod: 26,HCNC,, | Performed by: PATHOLOGY

## 2020-09-21 PROCEDURE — 11104 PUNCH BX SKIN SINGLE LESION: CPT | Mod: PBBFAC,PO | Performed by: DERMATOLOGY

## 2020-09-21 PROCEDURE — 99999 PR PBB SHADOW E&M-EST. PATIENT-LVL V: ICD-10-PCS | Mod: PBBFAC,,, | Performed by: FAMILY MEDICINE

## 2020-09-21 PROCEDURE — 88312 SPECIAL STAINS GROUP 1: CPT | Mod: 26,HCNC,, | Performed by: PATHOLOGY

## 2020-09-21 PROCEDURE — 99999 PR PBB SHADOW E&M-EST. PATIENT-LVL II: ICD-10-PCS | Mod: PBBFAC,,, | Performed by: DERMATOLOGY

## 2020-09-21 PROCEDURE — 99999 PR PBB SHADOW E&M-EST. PATIENT-LVL V: CPT | Mod: PBBFAC,,, | Performed by: FAMILY MEDICINE

## 2020-09-21 PROCEDURE — 99499 NO LOS: ICD-10-PCS | Mod: S$PBB,,, | Performed by: DERMATOLOGY

## 2020-09-21 PROCEDURE — 88312 SPECIAL STAINS GROUP 1: CPT | Mod: HCNC | Performed by: PATHOLOGY

## 2020-09-21 PROCEDURE — 11104 PR PUNCH BIOPSY, SKIN, SINGLE LESION: ICD-10-PCS | Mod: S$PBB,,, | Performed by: DERMATOLOGY

## 2020-09-21 PROCEDURE — 88312 PR  SPECIAL STAINS,GROUP I: ICD-10-PCS | Mod: 26,HCNC,, | Performed by: PATHOLOGY

## 2020-09-21 PROCEDURE — 99215 OFFICE O/P EST HI 40 MIN: CPT | Mod: PBBFAC,PO | Performed by: FAMILY MEDICINE

## 2020-09-21 PROCEDURE — 88342 IMHCHEM/IMCYTCHM 1ST ANTB: CPT | Mod: 26,HCNC,, | Performed by: PATHOLOGY

## 2020-09-21 PROCEDURE — 88305 TISSUE EXAM BY PATHOLOGIST: CPT | Mod: 26,HCNC,, | Performed by: PATHOLOGY

## 2020-09-21 PROCEDURE — 99499 RISK ADDL DX/OHS AUDIT: ICD-10-PCS | Mod: HCNC,S$GLB,, | Performed by: FAMILY MEDICINE

## 2020-09-21 PROCEDURE — 99499 UNLISTED E&M SERVICE: CPT | Mod: S$PBB,,, | Performed by: DERMATOLOGY

## 2020-09-21 PROCEDURE — 11104 PUNCH BX SKIN SINGLE LESION: CPT | Mod: S$PBB,,, | Performed by: DERMATOLOGY

## 2020-09-21 PROCEDURE — 88305 TISSUE EXAM BY PATHOLOGIST: CPT | Mod: HCNC | Performed by: PATHOLOGY

## 2020-09-21 PROCEDURE — 99214 PR OFFICE/OUTPT VISIT, EST, LEVL IV, 30-39 MIN: ICD-10-PCS | Mod: S$PBB,,, | Performed by: FAMILY MEDICINE

## 2020-09-21 PROCEDURE — 99999 PR PBB SHADOW E&M-EST. PATIENT-LVL II: CPT | Mod: PBBFAC,,, | Performed by: DERMATOLOGY

## 2020-09-21 PROCEDURE — 99499 UNLISTED E&M SERVICE: CPT | Mod: HCNC,S$GLB,, | Performed by: FAMILY MEDICINE

## 2020-09-21 PROCEDURE — 99212 OFFICE O/P EST SF 10 MIN: CPT | Mod: PBBFAC,27,PO,25 | Performed by: DERMATOLOGY

## 2020-09-21 PROCEDURE — 99214 OFFICE O/P EST MOD 30 MIN: CPT | Mod: S$PBB,,, | Performed by: FAMILY MEDICINE

## 2020-09-21 PROCEDURE — 88342 CHG IMMUNOCYTOCHEMISTRY: ICD-10-PCS | Mod: 26,HCNC,, | Performed by: PATHOLOGY

## 2020-09-21 RX ORDER — HYDROCODONE BITARTRATE AND ACETAMINOPHEN 5; 325 MG/1; MG/1
1 TABLET ORAL 2 TIMES DAILY PRN
Qty: 60 TABLET | Refills: 0 | Status: SHIPPED | OUTPATIENT
Start: 2020-10-29 | End: 2020-11-28

## 2020-09-21 RX ORDER — PREDNISONE 10 MG/1
TABLET ORAL
Qty: 20 TABLET | Refills: 0 | Status: SHIPPED | OUTPATIENT
Start: 2020-09-21 | End: 2020-10-05 | Stop reason: ALTCHOICE

## 2020-09-21 RX ORDER — ZOSTER VACCINE RECOMBINANT, ADJUVANTED 50 MCG/0.5
KIT INTRAMUSCULAR
COMMUNITY
Start: 2020-06-18 | End: 2020-10-05 | Stop reason: ALTCHOICE

## 2020-09-21 RX ORDER — HYDROCODONE BITARTRATE AND ACETAMINOPHEN 5; 325 MG/1; MG/1
1 TABLET ORAL 2 TIMES DAILY PRN
Qty: 60 TABLET | Refills: 0 | Status: SHIPPED | OUTPATIENT
Start: 2020-09-29 | End: 2020-10-29

## 2020-09-21 RX ORDER — INFLUENZA A VIRUS A/MICHIGAN/45/2015 X-275 (H1N1) ANTIGEN (FORMALDEHYDE INACTIVATED), INFLUENZA A VIRUS A/SINGAPORE/INFIMH-16-0019/2016 IVR-186 (H3N2) ANTIGEN (FORMALDEHYDE INACTIVATED), INFLUENZA B VIRUS B/PHUKET/3073/2013 ANTIGEN (FORMALDEHYDE INACTIVATED), AND INFLUENZA B VIRUS B/MARYLAND/15/2016 BX-69A ANTIGEN (FORMALDEHYDE INACTIVATED) 60; 60; 60; 60 UG/.7ML; UG/.7ML; UG/.7ML; UG/.7ML
INJECTION, SUSPENSION INTRAMUSCULAR
COMMUNITY
Start: 2020-08-17 | End: 2020-10-05 | Stop reason: ALTCHOICE

## 2020-09-21 RX ORDER — HYDROCODONE BITARTRATE AND ACETAMINOPHEN 5; 325 MG/1; MG/1
1 TABLET ORAL 2 TIMES DAILY PRN
Qty: 60 TABLET | Refills: 0 | Status: SHIPPED | OUTPATIENT
Start: 2020-11-28 | End: 2020-12-28

## 2020-09-21 RX ORDER — DOXYCYCLINE 100 MG/1
100 CAPSULE ORAL 2 TIMES DAILY
Qty: 20 CAPSULE | Refills: 0 | Status: SHIPPED | OUTPATIENT
Start: 2020-09-21 | End: 2022-02-21

## 2020-09-21 NOTE — PROGRESS NOTES
Subjective:       Patient ID: Luis Miguel Murcia is a 75 y.o. female.    Chief Complaint: Hypertension    HPI     Here for a f/u.    Reviewed recent labs. Glucose was 44 at lab draw but patient reported no sxs of hypoglycemia. Mildly low serum sodium.     Has chronic nasal congestion. Uses neti pot in am.  Coughing up yellow phlegm with frontal sinus pressure x 2 weeks.      htn is stable. No c/o cp or sob at rest or exertion. No headaches.      C/o chronic right lumbar radicular pain > 1 year. Ps: 2-3/10 while on norco. Reports mild relief from poppy L5-S1 in 7/5/2018. Has residual constant neuropathy from right calf to right foot which is worse at night.  Takes gabapentin 300 mg qhs for the neuropathy.      Also, has chronic neck pain > 1 year. Ps: 2-3/10 while on norco       Imaging:  C-spine MRI 9/30/10: There is anterolisthesis of C3 on 4 and 4 on 5 mile, retrolisthesis of C5 and 6. At C3/4 there is a mild bulge with foraminal narrowing bilaterally mild. At C5-C6 there is a disc bulge with right upper protrusion of the disc but severe foraminal narrowing on the right and moderate to severe on the left. There is disc bulging at C6/7 with a bulge more to the right.     MRI L-spine 5/24/2018: There is multilevel degenerative change discussed in detail above.  There is no fracture.  There is trace anterolisthesis of L4 on L5 which is degenerative in etiology and unchanged.  All of the discs are desiccated.  There is some degree of disc space narrowing, disc bulge with a without disc protrusions, facet joint arthropathy contributing to stenosis.  These findings will be summarized below. There is degenerative change but there is no spinal canal or significant foraminal stenosis at the T10-11, T11-12, T12-L1 or L1-2 levels. At the L2-3 level, there is mild spinal canal and left foraminal stenosis.  There is facet joint arthropathy.  There is a disc bulge with small left paracentral disc protrusion.  The disc  protrusion has occurred since the prior study in the facet joint changes have progressed. At the L3-4 level, there is mild spinal stenosis with moderate left lateral recess stenosis.  Additionally, there is mild-to-moderate left and mild right foraminal stenosis without definite change. At the L4-5 level, there is multifactorial moderate central spinal stenosis without any significant foraminal stenosis and without definite change. At the L5-S1 level, there is right greater than left facet joint arthropathy.  There is a disc bulge with osteophytic ridging and broad central disc protrusion but there is no significant spinal stenosis.  There is stable mild left and moderate-to-marked right foraminal stenosis.Anxiety stable while taking xanax.      Insomnia stable while on ambien.      gerd stable while on dexilant and zantac.       Review of Systems      Review of Systems   Constitutional: Negative for fever and chills.   HENT: Negative for hearing loss and neck stiffness.    Eyes: Negative for redness and itching.   Respiratory: Negative for cough and choking.    Cardiovascular: Negative for chest pain and leg swelling.  Abdomen: Negative for abdominal pain and blood in stool.   Genitourinary: Negative for dysuria and flank pain.   Neurological: Negative for light-headedness and headaches.   Hematological: Negative for adenopathy.       Objective:      Physical Exam  Constitutional:       Appearance: She is well-developed.   HENT:      Head: Normocephalic and atraumatic.   Eyes:      Conjunctiva/sclera: Conjunctivae normal.      Pupils: Pupils are equal, round, and reactive to light.   Neck:      Musculoskeletal: Normal range of motion.   Cardiovascular:      Rate and Rhythm: Normal rate and regular rhythm.      Heart sounds: No murmur.   Pulmonary:      Effort: Pulmonary effort is normal.      Breath sounds: Normal breath sounds.   Lymphadenopathy:      Cervical: No cervical adenopathy.         Assessment:       1.  Hyponatremia    2. Sinusitis, unspecified chronicity, unspecified location    3. Essential hypertension    4. Lumbar radiculopathy    5. Insomnia, unspecified type        Plan:       Hyponatremia  -     X-Ray Chest PA And Lateral; Future; Expected date: 09/21/2020  -     TSH; Future    Sinusitis, unspecified chronicity, unspecified location    Essential hypertension    Lumbar radiculopathy    Insomnia, unspecified type    Other orders  -     doxycycline (MONODOX) 100 MG capsule; Take 1 capsule (100 mg total) by mouth 2 (two) times daily.  Dispense: 20 capsule; Refill: 0  -     predniSONE (DELTASONE) 10 MG tablet; Take 4 tabs daily for 2 days then Take 3 tabs daily for 2 days thenTake 2 tabs daily for 2 days then Take 1 tab daily for 2 days then stop  Dispense: 20 tablet; Refill: 0  -     HYDROcodone-acetaminophen (NORCO) 5-325 mg per tablet; Take 1 tablet by mouth 2 (two) times daily as needed for Pain.  Dispense: 60 tablet; Refill: 0  -     HYDROcodone-acetaminophen (NORCO) 5-325 mg per tablet; Take 1 tablet by mouth 2 (two) times daily as needed for Pain.  Dispense: 60 tablet; Refill: 0  -     HYDROcodone-acetaminophen (NORCO) 5-325 mg per tablet; Take 1 tablet by mouth 2 (two) times daily as needed for Pain.  Dispense: 60 tablet; Refill: 0              Plan:  See orders  Start doxy and prednisone taper  rf norco 5  Cont to monitor serum sodium. ? Cause. Patient euvolemic  Cont all other meds      Medication List with Changes/Refills   New Medications    DOXYCYCLINE (MONODOX) 100 MG CAPSULE    Take 1 capsule (100 mg total) by mouth 2 (two) times daily.    HYDROCODONE-ACETAMINOPHEN (NORCO) 5-325 MG PER TABLET    Take 1 tablet by mouth 2 (two) times daily as needed for Pain.    HYDROCODONE-ACETAMINOPHEN (NORCO) 5-325 MG PER TABLET    Take 1 tablet by mouth 2 (two) times daily as needed for Pain.    PREDNISONE (DELTASONE) 10 MG TABLET    Take 4 tabs daily for 2 days then Take 3 tabs daily for 2 days thenTake 2 tabs  daily for 2 days then Take 1 tab daily for 2 days then stop   Current Medications    ALBUTEROL (PROVENTIL/VENTOLIN HFA) 90 MCG/ACTUATION INHALER    INHALE 2 PUFFS BY MOUTH INTO THE LUNGS EVERY 6 HOURS AS NEEDED FOR WHEEZING    ALPRAZOLAM (XANAX) 0.25 MG TABLET    TAKE 1 TABLET(0.25 MG) BY MOUTH TWICE DAILY AS NEEDED FOR ANXIETY    AMLODIPINE (NORVASC) 5 MG TABLET    TAKE 1 TABLET(5 MG) BY MOUTH EVERY DAY    AMOXICILLIN (AMOXIL) 500 MG CAPSULE        ASCORBATE CALCIUM (VITAMIN C ORAL)    Take 1,000 mg by mouth once daily.     ASPIRIN (ECOTRIN) 81 MG EC TABLET    Take 81 mg by mouth once daily.    AZELASTINE (ASTELIN) 137 MCG (0.1 %) NASAL SPRAY    USE 2 SPRAYS IN EACH NOSTRIL EVERY DAY. MAY USE UP TO 2 TIMES DAILY DEPENDING ON SYMPTOMS    B COMPLEX VITAMINS TABLET    Take 1 tablet by mouth once daily.      CALCIUM CITRATE-VITAMIN D3 ORAL        CARVEDILOL (COREG) 12.5 MG TABLET    TAKE 1 TABLET(12.5 MG) BY MOUTH TWICE DAILY WITH MEALS    CO-ENZYME Q-10 30 MG CAPSULE        DEXILANT 60 MG CAPSULE    TAKE 1 CAPSULE(60 MG) BY MOUTH EVERY MORNING BEFORE BREAKFAST    ESCITALOPRAM OXALATE (LEXAPRO) 5 MG TAB    Take 1 tablet (5 mg total) by mouth once daily.    FAMOTIDINE (PEPCID) 20 MG TABLET    TAKE 1 TABLET BY MOUTH TWICE DAILY    FLUTICASONE PROPIONATE (FLONASE) 50 MCG/ACTUATION NASAL SPRAY    SHAKE LIQUID AND USE 2 SPRAYS(100 MCG) IN EACH NOSTRIL EVERY DAY    FLUZONE HIGHDOSE QUAD 20-21  MCG/0.7 ML SYRG    ADM 0.7ML IM UTD    GABAPENTIN (NEURONTIN) 600 MG TABLET    TAKE 1 TABLET(600 MG) BY MOUTH EVERY EVENING    IBANDRONATE (BONIVA) 150 MG TABLET    Take 1 tablet (150 mg total) by mouth every 30 days.    ONDANSETRON (ZOFRAN-ODT) 4 MG TBDL    DIS 1 T UNT Q 4 H PRN N    PRAVASTATIN (PRAVACHOL) 20 MG TABLET    TAKE 1 TABLET(20 MG) BY MOUTH EVERY NIGHT    SHINGRIX, PF, 50 MCG/0.5 ML INJECTION        VITAMIN B-12 1000 MCG TABLET    Take 1,000 mcg by mouth once daily.     ZOLPIDEM (AMBIEN) 10 MG TAB    TAKE 1 TABLET(10  MG) BY MOUTH EVERY NIGHT   Changed and/or Refilled Medications    Modified Medication Previous Medication    HYDROCODONE-ACETAMINOPHEN (NORCO) 5-325 MG PER TABLET HYDROcodone-acetaminophen (NORCO) 5-325 mg per tablet       Take 1 tablet by mouth 2 (two) times daily as needed for Pain.    Take 1 tablet by mouth 2 (two) times daily as needed for Pain.   Discontinued Medications    DOXYCYCLINE (MONODOX) 100 MG CAPSULE    Take 1 capsule (100 mg total) by mouth 2 (two) times daily.    PREDNISONE (DELTASONE) 10 MG TABLET    Take 4 tabs daily for 2 days then Take 3 tabs daily for 2 days thenTake 2 tabs daily for 2 days then Take 1 tab daily for 2 days then stop

## 2020-09-21 NOTE — PROGRESS NOTES
Subjective:       Patient ID:  Luis Miguel Murcia is a 75 y.o. female who presents for   Chief Complaint   Patient presents with    Rash     ears to neck &  chest      76 y/o F presents for evaluation of a rash on her chest. Last OV 1-16-20   The rash is spreading. At last OV, no rash present     She stopped topical steroids bc of bruising     Now using DermMed to her chest (contains retinol) with no improvement and is applying a Neutrogena with retinol         Review of Systems   Skin: Positive for rash and activity-related sunscreen use. Negative for itching (not currently) and daily sunscreen use.   Hematologic/Lymphatic: Bruises/bleeds easily.        Objective:    Physical Exam   Constitutional: She appears well-developed and well-nourished.   Neurological: She is alert and oriented to person, place, and time.   Psychiatric: She has a normal mood and affect.   Skin:   Areas Examined (abnormalities noted in diagram):   Scalp / Hair Palpated and Inspected  Head / Face Inspection Performed  Neck Inspection Performed  Chest / Axilla Inspection Performed                Diagram Legend     Erythematous scaling macule/papule c/w actinic keratosis       Vascular papule c/w angioma      Pigmented verrucoid papule/plaque c/w seborrheic keratosis      Yellow umbilicated papule c/w sebaceous hyperplasia      Irregularly shaped tan macule c/w lentigo     1-2 mm smooth white papules consistent with Milia      Movable subcutaneous cyst with punctum c/w epidermal inclusion cyst      Subcutaneous movable cyst c/w pilar cyst      Firm pink to brown papule c/w dermatofibroma      Pedunculated fleshy papule(s) c/w skin tag(s)      Evenly pigmented macule c/w junctional nevus     Mildly variegated pigmented, slightly irregular-bordered macule c/w mildly atypical nevus      Flesh colored to evenly pigmented papule c/w intradermal nevus       Pink pearly papule/plaque c/w basal cell carcinoma      Erythematous hyperkeratotic  cursted plaque c/w SCC      Surgical scar with no sign of skin cancer recurrence      Open and closed comedones      Inflammatory papules and pustules      Verrucoid papule consistent consistent with wart     Erythematous eczematous patches and plaques     Dystrophic onycholytic nail with subungual debris c/w onychomycosis     Umbilicated papule    Erythematous-base heme-crusted tan verrucoid plaque consistent with inflamed seborrheic keratosis     Erythematous Silvery Scaling Plaque c/w Psoriasis     See annotation      Assessment / Plan:      Pathology Orders:     Normal Orders This Visit    Specimen to Pathology, Dermatology     Comments:    Number of Specimens:->1  ------------------------->-------------------------  Spec 1 Procedure:->Biopsy  Spec 1 Clinical Impression:->erythematous pruritic patch on  chest; suspect contact dermatitis  Spec 1 Source:->R chest    Questions:    Procedure Type: Dermatology and skin neoplasms    Number of Specimens: 1    ------------------------: -------------------------    Spec 1 Procedure: Biopsy    Spec 1 Clinical Impression: erythematous pruritic patch on chest; suspect contact dermatitis    Spec 1 Source: R chest        Disorder of skin and subcutaneous tissue  -     Specimen to Pathology, Dermatology    Punch biopsy procedure note:  Punch biopsy performed after verbal consent obtained. Area marked and prepped with alcohol. Approximately 1cc of 1% lidocaine with epinephrine injected. 3 mm disposable punch used to remove lesion. Hemostasis obtained and biopsy site closed with 1 Prolene suture. Wound care instructions reviewed with patient and handout given.    Vanicream + cold compresses prn  Pt would like to avoid topical steroids due to atrophy and bruising         Follow up in about 2 weeks (around 10/5/2020).

## 2020-09-24 LAB
FINAL PATHOLOGIC DIAGNOSIS: NORMAL
GROSS: NORMAL

## 2020-09-29 ENCOUNTER — HOSPITAL ENCOUNTER (OUTPATIENT)
Dept: RADIOLOGY | Facility: HOSPITAL | Age: 75
Discharge: HOME | End: 2020-09-29
Attending: FAMILY MEDICINE
Payer: MEDICARE

## 2020-09-29 DIAGNOSIS — E87.1 HYPONATREMIA: ICD-10-CM

## 2020-09-29 PROCEDURE — 71046 XR CHEST PA AND LATERAL: ICD-10-PCS | Mod: 26,,, | Performed by: RADIOLOGY

## 2020-09-29 PROCEDURE — 71046 X-RAY EXAM CHEST 2 VIEWS: CPT | Mod: 26,,, | Performed by: RADIOLOGY

## 2020-09-29 PROCEDURE — 71046 X-RAY EXAM CHEST 2 VIEWS: CPT | Mod: TC,FY,PO

## 2020-10-01 ENCOUNTER — TELEPHONE (OUTPATIENT)
Dept: DERMATOLOGY | Facility: CLINIC | Age: 75
End: 2020-10-01

## 2020-10-01 NOTE — TELEPHONE ENCOUNTER
Pt contacted & stated doing much  . Applying w/ Curel & doing better. Wash clothes with free & clear . Bathes w/ Dove .            biopsy was most consistent with a contact dermatitis   Please ask how her chest is doing   Thanks   RAUL          ----- Message from Princess CODI Braga sent at 10/1/2020 11:49 AM CDT -----  Contact: pt  Type:  Patient Returning Call    Who Called:  pt  Who Left Message for Patient:  Nurse   Does the patient know what this is regarding?:  yes  Best Call Back Number:  [phone] (home)     Additional Information:

## 2020-10-05 ENCOUNTER — OFFICE VISIT (OUTPATIENT)
Dept: DERMATOLOGY | Facility: CLINIC | Age: 75
End: 2020-10-05
Payer: MEDICARE

## 2020-10-05 VITALS — BODY MASS INDEX: 21.9 KG/M2 | WEIGHT: 119 LBS | HEIGHT: 62 IN

## 2020-10-05 DIAGNOSIS — L25.9 CONTACT DERMATITIS, UNSPECIFIED CONTACT DERMATITIS TYPE, UNSPECIFIED TRIGGER: ICD-10-CM

## 2020-10-05 DIAGNOSIS — L82.1 SEBORRHEIC KERATOSES: Primary | ICD-10-CM

## 2020-10-05 PROCEDURE — 99999 PR PBB SHADOW E&M-EST. PATIENT-LVL III: CPT | Mod: PBBFAC,HCNC,, | Performed by: DERMATOLOGY

## 2020-10-05 PROCEDURE — 1159F MED LIST DOCD IN RCRD: CPT | Mod: HCNC,S$GLB,, | Performed by: DERMATOLOGY

## 2020-10-05 PROCEDURE — 1126F PR PAIN SEVERITY QUANTIFIED, NO PAIN PRESENT: ICD-10-PCS | Mod: HCNC,S$GLB,, | Performed by: DERMATOLOGY

## 2020-10-05 PROCEDURE — 1126F AMNT PAIN NOTED NONE PRSNT: CPT | Mod: HCNC,S$GLB,, | Performed by: DERMATOLOGY

## 2020-10-05 PROCEDURE — 1101F PT FALLS ASSESS-DOCD LE1/YR: CPT | Mod: HCNC,CPTII,S$GLB, | Performed by: DERMATOLOGY

## 2020-10-05 PROCEDURE — 1101F PR PT FALLS ASSESS DOC 0-1 FALLS W/OUT INJ PAST YR: ICD-10-PCS | Mod: HCNC,CPTII,S$GLB, | Performed by: DERMATOLOGY

## 2020-10-05 PROCEDURE — 99213 PR OFFICE/OUTPT VISIT, EST, LEVL III, 20-29 MIN: ICD-10-PCS | Mod: HCNC,S$GLB,, | Performed by: DERMATOLOGY

## 2020-10-05 PROCEDURE — 99999 PR PBB SHADOW E&M-EST. PATIENT-LVL III: ICD-10-PCS | Mod: PBBFAC,HCNC,, | Performed by: DERMATOLOGY

## 2020-10-05 PROCEDURE — 1159F PR MEDICATION LIST DOCUMENTED IN MEDICAL RECORD: ICD-10-PCS | Mod: HCNC,S$GLB,, | Performed by: DERMATOLOGY

## 2020-10-05 PROCEDURE — 99213 OFFICE O/P EST LOW 20 MIN: CPT | Mod: HCNC,S$GLB,, | Performed by: DERMATOLOGY
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
## 2020-10-12 ENCOUNTER — TELEPHONE (OUTPATIENT)
Dept: FAMILY MEDICINE | Facility: CLINIC | Age: 75
End: 2020-10-12

## 2020-10-12 NOTE — TELEPHONE ENCOUNTER
loim for pt regarding results, left a good call back number for her to call if she had any questions .

## 2020-10-26 DIAGNOSIS — J32.9 SINUSITIS, UNSPECIFIED CHRONICITY, UNSPECIFIED LOCATION: Primary | ICD-10-CM

## 2020-10-26 NOTE — TELEPHONE ENCOUNTER
No new care gaps identified.  Powered by [employer]. Reference number: [id number]. 10/26/2020 6:30:52 PM   CDT

## 2020-10-27 RX ORDER — AZELASTINE 1 MG/ML
SPRAY, METERED NASAL
Qty: 90 ML | Refills: 3 | Status: SHIPPED | OUTPATIENT
Start: 2020-10-27 | End: 2022-01-28 | Stop reason: SDUPTHER

## 2020-10-28 RX ORDER — FLUTICASONE PROPIONATE 50 MCG
SPRAY, SUSPENSION (ML) NASAL
Qty: 48 G | Refills: 3 | Status: SHIPPED | OUTPATIENT
Start: 2020-10-28 | End: 2022-01-28 | Stop reason: SDUPTHER

## 2020-10-28 RX ORDER — GABAPENTIN 600 MG/1
TABLET ORAL
Qty: 30 TABLET | Refills: 2 | Status: SHIPPED | OUTPATIENT
Start: 2020-10-28 | End: 2021-01-04 | Stop reason: SDUPTHER

## 2020-10-28 RX ORDER — IBANDRONATE SODIUM 150 MG/1
TABLET, FILM COATED ORAL
Qty: 1 TABLET | Refills: 11 | Status: SHIPPED | OUTPATIENT
Start: 2020-10-28 | End: 2022-01-20

## 2020-10-28 NOTE — PROGRESS NOTES
Refill Routing Note   Medication(s) are not appropriate for processing by [facility] for the following reason(s):     - Outside of protocol  - Allergy/Contraindication: fluticasone propionate/Triamcinolone    [facility] actions taken in this encounter:   Defer  Route    Medication-related problems identified: Adverse drug effects  Medication Therapy Plan: [facility]  Medication reconciliation completed: No   Automatic Epic Generated Protocol Data:    Orders Placed This Encounter    azelastine (ASTELIN) 137 mcg (0.1 %) nasal spray      Requested Prescriptions   Pending Prescriptions Disp Refills    fluticasone propionate (FLONASE) 50 mcg/actuation nasal spray [Pharmacy Med Name: FLUTICASONE 50MCG NASAL SP (120) RX] 48 g 3     Sig: SHAKE LIQUID AND USE 2 SPRAYS(100 MCG) IN EACH NOSTRIL EVERY DAY       Ear, Nose, and Throat: Nasal Preparations - Corticosteroids Passed - 10/26/2020  6:31 PM        Passed - Patient is at least 18 years old        Passed - Office visit in past 12 months or future 90 days     Recent Outpatient Visits            3 weeks ago Seborrheic keratoses    [facility] Sudha Marie MD    1 month ago Disorder of skin and subcutaneous tissue    [facility] Sudha Marie MD    1 month ago Hyponatremia    [facility] Juan Francisco Jeong MD    4 months ago Essential hypertension    [facility] Juan Francisco Jeong MD    8 months ago Essential hypertension    [facility] Juan Francisco Jeong MD          Future Appointments              In 1 month Juan Francisco Jeong MD [facility]                  gabapentin (NEURONTIN) 600 MG tablet [Pharmacy Med Name: GABAPENTIN 600MG TABLETS] 30 tablet 2     Sig: TAKE 1 TABLET(600 MG) BY MOUTH EVERY EVENING       Anticonvulsants Protocol Passed - 10/26/2020  6:31 PM        Passed - Visit with Authorizing provider in past 9 months or upcoming 90 days        Passed - No  active pregnancy on record          ibandronate (BONIVA) 150 mg tablet [Pharmacy Med Name: IBANDRONATE SODIUM 150MG TABLETS] 1 tablet 11     Sig: TAKE 1 TABLET(150 MG) BY MOUTH EVERY 30 DAYS       There is no refill protocol information for this order      Signed Prescriptions Disp Refills    azelastine (ASTELIN) 137 mcg (0.1 %) nasal spray 90 mL 3     Sig: USE 2 SPRAYS IN EACH NOSTRIL EVERY DAY. MAY USE UP TO 2 TIMES DAILY DEPENDING ON SYMPTOMS       Ear, Nose, and Throat: Nasal Preparations - Antiallergy Passed - 10/26/2020  6:31 PM        Passed - Patient is at least 18 years old        Passed - Office visit in past 12 months or future 90 days     Recent Outpatient Visits            3 weeks ago Seborrheic keratoses    [facility] Dermatology Sudha Marie MD    1 month ago Disorder of skin and subcutaneous tissue    [facility] Sudha Marie MD    1 month ago Hyponatremia    [facility] Juan Francisco Jeong MD    4 months ago Essential hypertension    [facility] Juan Francisco Jeong MD    8 months ago Essential hypertension    [facility] Juan Francisco Jeong MD          Future Appointments              In 1 month Juan Francisco Jeong MD [facility] [place]                      Appointments  past 12m or future 3m with PCP    Date Provider   Last Visit   9/21/2020 Juan Francisco Jeong MD   Next Visit   12/21/2020 Juan Francisco Jeong MD   ED visits in past 90 days: 0        Note composed:10:08 PM 10/27/2020

## 2020-12-22 DIAGNOSIS — F41.9 ANXIETY: ICD-10-CM

## 2020-12-22 NOTE — TELEPHONE ENCOUNTER
----- Message from Diane Connor sent at 12/22/2020  2:35 PM CST -----  Regarding: Refill  Contact: patient  Type:  RX Refill Request    Who Called:  Patient   Refill or New Rx:  Refill  RX Name and Strength:  XANAX 0.25 MG     How is the patient currently taking it? (ex. 1XDay):  twice a day  Is this a 30 day or 90 day RX:  90day  Preferred Pharmacy with phone number:    [facility] #[id number] - [place] [address] & [employer] [address] [place] [id number]  Phone: [phone] Fax: [phone]     Local or Mail Order:  Local  Ordering Provider:  Dr. Jean-Paul Bowie Call Back Number:  [phone] (home)     Case number [id number]

## 2020-12-23 RX ORDER — ALPRAZOLAM 0.25 MG/1
TABLET ORAL
Qty: 60 TABLET | Refills: 2 | Status: SHIPPED | OUTPATIENT
Start: 2020-12-23 | End: 2020-12-28 | Stop reason: SDUPTHER

## 2020-12-28 DIAGNOSIS — F41.9 ANXIETY: ICD-10-CM

## 2020-12-28 RX ORDER — ALPRAZOLAM 0.25 MG/1
TABLET ORAL
Qty: 60 TABLET | Refills: 2 | Status: SHIPPED | OUTPATIENT
Start: 2020-12-28 | End: 2021-07-09

## 2020-12-28 NOTE — TELEPHONE ENCOUNTER
Received via fax:    Request refill for :    ALPRAZOLAM 0.25MG TABLETS (TAKE 1 TABLET ((0.25MG)) BY MOUTH TWICE DAILY AS NEEDED FOR ANXIETY)    PHARMACY:    [place] [address] [facility]

## 2021-01-04 ENCOUNTER — OFFICE VISIT (OUTPATIENT)
Dept: FAMILY MEDICINE | Facility: CLINIC | Age: 76
End: 2021-01-04
Payer: MEDICARE

## 2021-01-04 VITALS
HEIGHT: 62 IN | OXYGEN SATURATION: 95 % | WEIGHT: 122.81 LBS | BODY MASS INDEX: 22.6 KG/M2 | DIASTOLIC BLOOD PRESSURE: 76 MMHG | HEART RATE: 63 BPM | SYSTOLIC BLOOD PRESSURE: 118 MMHG

## 2021-01-04 DIAGNOSIS — M47.812 CERVICAL SPONDYLOSIS WITHOUT MYELOPATHY: ICD-10-CM

## 2021-01-04 DIAGNOSIS — F41.9 ANXIETY: ICD-10-CM

## 2021-01-04 DIAGNOSIS — M54.16 LUMBAR RADICULOPATHY: ICD-10-CM

## 2021-01-04 DIAGNOSIS — I10 ESSENTIAL HYPERTENSION: Primary | ICD-10-CM

## 2021-01-04 PROCEDURE — 99214 OFFICE O/P EST MOD 30 MIN: CPT | Mod: HCNC,S$GLB,, | Performed by: FAMILY MEDICINE

## 2021-01-04 PROCEDURE — 3288F PR FALLS RISK ASSESSMENT DOCUMENTED: ICD-10-PCS | Mod: HCNC,CPTII,S$GLB, | Performed by: FAMILY MEDICINE

## 2021-01-04 PROCEDURE — 3074F PR MOST RECENT SYSTOLIC BLOOD PRESSURE < 130 MM HG: ICD-10-PCS | Mod: HCNC,CPTII,S$GLB, | Performed by: FAMILY MEDICINE

## 2021-01-04 PROCEDURE — 3288F FALL RISK ASSESSMENT DOCD: CPT | Mod: HCNC,CPTII,S$GLB, | Performed by: FAMILY MEDICINE

## 2021-01-04 PROCEDURE — 99999 PR PBB SHADOW E&M-EST. PATIENT-LVL V: ICD-10-PCS | Mod: PBBFAC,HCNC,, | Performed by: FAMILY MEDICINE

## 2021-01-04 PROCEDURE — 1101F PR PT FALLS ASSESS DOC 0-1 FALLS W/OUT INJ PAST YR: ICD-10-PCS | Mod: HCNC,CPTII,S$GLB, | Performed by: FAMILY MEDICINE

## 2021-01-04 PROCEDURE — 1125F AMNT PAIN NOTED PAIN PRSNT: CPT | Mod: HCNC,S$GLB,, | Performed by: FAMILY MEDICINE

## 2021-01-04 PROCEDURE — 99499 UNLISTED E&M SERVICE: CPT | Mod: S$GLB,,, | Performed by: FAMILY MEDICINE

## 2021-01-04 PROCEDURE — 1125F PR PAIN SEVERITY QUANTIFIED, PAIN PRESENT: ICD-10-PCS | Mod: HCNC,S$GLB,, | Performed by: FAMILY MEDICINE

## 2021-01-04 PROCEDURE — 3078F PR MOST RECENT DIASTOLIC BLOOD PRESSURE < 80 MM HG: ICD-10-PCS | Mod: HCNC,CPTII,S$GLB, | Performed by: FAMILY MEDICINE

## 2021-01-04 PROCEDURE — 99499 RISK ADDL DX/OHS AUDIT: ICD-10-PCS | Mod: S$GLB,,, | Performed by: FAMILY MEDICINE

## 2021-01-04 PROCEDURE — 1159F PR MEDICATION LIST DOCUMENTED IN MEDICAL RECORD: ICD-10-PCS | Mod: HCNC,S$GLB,, | Performed by: FAMILY MEDICINE

## 2021-01-04 PROCEDURE — 1101F PT FALLS ASSESS-DOCD LE1/YR: CPT | Mod: HCNC,CPTII,S$GLB, | Performed by: FAMILY MEDICINE

## 2021-01-04 PROCEDURE — 3074F SYST BP LT 130 MM HG: CPT | Mod: HCNC,CPTII,S$GLB, | Performed by: FAMILY MEDICINE

## 2021-01-04 PROCEDURE — 99999 PR PBB SHADOW E&M-EST. PATIENT-LVL V: CPT | Mod: PBBFAC,HCNC,, | Performed by: FAMILY MEDICINE

## 2021-01-04 PROCEDURE — 3078F DIAST BP <80 MM HG: CPT | Mod: HCNC,CPTII,S$GLB, | Performed by: FAMILY MEDICINE

## 2021-01-04 PROCEDURE — 99214 PR OFFICE/OUTPT VISIT, EST, LEVL IV, 30-39 MIN: ICD-10-PCS | Mod: HCNC,S$GLB,, | Performed by: FAMILY MEDICINE

## 2021-01-04 PROCEDURE — 1159F MED LIST DOCD IN RCRD: CPT | Mod: HCNC,S$GLB,, | Performed by: FAMILY MEDICINE

## 2021-01-04 RX ORDER — HYDROCODONE BITARTRATE AND ACETAMINOPHEN 5; 325 MG/1; MG/1
1 TABLET ORAL 2 TIMES DAILY PRN
Qty: 60 TABLET | Refills: 0 | Status: SHIPPED | OUTPATIENT
Start: 2021-01-04 | End: 2021-02-03

## 2021-01-04 RX ORDER — HYDROCODONE BITARTRATE AND ACETAMINOPHEN 5; 325 MG/1; MG/1
1 TABLET ORAL 2 TIMES DAILY PRN
Qty: 60 TABLET | Refills: 0 | Status: SHIPPED | OUTPATIENT
Start: 2021-02-03 | End: 2021-03-05

## 2021-01-04 RX ORDER — GABAPENTIN 600 MG/1
TABLET ORAL
Qty: 30 TABLET | Refills: 11 | Status: SHIPPED | OUTPATIENT
Start: 2021-01-04 | End: 2022-01-07

## 2021-01-04 RX ORDER — HYDROCODONE BITARTRATE AND ACETAMINOPHEN 5; 325 MG/1; MG/1
1 TABLET ORAL 2 TIMES DAILY PRN
Qty: 60 TABLET | Refills: 0 | Status: SHIPPED | OUTPATIENT
Start: 2021-03-05 | End: 2021-04-04

## 2021-02-05 ENCOUNTER — IMMUNIZATION (OUTPATIENT)
Dept: FAMILY MEDICINE | Facility: CLINIC | Age: 76
End: 2021-02-05
Payer: MEDICARE

## 2021-02-05 DIAGNOSIS — Z23 NEED FOR VACCINATION: Primary | ICD-10-CM

## 2021-02-05 PROCEDURE — 91300 COVID-19, MRNA, LNP-S, PF, 30 MCG/0.3 ML DOSE VACCINE: CPT | Mod: PBBFAC | Performed by: FAMILY MEDICINE

## 2021-02-12 RX ORDER — ZOLPIDEM TARTRATE 10 MG/1
10 TABLET ORAL NIGHTLY
Qty: 30 TABLET | Refills: 5 | Status: SHIPPED | OUTPATIENT
Start: 2021-02-12 | End: 2021-08-10

## 2021-02-26 ENCOUNTER — IMMUNIZATION (OUTPATIENT)
Dept: FAMILY MEDICINE | Facility: CLINIC | Age: 76
End: 2021-02-26
Payer: MEDICARE

## 2021-02-26 DIAGNOSIS — Z23 NEED FOR VACCINATION: Primary | ICD-10-CM

## 2021-02-26 PROCEDURE — 91300 COVID-19, MRNA, LNP-S, PF, 30 MCG/0.3 ML DOSE VACCINE: CPT | Mod: PBBFAC | Performed by: FAMILY MEDICINE

## 2021-02-26 PROCEDURE — 0002A COVID-19, MRNA, LNP-S, PF, 30 MCG/0.3 ML DOSE VACCINE: CPT | Mod: PBBFAC | Performed by: FAMILY MEDICINE

## 2021-04-20 ENCOUNTER — TELEPHONE (OUTPATIENT)
Dept: FAMILY MEDICINE | Facility: CLINIC | Age: 76
End: 2021-04-20

## 2021-04-21 ENCOUNTER — TELEPHONE (OUTPATIENT)
Dept: FAMILY MEDICINE | Facility: CLINIC | Age: 76
End: 2021-04-21

## 2021-04-26 ENCOUNTER — OFFICE VISIT (OUTPATIENT)
Dept: FAMILY MEDICINE | Facility: CLINIC | Age: 76
End: 2021-04-26
Payer: MEDICARE

## 2021-04-26 VITALS
BODY MASS INDEX: 22.28 KG/M2 | SYSTOLIC BLOOD PRESSURE: 114 MMHG | WEIGHT: 121.06 LBS | DIASTOLIC BLOOD PRESSURE: 72 MMHG | HEART RATE: 54 BPM | TEMPERATURE: 98 F | HEIGHT: 62 IN | OXYGEN SATURATION: 98 %

## 2021-04-26 DIAGNOSIS — I10 ESSENTIAL HYPERTENSION: ICD-10-CM

## 2021-04-26 DIAGNOSIS — M50.30 DDD (DEGENERATIVE DISC DISEASE), CERVICAL: Primary | ICD-10-CM

## 2021-04-26 DIAGNOSIS — M51.36 DDD (DEGENERATIVE DISC DISEASE), LUMBAR: ICD-10-CM

## 2021-04-26 PROCEDURE — 99499 RISK ADDL DX/OHS AUDIT: ICD-10-PCS | Mod: S$GLB,,, | Performed by: INTERNAL MEDICINE

## 2021-04-26 PROCEDURE — 1159F PR MEDICATION LIST DOCUMENTED IN MEDICAL RECORD: ICD-10-PCS | Mod: S$GLB,,, | Performed by: INTERNAL MEDICINE

## 2021-04-26 PROCEDURE — 99214 PR OFFICE/OUTPT VISIT, EST, LEVL IV, 30-39 MIN: ICD-10-PCS | Mod: S$GLB,,, | Performed by: INTERNAL MEDICINE

## 2021-04-26 PROCEDURE — 3288F PR FALLS RISK ASSESSMENT DOCUMENTED: ICD-10-PCS | Mod: CPTII,S$GLB,, | Performed by: INTERNAL MEDICINE

## 2021-04-26 PROCEDURE — 1125F PR PAIN SEVERITY QUANTIFIED, PAIN PRESENT: ICD-10-PCS | Mod: S$GLB,,, | Performed by: INTERNAL MEDICINE

## 2021-04-26 PROCEDURE — 3074F SYST BP LT 130 MM HG: CPT | Mod: CPTII,S$GLB,, | Performed by: INTERNAL MEDICINE

## 2021-04-26 PROCEDURE — 1125F AMNT PAIN NOTED PAIN PRSNT: CPT | Mod: S$GLB,,, | Performed by: INTERNAL MEDICINE

## 2021-04-26 PROCEDURE — 3078F PR MOST RECENT DIASTOLIC BLOOD PRESSURE < 80 MM HG: ICD-10-PCS | Mod: CPTII,S$GLB,, | Performed by: INTERNAL MEDICINE

## 2021-04-26 PROCEDURE — 3288F FALL RISK ASSESSMENT DOCD: CPT | Mod: CPTII,S$GLB,, | Performed by: INTERNAL MEDICINE

## 2021-04-26 PROCEDURE — 99499 UNLISTED E&M SERVICE: CPT | Mod: S$GLB,,, | Performed by: INTERNAL MEDICINE

## 2021-04-26 PROCEDURE — 99999 PR PBB SHADOW E&M-EST. PATIENT-LVL IV: ICD-10-PCS | Mod: PBBFAC,,, | Performed by: INTERNAL MEDICINE

## 2021-04-26 PROCEDURE — 3078F DIAST BP <80 MM HG: CPT | Mod: CPTII,S$GLB,, | Performed by: INTERNAL MEDICINE

## 2021-04-26 PROCEDURE — 99214 OFFICE O/P EST MOD 30 MIN: CPT | Mod: S$GLB,,, | Performed by: INTERNAL MEDICINE

## 2021-04-26 PROCEDURE — 3074F PR MOST RECENT SYSTOLIC BLOOD PRESSURE < 130 MM HG: ICD-10-PCS | Mod: CPTII,S$GLB,, | Performed by: INTERNAL MEDICINE

## 2021-04-26 PROCEDURE — 1101F PR PT FALLS ASSESS DOC 0-1 FALLS W/OUT INJ PAST YR: ICD-10-PCS | Mod: CPTII,S$GLB,, | Performed by: INTERNAL MEDICINE

## 2021-04-26 PROCEDURE — 1159F MED LIST DOCD IN RCRD: CPT | Mod: S$GLB,,, | Performed by: INTERNAL MEDICINE

## 2021-04-26 PROCEDURE — 99999 PR PBB SHADOW E&M-EST. PATIENT-LVL IV: CPT | Mod: PBBFAC,,, | Performed by: INTERNAL MEDICINE

## 2021-04-26 PROCEDURE — 1101F PT FALLS ASSESS-DOCD LE1/YR: CPT | Mod: CPTII,S$GLB,, | Performed by: INTERNAL MEDICINE

## 2021-04-26 RX ORDER — CARVEDILOL 12.5 MG/1
TABLET ORAL
Qty: 180 TABLET | Refills: 3 | Status: SHIPPED | OUTPATIENT
Start: 2021-04-26 | End: 2022-04-12

## 2021-04-26 RX ORDER — AMLODIPINE BESYLATE 5 MG/1
5 TABLET ORAL DAILY
Qty: 90 TABLET | Refills: 0 | Status: SHIPPED | OUTPATIENT
Start: 2021-04-26 | End: 2021-07-27

## 2021-04-26 RX ORDER — HYDROCODONE BITARTRATE AND ACETAMINOPHEN 5; 325 MG/1; MG/1
1 TABLET ORAL EVERY 12 HOURS PRN
Qty: 60 TABLET | Refills: 0 | Status: SHIPPED | OUTPATIENT
Start: 2021-04-26 | End: 2021-11-01

## 2021-04-26 RX ORDER — HYDROCODONE BITARTRATE AND ACETAMINOPHEN 5; 325 MG/1; MG/1
1 TABLET ORAL EVERY 12 HOURS PRN
Qty: 60 TABLET | Refills: 0 | Status: SHIPPED | OUTPATIENT
Start: 2021-04-26 | End: 2021-07-30 | Stop reason: SDUPTHER

## 2021-04-26 RX ORDER — HYDROCODONE BITARTRATE AND ACETAMINOPHEN 5; 325 MG/1; MG/1
1 TABLET ORAL EVERY 12 HOURS PRN
Qty: 60 TABLET | Refills: 0 | Status: SHIPPED | OUTPATIENT
Start: 2021-04-26 | End: 2021-11-01 | Stop reason: SDUPTHER

## 2021-06-07 DIAGNOSIS — K21.9 LPRD (LARYNGOPHARYNGEAL REFLUX DISEASE): ICD-10-CM

## 2021-06-07 RX ORDER — DEXLANSOPRAZOLE 60 MG/1
CAPSULE, DELAYED RELEASE ORAL
Qty: 90 CAPSULE | Refills: 3 | Status: SHIPPED | OUTPATIENT
Start: 2021-06-07 | End: 2022-06-16

## 2021-07-07 DIAGNOSIS — F41.9 ANXIETY: ICD-10-CM

## 2021-07-09 RX ORDER — ALPRAZOLAM 0.25 MG/1
TABLET ORAL
Qty: 60 TABLET | Refills: 2 | Status: SHIPPED | OUTPATIENT
Start: 2021-07-09 | End: 2021-10-20

## 2021-07-16 ENCOUNTER — TELEPHONE (OUTPATIENT)
Dept: NEUROSURGERY | Facility: CLINIC | Age: 76
End: 2021-07-16

## 2021-07-27 DIAGNOSIS — I10 ESSENTIAL HYPERTENSION: ICD-10-CM

## 2021-07-29 RX ORDER — AMLODIPINE BESYLATE 5 MG/1
TABLET ORAL
Qty: 90 TABLET | OUTPATIENT
Start: 2021-07-29

## 2021-07-30 ENCOUNTER — OFFICE VISIT (OUTPATIENT)
Dept: FAMILY MEDICINE | Facility: CLINIC | Age: 76
End: 2021-07-30
Payer: MEDICARE

## 2021-07-30 VITALS
WEIGHT: 121.5 LBS | BODY MASS INDEX: 22.36 KG/M2 | SYSTOLIC BLOOD PRESSURE: 112 MMHG | DIASTOLIC BLOOD PRESSURE: 60 MMHG | OXYGEN SATURATION: 99 % | HEIGHT: 62 IN | HEART RATE: 51 BPM | TEMPERATURE: 98 F

## 2021-07-30 DIAGNOSIS — M50.30 DDD (DEGENERATIVE DISC DISEASE), CERVICAL: ICD-10-CM

## 2021-07-30 DIAGNOSIS — M51.36 DDD (DEGENERATIVE DISC DISEASE), LUMBAR: ICD-10-CM

## 2021-07-30 PROCEDURE — 99213 PR OFFICE/OUTPT VISIT, EST, LEVL III, 20-29 MIN: ICD-10-PCS | Mod: S$GLB,,, | Performed by: INTERNAL MEDICINE

## 2021-07-30 PROCEDURE — 1101F PT FALLS ASSESS-DOCD LE1/YR: CPT | Mod: CPTII,S$GLB,, | Performed by: INTERNAL MEDICINE

## 2021-07-30 PROCEDURE — 1160F RVW MEDS BY RX/DR IN RCRD: CPT | Mod: CPTII,S$GLB,, | Performed by: INTERNAL MEDICINE

## 2021-07-30 PROCEDURE — 3288F PR FALLS RISK ASSESSMENT DOCUMENTED: ICD-10-PCS | Mod: CPTII,S$GLB,, | Performed by: INTERNAL MEDICINE

## 2021-07-30 PROCEDURE — 99213 OFFICE O/P EST LOW 20 MIN: CPT | Mod: S$GLB,,, | Performed by: INTERNAL MEDICINE

## 2021-07-30 PROCEDURE — 1160F PR REVIEW ALL MEDS BY PRESCRIBER/CLIN PHARMACIST DOCUMENTED: ICD-10-PCS | Mod: CPTII,S$GLB,, | Performed by: INTERNAL MEDICINE

## 2021-07-30 PROCEDURE — 3078F PR MOST RECENT DIASTOLIC BLOOD PRESSURE < 80 MM HG: ICD-10-PCS | Mod: CPTII,S$GLB,, | Performed by: INTERNAL MEDICINE

## 2021-07-30 PROCEDURE — 99999 PR PBB SHADOW E&M-EST. PATIENT-LVL V: ICD-10-PCS | Mod: PBBFAC,,, | Performed by: INTERNAL MEDICINE

## 2021-07-30 PROCEDURE — 3074F SYST BP LT 130 MM HG: CPT | Mod: CPTII,S$GLB,, | Performed by: INTERNAL MEDICINE

## 2021-07-30 PROCEDURE — 1126F PR PAIN SEVERITY QUANTIFIED, NO PAIN PRESENT: ICD-10-PCS | Mod: CPTII,S$GLB,, | Performed by: INTERNAL MEDICINE

## 2021-07-30 PROCEDURE — 1101F PR PT FALLS ASSESS DOC 0-1 FALLS W/OUT INJ PAST YR: ICD-10-PCS | Mod: CPTII,S$GLB,, | Performed by: INTERNAL MEDICINE

## 2021-07-30 PROCEDURE — 1126F AMNT PAIN NOTED NONE PRSNT: CPT | Mod: CPTII,S$GLB,, | Performed by: INTERNAL MEDICINE

## 2021-07-30 PROCEDURE — 3078F DIAST BP <80 MM HG: CPT | Mod: CPTII,S$GLB,, | Performed by: INTERNAL MEDICINE

## 2021-07-30 PROCEDURE — 1159F PR MEDICATION LIST DOCUMENTED IN MEDICAL RECORD: ICD-10-PCS | Mod: CPTII,S$GLB,, | Performed by: INTERNAL MEDICINE

## 2021-07-30 PROCEDURE — 99999 PR PBB SHADOW E&M-EST. PATIENT-LVL V: CPT | Mod: PBBFAC,,, | Performed by: INTERNAL MEDICINE

## 2021-07-30 PROCEDURE — 3074F PR MOST RECENT SYSTOLIC BLOOD PRESSURE < 130 MM HG: ICD-10-PCS | Mod: CPTII,S$GLB,, | Performed by: INTERNAL MEDICINE

## 2021-07-30 PROCEDURE — 3288F FALL RISK ASSESSMENT DOCD: CPT | Mod: CPTII,S$GLB,, | Performed by: INTERNAL MEDICINE

## 2021-07-30 PROCEDURE — 1159F MED LIST DOCD IN RCRD: CPT | Mod: CPTII,S$GLB,, | Performed by: INTERNAL MEDICINE

## 2021-07-30 RX ORDER — HYDROCODONE BITARTRATE AND ACETAMINOPHEN 5; 325 MG/1; MG/1
1 TABLET ORAL EVERY 12 HOURS PRN
Qty: 60 TABLET | Refills: 0 | Status: SHIPPED | OUTPATIENT
Start: 2021-09-25 | End: 2021-10-25

## 2021-07-30 RX ORDER — HYDROCODONE BITARTRATE AND ACETAMINOPHEN 5; 325 MG/1; MG/1
1 TABLET ORAL EVERY 12 HOURS PRN
Qty: 60 TABLET | Refills: 0 | Status: SHIPPED | OUTPATIENT
Start: 2021-08-27 | End: 2021-09-26

## 2021-07-30 RX ORDER — HYDROCODONE BITARTRATE AND ACETAMINOPHEN 5; 325 MG/1; MG/1
1 TABLET ORAL EVERY 12 HOURS PRN
Qty: 60 TABLET | Refills: 0 | Status: SHIPPED | OUTPATIENT
Start: 2021-07-30 | End: 2021-08-29

## 2021-08-24 DIAGNOSIS — I10 ESSENTIAL HYPERTENSION: ICD-10-CM

## 2021-08-24 RX ORDER — ALBUTEROL SULFATE 90 UG/1
AEROSOL, METERED RESPIRATORY (INHALATION)
Qty: 54 G | Refills: 0 | Status: SHIPPED | OUTPATIENT
Start: 2021-08-24 | End: 2021-11-08

## 2021-08-25 RX ORDER — AMLODIPINE BESYLATE 5 MG/1
TABLET ORAL
Qty: 90 TABLET | Refills: 1 | Status: SHIPPED | OUTPATIENT
Start: 2021-08-25 | End: 2022-02-01

## 2021-10-14 ENCOUNTER — TELEPHONE (OUTPATIENT)
Dept: FAMILY MEDICINE | Facility: CLINIC | Age: 76
End: 2021-10-14

## 2021-10-15 DIAGNOSIS — F41.9 ANXIETY: ICD-10-CM

## 2021-10-20 RX ORDER — ALPRAZOLAM 0.25 MG/1
TABLET ORAL
Qty: 60 TABLET | Refills: 2 | Status: SHIPPED | OUTPATIENT
Start: 2021-10-20 | End: 2022-02-02

## 2021-11-01 ENCOUNTER — OFFICE VISIT (OUTPATIENT)
Dept: FAMILY MEDICINE | Facility: CLINIC | Age: 76
End: 2021-11-01
Payer: MEDICARE

## 2021-11-01 VITALS
BODY MASS INDEX: 22.31 KG/M2 | SYSTOLIC BLOOD PRESSURE: 114 MMHG | DIASTOLIC BLOOD PRESSURE: 68 MMHG | OXYGEN SATURATION: 95 % | HEART RATE: 60 BPM | HEIGHT: 62 IN | WEIGHT: 121.25 LBS

## 2021-11-01 DIAGNOSIS — I70.0 ATHEROSCLEROSIS OF AORTA: ICD-10-CM

## 2021-11-01 DIAGNOSIS — M51.36 DDD (DEGENERATIVE DISC DISEASE), LUMBAR: ICD-10-CM

## 2021-11-01 DIAGNOSIS — J44.9 CHRONIC OBSTRUCTIVE PULMONARY DISEASE, UNSPECIFIED COPD TYPE: ICD-10-CM

## 2021-11-01 DIAGNOSIS — M54.16 LUMBAR RADICULOPATHY: ICD-10-CM

## 2021-11-01 DIAGNOSIS — D69.2 SENILE PURPURA: ICD-10-CM

## 2021-11-01 DIAGNOSIS — E78.5 HYPERLIPIDEMIA, UNSPECIFIED HYPERLIPIDEMIA TYPE: ICD-10-CM

## 2021-11-01 DIAGNOSIS — G47.00 INSOMNIA, UNSPECIFIED TYPE: ICD-10-CM

## 2021-11-01 DIAGNOSIS — M50.30 DDD (DEGENERATIVE DISC DISEASE), CERVICAL: ICD-10-CM

## 2021-11-01 DIAGNOSIS — I10 ESSENTIAL HYPERTENSION: Primary | ICD-10-CM

## 2021-11-01 DIAGNOSIS — F41.9 ANXIETY: ICD-10-CM

## 2021-11-01 PROCEDURE — 1159F MED LIST DOCD IN RCRD: CPT | Mod: HCNC,CPTII,S$GLB, | Performed by: FAMILY MEDICINE

## 2021-11-01 PROCEDURE — 3288F PR FALLS RISK ASSESSMENT DOCUMENTED: ICD-10-PCS | Mod: HCNC,CPTII,S$GLB, | Performed by: FAMILY MEDICINE

## 2021-11-01 PROCEDURE — 99499 RISK ADDL DX/OHS AUDIT: ICD-10-PCS | Mod: HCNC,S$GLB,, | Performed by: FAMILY MEDICINE

## 2021-11-01 PROCEDURE — 1125F AMNT PAIN NOTED PAIN PRSNT: CPT | Mod: HCNC,CPTII,S$GLB, | Performed by: FAMILY MEDICINE

## 2021-11-01 PROCEDURE — 99999 PR PBB SHADOW E&M-EST. PATIENT-LVL IV: ICD-10-PCS | Mod: PBBFAC,HCNC,, | Performed by: FAMILY MEDICINE

## 2021-11-01 PROCEDURE — 3078F PR MOST RECENT DIASTOLIC BLOOD PRESSURE < 80 MM HG: ICD-10-PCS | Mod: HCNC,CPTII,S$GLB, | Performed by: FAMILY MEDICINE

## 2021-11-01 PROCEDURE — 99214 PR OFFICE/OUTPT VISIT, EST, LEVL IV, 30-39 MIN: ICD-10-PCS | Mod: HCNC,S$GLB,, | Performed by: FAMILY MEDICINE

## 2021-11-01 PROCEDURE — 99999 PR PBB SHADOW E&M-EST. PATIENT-LVL IV: CPT | Mod: PBBFAC,HCNC,, | Performed by: FAMILY MEDICINE

## 2021-11-01 PROCEDURE — 3288F FALL RISK ASSESSMENT DOCD: CPT | Mod: HCNC,CPTII,S$GLB, | Performed by: FAMILY MEDICINE

## 2021-11-01 PROCEDURE — 3074F SYST BP LT 130 MM HG: CPT | Mod: HCNC,CPTII,S$GLB, | Performed by: FAMILY MEDICINE

## 2021-11-01 PROCEDURE — 1125F PR PAIN SEVERITY QUANTIFIED, PAIN PRESENT: ICD-10-PCS | Mod: HCNC,CPTII,S$GLB, | Performed by: FAMILY MEDICINE

## 2021-11-01 PROCEDURE — 1101F PT FALLS ASSESS-DOCD LE1/YR: CPT | Mod: HCNC,CPTII,S$GLB, | Performed by: FAMILY MEDICINE

## 2021-11-01 PROCEDURE — 1159F PR MEDICATION LIST DOCUMENTED IN MEDICAL RECORD: ICD-10-PCS | Mod: HCNC,CPTII,S$GLB, | Performed by: FAMILY MEDICINE

## 2021-11-01 PROCEDURE — 99499 UNLISTED E&M SERVICE: CPT | Mod: HCNC,S$GLB,, | Performed by: FAMILY MEDICINE

## 2021-11-01 PROCEDURE — 3078F DIAST BP <80 MM HG: CPT | Mod: HCNC,CPTII,S$GLB, | Performed by: FAMILY MEDICINE

## 2021-11-01 PROCEDURE — 1101F PR PT FALLS ASSESS DOC 0-1 FALLS W/OUT INJ PAST YR: ICD-10-PCS | Mod: HCNC,CPTII,S$GLB, | Performed by: FAMILY MEDICINE

## 2021-11-01 PROCEDURE — 99214 OFFICE O/P EST MOD 30 MIN: CPT | Mod: HCNC,S$GLB,, | Performed by: FAMILY MEDICINE

## 2021-11-01 PROCEDURE — 3074F PR MOST RECENT SYSTOLIC BLOOD PRESSURE < 130 MM HG: ICD-10-PCS | Mod: HCNC,CPTII,S$GLB, | Performed by: FAMILY MEDICINE

## 2021-11-01 RX ORDER — HYDROCODONE BITARTRATE AND ACETAMINOPHEN 5; 325 MG/1; MG/1
1 TABLET ORAL EVERY 12 HOURS PRN
Qty: 60 TABLET | Refills: 0 | Status: SHIPPED | OUTPATIENT
Start: 2021-11-16 | End: 2021-12-16

## 2021-11-01 RX ORDER — CLINDAMYCIN HYDROCHLORIDE 300 MG/1
300 CAPSULE ORAL 2 TIMES DAILY
COMMUNITY
End: 2023-03-09

## 2021-11-01 RX ORDER — ZOLPIDEM TARTRATE 10 MG/1
TABLET ORAL
Qty: 30 TABLET | Refills: 5 | Status: SHIPPED | OUTPATIENT
Start: 2021-11-01 | End: 2022-02-21 | Stop reason: SDUPTHER

## 2021-11-01 RX ORDER — HYDROCODONE BITARTRATE AND ACETAMINOPHEN 5; 325 MG/1; MG/1
1 TABLET ORAL EVERY 12 HOURS PRN
Qty: 60 TABLET | Refills: 0 | Status: SHIPPED | OUTPATIENT
Start: 2022-01-15 | End: 2022-02-14

## 2021-11-01 RX ORDER — PRAVASTATIN SODIUM 20 MG/1
20 TABLET ORAL NIGHTLY
Qty: 90 TABLET | Refills: 3 | Status: SHIPPED | OUTPATIENT
Start: 2021-11-01 | End: 2022-11-02

## 2021-11-01 RX ORDER — HYDROCODONE BITARTRATE AND ACETAMINOPHEN 5; 325 MG/1; MG/1
1 TABLET ORAL EVERY 12 HOURS PRN
Qty: 60 TABLET | Refills: 0 | Status: SHIPPED | OUTPATIENT
Start: 2021-12-16 | End: 2022-01-15

## 2021-11-08 RX ORDER — ALBUTEROL SULFATE 90 UG/1
AEROSOL, METERED RESPIRATORY (INHALATION)
Qty: 54 G | Refills: 3 | Status: SHIPPED | OUTPATIENT
Start: 2021-11-08 | End: 2022-12-26

## 2021-11-16 ENCOUNTER — TELEPHONE (OUTPATIENT)
Dept: FAMILY MEDICINE | Facility: CLINIC | Age: 76
End: 2021-11-16
Payer: MEDICARE

## 2021-11-17 ENCOUNTER — TELEPHONE (OUTPATIENT)
Dept: GASTROENTEROLOGY | Facility: CLINIC | Age: 76
End: 2021-11-17
Payer: MEDICARE

## 2022-01-18 DIAGNOSIS — J32.9 SINUSITIS, UNSPECIFIED CHRONICITY, UNSPECIFIED LOCATION: ICD-10-CM

## 2022-01-19 NOTE — TELEPHONE ENCOUNTER
Care Due:                  Date            Visit Type   Department     Provider  --------------------------------------------------------------------------------                                             [facility]  Last Visit: 11-      None         JOVANNY ANDERSON                                           [facility]  Next Visit: 02-      None         JOVANNY ANDERSON                                                            Last  Test          Frequency    Reason                     Performed    Due Date  --------------------------------------------------------------------------------    CMP.........  12 months..  pravastatin..............  Not Found    Overdue    Lipid Panel.  12 months..  pravastatin..............  Not Found    Overdue    Powered by [employer]. Reference number: [id number].   1/18/2022 8:49:27 PM CST

## 2022-01-24 ENCOUNTER — TELEPHONE (OUTPATIENT)
Dept: OPHTHALMOLOGY | Facility: CLINIC | Age: 77
End: 2022-01-24
Payer: MEDICARE

## 2022-01-26 ENCOUNTER — LAB VISIT (OUTPATIENT)
Dept: LAB | Facility: HOSPITAL | Age: 77
End: 2022-01-26
Attending: FAMILY MEDICINE
Payer: MEDICARE

## 2022-01-26 ENCOUNTER — TELEPHONE (OUTPATIENT)
Dept: FAMILY MEDICINE | Facility: CLINIC | Age: 77
End: 2022-01-26
Payer: MEDICARE

## 2022-01-26 DIAGNOSIS — I10 ESSENTIAL HYPERTENSION: ICD-10-CM

## 2022-01-26 LAB
ALBUMIN SERPL BCP-MCNC: 4.1 G/DL (ref 3.5–5.2)
ALP SERPL-CCNC: 81 U/L (ref 55–135)
ALT SERPL W/O P-5'-P-CCNC: 13 U/L (ref 10–44)
ANION GAP SERPL CALC-SCNC: 8 MMOL/L (ref 8–16)
AST SERPL-CCNC: 27 U/L (ref 10–40)
BILIRUB SERPL-MCNC: 0.6 MG/DL (ref 0.1–1)
BUN SERPL-MCNC: 7 MG/DL (ref 8–23)
CALCIUM SERPL-MCNC: 9.6 MG/DL (ref 8.7–10.5)
CHLORIDE SERPL-SCNC: 101 MMOL/L (ref 95–110)
CHOLEST SERPL-MCNC: 167 MG/DL (ref 120–199)
CHOLEST/HDLC SERPL: 2.2 {RATIO} (ref 2–5)
CO2 SERPL-SCNC: 27 MMOL/L (ref 23–29)
CREAT SERPL-MCNC: 1 MG/DL (ref 0.5–1.4)
EST. GFR  (AFRICAN AMERICAN): >60 ML/MIN/1.73 M^2
EST. GFR  (NON AFRICAN AMERICAN): 54.9 ML/MIN/1.73 M^2
GLUCOSE SERPL-MCNC: 91 MG/DL (ref 70–110)
HDLC SERPL-MCNC: 75 MG/DL (ref 40–75)
HDLC SERPL: 44.9 % (ref 20–50)
LDLC SERPL CALC-MCNC: 80.8 MG/DL (ref 63–159)
NONHDLC SERPL-MCNC: 92 MG/DL
POTASSIUM SERPL-SCNC: 4.7 MMOL/L (ref 3.5–5.1)
PROT SERPL-MCNC: 7.6 G/DL (ref 6–8.4)
SODIUM SERPL-SCNC: 136 MMOL/L (ref 136–145)
TRIGL SERPL-MCNC: 56 MG/DL (ref 30–150)

## 2022-01-26 PROCEDURE — 80053 COMPREHEN METABOLIC PANEL: CPT | Mod: HCNC | Performed by: FAMILY MEDICINE

## 2022-01-26 PROCEDURE — 36415 COLL VENOUS BLD VENIPUNCTURE: CPT | Mod: HCNC,PO | Performed by: FAMILY MEDICINE

## 2022-01-26 PROCEDURE — 80061 LIPID PANEL: CPT | Mod: HCNC | Performed by: FAMILY MEDICINE

## 2022-01-26 NOTE — TELEPHONE ENCOUNTER
----- Message from Sammi Irene sent at 1/26/2022  3:47 PM CST -----  Contact: Pt  Type: RX Refill Request  Who Called:Pt  Refill or New RX:Refill  RX Name and Strength:azelastine (ASTELIN) 137 mcg (0.1 %) nasal spray  fluticasone propionate (FLONASE) 50 mcg/actuation nasal spray  ALPRAZolam (XANAX) 0.25 MG tablet  How is the patient currently taking it: As Directed  Is this a 30 or 90 day : as Directed  Preferred Pharmacy/Phone Number:  [id number] - [place] [address] & [address] [address]  Phone: [phone] Fax: [phone]      Best Call Back Number:[phone]    Additional Information :N/A

## 2022-01-28 DIAGNOSIS — J32.9 SINUSITIS, UNSPECIFIED CHRONICITY, UNSPECIFIED LOCATION: ICD-10-CM

## 2022-01-28 RX ORDER — FLUTICASONE PROPIONATE 50 MCG
2 SPRAY, SUSPENSION (ML) NASAL DAILY
Qty: 48 G | Refills: 3 | Status: SHIPPED | OUTPATIENT
Start: 2022-01-28 | End: 2022-02-21 | Stop reason: SDUPTHER

## 2022-01-28 RX ORDER — AZELASTINE 1 MG/ML
2 SPRAY, METERED NASAL 2 TIMES DAILY
Qty: 90 ML | Refills: 3 | Status: SHIPPED | OUTPATIENT
Start: 2022-01-28 | End: 2023-03-05

## 2022-01-28 NOTE — TELEPHONE ENCOUNTER
No new care gaps identified.  Powered by [employer]. Reference number: [id number].   1/28/2022 4:20:59 PM CST

## 2022-01-28 NOTE — TELEPHONE ENCOUNTER
----- Message from Chadwick Sanchez, Patient Care Assistant sent at 1/28/2022  3:57 PM CST -----  Contact: Pt  Type: Needs Medical Advice    Who Called: Pt  Best Call Back Number: [phone]    Inquiry/Question: Pt is calling to see why her prescription refill was denied. Pt states that the pharmacy called and told her that the provider denied the refill. Please call back and advise Thank you~

## 2022-02-03 RX ORDER — AZELASTINE 1 MG/ML
SPRAY, METERED NASAL
Qty: 90 ML | Refills: 3 | OUTPATIENT
Start: 2022-02-03

## 2022-02-03 RX ORDER — FLUTICASONE PROPIONATE 50 MCG
SPRAY, SUSPENSION (ML) NASAL
Qty: 48 G | Refills: 3 | OUTPATIENT
Start: 2022-02-03

## 2022-02-21 ENCOUNTER — TELEPHONE (OUTPATIENT)
Dept: FAMILY MEDICINE | Facility: CLINIC | Age: 77
End: 2022-02-21

## 2022-02-21 ENCOUNTER — OFFICE VISIT (OUTPATIENT)
Dept: FAMILY MEDICINE | Facility: CLINIC | Age: 77
End: 2022-02-21
Payer: MEDICARE

## 2022-02-21 VITALS
OXYGEN SATURATION: 98 % | HEART RATE: 56 BPM | BODY MASS INDEX: 21.69 KG/M2 | WEIGHT: 118.63 LBS | DIASTOLIC BLOOD PRESSURE: 78 MMHG | SYSTOLIC BLOOD PRESSURE: 112 MMHG

## 2022-02-21 DIAGNOSIS — F41.9 ANXIETY: ICD-10-CM

## 2022-02-21 DIAGNOSIS — M50.30 DDD (DEGENERATIVE DISC DISEASE), CERVICAL: ICD-10-CM

## 2022-02-21 DIAGNOSIS — D69.2 SENILE PURPURA: ICD-10-CM

## 2022-02-21 DIAGNOSIS — J31.0 RHINITIS, UNSPECIFIED TYPE: ICD-10-CM

## 2022-02-21 DIAGNOSIS — M79.89 SWELLING OF RIGHT INDEX FINGER: ICD-10-CM

## 2022-02-21 DIAGNOSIS — I70.0 ATHEROSCLEROSIS OF AORTA: ICD-10-CM

## 2022-02-21 DIAGNOSIS — M51.36 DDD (DEGENERATIVE DISC DISEASE), LUMBAR: ICD-10-CM

## 2022-02-21 DIAGNOSIS — I10 ESSENTIAL HYPERTENSION: Primary | ICD-10-CM

## 2022-02-21 DIAGNOSIS — J44.9 CHRONIC OBSTRUCTIVE PULMONARY DISEASE, UNSPECIFIED COPD TYPE: ICD-10-CM

## 2022-02-21 PROCEDURE — 1159F MED LIST DOCD IN RCRD: CPT | Mod: HCNC,CPTII,S$GLB, | Performed by: FAMILY MEDICINE

## 2022-02-21 PROCEDURE — 99214 OFFICE O/P EST MOD 30 MIN: CPT | Mod: HCNC,S$GLB,, | Performed by: FAMILY MEDICINE

## 2022-02-21 PROCEDURE — 3078F DIAST BP <80 MM HG: CPT | Mod: HCNC,CPTII,S$GLB, | Performed by: FAMILY MEDICINE

## 2022-02-21 PROCEDURE — 99999 PR PBB SHADOW E&M-EST. PATIENT-LVL IV: ICD-10-PCS | Mod: PBBFAC,HCNC,, | Performed by: FAMILY MEDICINE

## 2022-02-21 PROCEDURE — 3074F SYST BP LT 130 MM HG: CPT | Mod: HCNC,CPTII,S$GLB, | Performed by: FAMILY MEDICINE

## 2022-02-21 PROCEDURE — 1101F PT FALLS ASSESS-DOCD LE1/YR: CPT | Mod: HCNC,CPTII,S$GLB, | Performed by: FAMILY MEDICINE

## 2022-02-21 PROCEDURE — 3288F PR FALLS RISK ASSESSMENT DOCUMENTED: ICD-10-PCS | Mod: HCNC,CPTII,S$GLB, | Performed by: FAMILY MEDICINE

## 2022-02-21 PROCEDURE — 99214 PR OFFICE/OUTPT VISIT, EST, LEVL IV, 30-39 MIN: ICD-10-PCS | Mod: HCNC,S$GLB,, | Performed by: FAMILY MEDICINE

## 2022-02-21 PROCEDURE — 3288F FALL RISK ASSESSMENT DOCD: CPT | Mod: HCNC,CPTII,S$GLB, | Performed by: FAMILY MEDICINE

## 2022-02-21 PROCEDURE — 1126F PR PAIN SEVERITY QUANTIFIED, NO PAIN PRESENT: ICD-10-PCS | Mod: HCNC,CPTII,S$GLB, | Performed by: FAMILY MEDICINE

## 2022-02-21 PROCEDURE — 3078F PR MOST RECENT DIASTOLIC BLOOD PRESSURE < 80 MM HG: ICD-10-PCS | Mod: HCNC,CPTII,S$GLB, | Performed by: FAMILY MEDICINE

## 2022-02-21 PROCEDURE — 3074F PR MOST RECENT SYSTOLIC BLOOD PRESSURE < 130 MM HG: ICD-10-PCS | Mod: HCNC,CPTII,S$GLB, | Performed by: FAMILY MEDICINE

## 2022-02-21 PROCEDURE — 99999 PR PBB SHADOW E&M-EST. PATIENT-LVL IV: CPT | Mod: PBBFAC,HCNC,, | Performed by: FAMILY MEDICINE

## 2022-02-21 PROCEDURE — 1126F AMNT PAIN NOTED NONE PRSNT: CPT | Mod: HCNC,CPTII,S$GLB, | Performed by: FAMILY MEDICINE

## 2022-02-21 PROCEDURE — 99499 UNLISTED E&M SERVICE: CPT | Mod: S$GLB,,, | Performed by: FAMILY MEDICINE

## 2022-02-21 PROCEDURE — 1159F PR MEDICATION LIST DOCUMENTED IN MEDICAL RECORD: ICD-10-PCS | Mod: HCNC,CPTII,S$GLB, | Performed by: FAMILY MEDICINE

## 2022-02-21 PROCEDURE — 99499 RISK ADDL DX/OHS AUDIT: ICD-10-PCS | Mod: S$GLB,,, | Performed by: FAMILY MEDICINE

## 2022-02-21 PROCEDURE — 1101F PR PT FALLS ASSESS DOC 0-1 FALLS W/OUT INJ PAST YR: ICD-10-PCS | Mod: HCNC,CPTII,S$GLB, | Performed by: FAMILY MEDICINE

## 2022-02-21 RX ORDER — ZOLPIDEM TARTRATE 10 MG/1
TABLET ORAL
Qty: 30 TABLET | Refills: 5 | Status: SHIPPED | OUTPATIENT
Start: 2022-02-21 | End: 2022-09-02

## 2022-02-21 RX ORDER — ALPRAZOLAM 0.25 MG/1
TABLET ORAL
Qty: 60 TABLET | Refills: 5 | Status: SHIPPED | OUTPATIENT
Start: 2022-02-21 | End: 2022-08-26

## 2022-02-21 RX ORDER — HYDROCODONE BITARTRATE AND ACETAMINOPHEN 5; 325 MG/1; MG/1
1 TABLET ORAL 2 TIMES DAILY PRN
Qty: 60 TABLET | Refills: 0 | Status: SHIPPED | OUTPATIENT
Start: 2022-03-29 | End: 2022-04-28

## 2022-02-21 RX ORDER — HYDROCODONE BITARTRATE AND ACETAMINOPHEN 5; 325 MG/1; MG/1
1 TABLET ORAL 2 TIMES DAILY PRN
Qty: 60 TABLET | Refills: 0 | Status: SHIPPED | OUTPATIENT
Start: 2022-04-28 | End: 2022-05-19 | Stop reason: SDUPTHER

## 2022-02-21 RX ORDER — HYDROCODONE BITARTRATE AND ACETAMINOPHEN 5; 325 MG/1; MG/1
1 TABLET ORAL 2 TIMES DAILY PRN
Qty: 60 TABLET | Refills: 0 | Status: SHIPPED | OUTPATIENT
Start: 2022-02-27 | End: 2022-03-29

## 2022-02-21 RX ORDER — FLUTICASONE PROPIONATE 50 MCG
2 SPRAY, SUSPENSION (ML) NASAL DAILY
Qty: 48 G | Refills: 3 | Status: SHIPPED | OUTPATIENT
Start: 2022-02-21 | End: 2023-03-17

## 2022-02-21 NOTE — PROGRESS NOTES
Subjective:       Patient ID: Luis Miguel Murcia is a 76 y.o. female.    Chief Complaint: Medication Refill    HPI     Here for a f/u.     htn is stable. No c/o cp or sob at rest or exertion. No headaches.      C/o chronic right lumbar radicular pain > 1 year. Ps: 2-3/10 while on norco. Reports mild relief from poppy L5-S1 in 7/5/2018. Has residual constant neuropathy from right calf to right foot which is worse at night.  Takes gabapentin 300 mg qhs for the neuropathy.      Also, has chronic neck pain > 1 year. Ps: 2-3/10 while on norco       Anxiety stable while on xanax.      Insomnia stable while on ambien.     Gets purpura on skin. Stable.      Atherosclerosis of aorta monitored and stable.      On pravastatin for hld. Stable.     Reports swelling right index finger pip joint swelling x 6 months. Difficulty with flexion.         Review of Systems      Review of Systems   Constitutional: Negative for fever and chills.   HENT: Negative for hearing loss and neck stiffness.    Eyes: Negative for redness and itching.   Respiratory: Negative for cough and choking.    Cardiovascular: Negative for chest pain and leg swelling.  Abdomen: Negative for abdominal pain and blood in stool.   Genitourinary: Negative for dysuria and flank pain.   Neurological: Negative for light-headedness and headaches.   Hematological: Negative for adenopathy.       Objective:      Physical Exam  Constitutional:       Appearance: She is well-developed.   HENT:      Head: Normocephalic and atraumatic.   Eyes:      Conjunctiva/sclera: Conjunctivae normal.      Pupils: Pupils are equal, round, and reactive to light.   Cardiovascular:      Rate and Rhythm: Normal rate and regular rhythm.      Heart sounds: No murmur heard.  Pulmonary:      Effort: Pulmonary effort is normal.      Breath sounds: Normal breath sounds.   Musculoskeletal:      Cervical back: Normal range of motion.      Comments: Right index pip joint: swollen. Non tender. Flex to  90 deg only   Lymphadenopathy:      Cervical: No cervical adenopathy.         Assessment:       1. Essential hypertension    2. Senile purpura    3. Chronic obstructive pulmonary disease, unspecified COPD type    4. Atherosclerosis of aorta    5. Swelling of right index finger    6. Anxiety    7. Rhinitis, unspecified type    8. DDD (degenerative disc disease), lumbar    9. DDD (degenerative disc disease), cervical        Plan:       Essential hypertension    Senile purpura    Chronic obstructive pulmonary disease, unspecified COPD type    Atherosclerosis of aorta    Swelling of right index finger  -     X-Ray Hand Complete Right; Future; Expected date: 02/21/2022    Anxiety  -     ALPRAZolam (XANAX) 0.25 MG tablet; TAKE 1 TABLET(0.25 MG) BY MOUTH TWICE DAILY AS NEEDED FOR ANXIETY  Dispense: 60 tablet; Refill: 5    Rhinitis, unspecified type  -     fluticasone propionate (FLONASE) 50 mcg/actuation nasal spray; 2 sprays (100 mcg total) by Each Nostril route once daily.  Dispense: 48 g; Refill: 3    DDD (degenerative disc disease), lumbar    DDD (degenerative disc disease), cervical    Other orders  -     zolpidem (AMBIEN) 10 mg Tab; TAKE 1 TABLET(10 MG) BY MOUTH EVERY EVENING  Dispense: 30 tablet; Refill: 5  -     HYDROcodone-acetaminophen (NORCO) 5-325 mg per tablet; Take 1 tablet by mouth 2 (two) times daily as needed for Pain.  Dispense: 60 tablet; Refill: 0  -     HYDROcodone-acetaminophen (NORCO) 5-325 mg per tablet; Take 1 tablet by mouth 2 (two) times daily as needed for Pain.  Dispense: 60 tablet; Refill: 0  -     HYDROcodone-acetaminophen (NORCO) 5-325 mg per tablet; Take 1 tablet by mouth 2 (two) times daily as needed for Pain.  Dispense: 60 tablet; Refill: 0                Plan:  Xray right hand and refer to ortho  Cont current meds      Medication List with Changes/Refills   New Medications    HYDROCODONE-ACETAMINOPHEN (NORCO) 5-325 MG PER TABLET    Take 1 tablet by mouth 2 (two) times daily as needed for  Pain.    HYDROCODONE-ACETAMINOPHEN (NORCO) 5-325 MG PER TABLET    Take 1 tablet by mouth 2 (two) times daily as needed for Pain.    HYDROCODONE-ACETAMINOPHEN (NORCO) 5-325 MG PER TABLET    Take 1 tablet by mouth 2 (two) times daily as needed for Pain.   Current Medications    ALBUTEROL (PROVENTIL/VENTOLIN HFA) 90 MCG/ACTUATION INHALER    INHALE 2 PUFFS BY MOUTH EVERY 6 HOURS AS NEEDED FOR WHEEZING    AMLODIPINE (NORVASC) 5 MG TABLET    Take 1 tablet (5 mg total) by mouth once daily.    ASCORBATE CALCIUM (VITAMIN C ORAL)    Take 1,000 mg by mouth once daily.    ASPIRIN (ECOTRIN) 81 MG EC TABLET    Take 81 mg by mouth once daily.    AZELASTINE (ASTELIN) 137 MCG (0.1 %) NASAL SPRAY    2 sprays (274 mcg total) by Nasal route 2 (two) times daily.    B COMPLEX VITAMINS TABLET    Take 1 tablet by mouth once daily.    CALCIUM CITRATE-VITAMIN D3 ORAL        CARVEDILOL (COREG) 12.5 MG TABLET    TAKE 1 TABLET(12.5 MG) BY MOUTH TWICE DAILY WITH MEALS    CLINDAMYCIN (CLEOCIN) 300 MG CAPSULE    Take 300 mg by mouth 2 (two) times a day.    CO-ENZYME Q-10 30 MG CAPSULE        DEXLANSOPRAZOLE (DEXILANT) 60 MG CAPSULE    TAKE 1 CAPSULE(60 MG) BY MOUTH EVERY MORNING BEFORE BREAKFAST    FAMOTIDINE (PEPCID) 20 MG TABLET    TAKE 1 TABLET BY MOUTH TWICE DAILY    GABAPENTIN (NEURONTIN) 600 MG TABLET    TAKE 1 TABLET(600 MG) BY MOUTH EVERY EVENING    IBANDRONATE (BONIVA) 150 MG TABLET    TAKE 1 TABLET(150 MG) BY MOUTH EVERY 30 DAYS    ONDANSETRON (ZOFRAN-ODT) 4 MG TBDL    DIS 1 T UNT Q 4 H PRN N    PRAVASTATIN (PRAVACHOL) 20 MG TABLET    Take 1 tablet (20 mg total) by mouth every evening.    VITAMIN B-12 1000 MCG TABLET    Take 1,000 mcg by mouth once daily.    Changed and/or Refilled Medications    Modified Medication Previous Medication    ALPRAZOLAM (XANAX) 0.25 MG TABLET ALPRAZolam (XANAX) 0.25 MG tablet       TAKE 1 TABLET(0.25 MG) BY MOUTH TWICE DAILY AS NEEDED FOR ANXIETY    TAKE 1 TABLET(0.25 MG) BY MOUTH TWICE DAILY AS NEEDED FOR  ANXIETY    FLUTICASONE PROPIONATE (FLONASE) 50 MCG/ACTUATION NASAL SPRAY fluticasone propionate (FLONASE) 50 mcg/actuation nasal spray       2 sprays (100 mcg total) by Each Nostril route once daily.    2 sprays (100 mcg total) by Each Nostril route once daily.    ZOLPIDEM (AMBIEN) 10 MG TAB zolpidem (AMBIEN) 10 mg Tab       TAKE 1 TABLET(10 MG) BY MOUTH EVERY EVENING    TAKE 1 TABLET(10 MG) BY MOUTH EVERY EVENING   Discontinued Medications    DOXYCYCLINE (MONODOX) 100 MG CAPSULE    Take 1 capsule (100 mg total) by mouth 2 (two) times daily.

## 2022-02-23 ENCOUNTER — HOSPITAL ENCOUNTER (OUTPATIENT)
Dept: RADIOLOGY | Facility: HOSPITAL | Age: 77
Discharge: HOME | End: 2022-02-23
Attending: FAMILY MEDICINE
Payer: MEDICARE

## 2022-02-23 DIAGNOSIS — M79.89 SWELLING OF RIGHT INDEX FINGER: ICD-10-CM

## 2022-02-23 PROCEDURE — 73130 XR HAND COMPLETE 3 VIEW RIGHT: ICD-10-PCS | Mod: 26,HCNC,RT, | Performed by: RADIOLOGY

## 2022-02-23 PROCEDURE — 73130 X-RAY EXAM OF HAND: CPT | Mod: 26,HCNC,RT, | Performed by: RADIOLOGY

## 2022-02-23 PROCEDURE — 73130 X-RAY EXAM OF HAND: CPT | Mod: TC,HCNC,FY,PO,RT

## 2022-03-13 ENCOUNTER — TELEPHONE (OUTPATIENT)
Dept: FAMILY MEDICINE | Facility: CLINIC | Age: 77
End: 2022-03-13
Payer: MEDICARE

## 2022-03-13 NOTE — TELEPHONE ENCOUNTER
inform pt via phone that I reviewed the test results and note the following:    Xray of right index finger shows that bone is slightly out of the joint hence the swelling.      Please schedule referral to ortho

## 2022-04-11 DIAGNOSIS — I10 ESSENTIAL HYPERTENSION: ICD-10-CM

## 2022-04-11 NOTE — TELEPHONE ENCOUNTER
No new care gaps identified.  Powered by [employer]. Reference number: [id number].   4/11/2022 4:39:41 PM CDT

## 2022-04-13 RX ORDER — CARVEDILOL 12.5 MG/1
TABLET ORAL
Qty: 180 TABLET | Refills: 3 | Status: SHIPPED | OUTPATIENT
Start: 2022-04-13 | End: 2023-02-02 | Stop reason: SDUPTHER

## 2022-04-13 NOTE — TELEPHONE ENCOUNTER
Refill Routing Note   Medication(s) are not appropriate for processing by [facility] for the following reason(s):      - Drug-Disease Interaction ( carvediloL and Chronic obstructive pulmonary disease, unspecified COPD type)    [facility] action(s):  Defer Medication-related problems identified: Drug-disease interaction     Medication Therapy Plan: DDzi; Defer to you  Medication reconciliation completed: No     Appointments  past 12m or future 3m with PCP    Date Provider   Last Visit   2/21/2022 Juan Francisco Jeong MD   Next Visit   5/19/2022 Juan Francisco Jeong MD   ED visits in past 90 days: 0        Note composed:9:26 PM 04/12/2022

## 2022-04-18 ENCOUNTER — TELEPHONE (OUTPATIENT)
Dept: FAMILY MEDICINE | Facility: CLINIC | Age: 77
End: 2022-04-18

## 2022-05-10 NOTE — DISCHARGE INSTRUCTIONS
Recovery After Procedural Sedation (Adult)  You have been given medicine by vein to make you sleep during your surgery. This may have included both a pain medicine and sleeping medicine. Most of the effects have worn off. But you may still have some drowsiness for the next 6 to 8 hours.  Home care  Follow these guidelines when you get home:  · For the next 8 hours, you should be watched by a responsible adult. This person should make sure your condition is not getting worse.  · Don't drink any alcohol for the next 24 hours.  · Don't drive, operate dangerous machinery, or make important business or personal decisions during the next 24 hours.  Note: Your healthcare provider may tell you not to take any medicine by mouth for pain or sleep in the next 4 hours. These medicines may react with the medicines you were given in the hospital. This could cause a much stronger response than usual.  Follow-up care  Follow up with your healthcare provider if you are not alert and back to your usual level of activity within 12 hours.  When to seek medical advice  Call your healthcare provider right away if any of these occur:  · Drowsiness gets worse  · Weakness or dizziness gets worse  · Repeated vomiting  · You can't be awakened   Date Last Reviewed: 10/18/2016  © 5888-6048 [employer]. [address] [place] [address], [place] [address]. All rights reserved. This information is not intended as a substitute for professional medical care. Always follow your healthcare professional's instructions.    Home care instructions  Apply ice pack to the injection site for 20 minutes periods for the first 24 hrs for soreness/discomfort at injection site DO NOT USE HEAT FOR 24 HOURS  Keep site clean and dry for 24 hours, remove bandaid when desired  Do not drive until tomorrow  Take care when walking after a lumbar injection  Avoid strenuous activities for 2 days  Make take 2 weeks to feel the full effects   Resume home medication as  prescribed today  Resume Aspirin, Plavix, or Coumadin the day after the procedure unless otherwise instructed.    SEE IMMEDIATE MEDICAL HELP FOR:  Severe increase in your usual pain or appearance of new pain  Prolonged or increasing weakness or numbness in the legs or arms  Drainage, redness, active bleeding, or increased swelling at the injection site  Temperature over 100.0 degrees F.  Headache that increases when your head is upright and decreases when you lie flat    CALL 911 OR GO DIRECTLY TO EMERGENCY DEPARTMENT FOR:  Shortness of breath, chest pain, or problems breathing     Yes

## 2022-05-19 ENCOUNTER — TELEPHONE (OUTPATIENT)
Dept: FAMILY MEDICINE | Facility: CLINIC | Age: 77
End: 2022-05-19

## 2022-05-19 ENCOUNTER — OFFICE VISIT (OUTPATIENT)
Dept: FAMILY MEDICINE | Facility: CLINIC | Age: 77
End: 2022-05-19
Payer: MEDICARE

## 2022-05-19 VITALS
SYSTOLIC BLOOD PRESSURE: 104 MMHG | DIASTOLIC BLOOD PRESSURE: 60 MMHG | WEIGHT: 118.38 LBS | OXYGEN SATURATION: 97 % | BODY MASS INDEX: 21.65 KG/M2 | HEART RATE: 68 BPM | TEMPERATURE: 99 F

## 2022-05-19 DIAGNOSIS — M79.89 SWELLING OF RIGHT INDEX FINGER: ICD-10-CM

## 2022-05-19 DIAGNOSIS — M51.36 DDD (DEGENERATIVE DISC DISEASE), LUMBAR: ICD-10-CM

## 2022-05-19 DIAGNOSIS — I10 ESSENTIAL HYPERTENSION: Primary | ICD-10-CM

## 2022-05-19 DIAGNOSIS — I70.0 ATHEROSCLEROSIS OF AORTA: ICD-10-CM

## 2022-05-19 DIAGNOSIS — M50.30 DDD (DEGENERATIVE DISC DISEASE), CERVICAL: ICD-10-CM

## 2022-05-19 DIAGNOSIS — D69.2 SENILE PURPURA: ICD-10-CM

## 2022-05-19 DIAGNOSIS — G47.00 INSOMNIA, UNSPECIFIED TYPE: ICD-10-CM

## 2022-05-19 DIAGNOSIS — F41.9 ANXIETY: ICD-10-CM

## 2022-05-19 DIAGNOSIS — E78.5 HYPERLIPIDEMIA, UNSPECIFIED HYPERLIPIDEMIA TYPE: ICD-10-CM

## 2022-05-19 PROCEDURE — 3078F PR MOST RECENT DIASTOLIC BLOOD PRESSURE < 80 MM HG: ICD-10-PCS | Mod: CPTII,S$GLB,, | Performed by: FAMILY MEDICINE

## 2022-05-19 PROCEDURE — 99214 PR OFFICE/OUTPT VISIT, EST, LEVL IV, 30-39 MIN: ICD-10-PCS | Mod: S$GLB,,, | Performed by: FAMILY MEDICINE

## 2022-05-19 PROCEDURE — 1101F PT FALLS ASSESS-DOCD LE1/YR: CPT | Mod: CPTII,S$GLB,, | Performed by: FAMILY MEDICINE

## 2022-05-19 PROCEDURE — 1125F PR PAIN SEVERITY QUANTIFIED, PAIN PRESENT: ICD-10-PCS | Mod: CPTII,S$GLB,, | Performed by: FAMILY MEDICINE

## 2022-05-19 PROCEDURE — 3074F PR MOST RECENT SYSTOLIC BLOOD PRESSURE < 130 MM HG: ICD-10-PCS | Mod: CPTII,S$GLB,, | Performed by: FAMILY MEDICINE

## 2022-05-19 PROCEDURE — 1159F PR MEDICATION LIST DOCUMENTED IN MEDICAL RECORD: ICD-10-PCS | Mod: CPTII,S$GLB,, | Performed by: FAMILY MEDICINE

## 2022-05-19 PROCEDURE — 99999 PR PBB SHADOW E&M-EST. PATIENT-LVL IV: CPT | Mod: PBBFAC,,, | Performed by: FAMILY MEDICINE

## 2022-05-19 PROCEDURE — 3078F DIAST BP <80 MM HG: CPT | Mod: CPTII,S$GLB,, | Performed by: FAMILY MEDICINE

## 2022-05-19 PROCEDURE — 99214 OFFICE O/P EST MOD 30 MIN: CPT | Mod: S$GLB,,, | Performed by: FAMILY MEDICINE

## 2022-05-19 PROCEDURE — 99214 OFFICE O/P EST MOD 30 MIN: CPT | Mod: PBBFAC,PO | Performed by: FAMILY MEDICINE

## 2022-05-19 PROCEDURE — 99499 UNLISTED E&M SERVICE: CPT | Mod: S$GLB,,, | Performed by: FAMILY MEDICINE

## 2022-05-19 PROCEDURE — 3288F FALL RISK ASSESSMENT DOCD: CPT | Mod: CPTII,S$GLB,, | Performed by: FAMILY MEDICINE

## 2022-05-19 PROCEDURE — 3288F PR FALLS RISK ASSESSMENT DOCUMENTED: ICD-10-PCS | Mod: CPTII,S$GLB,, | Performed by: FAMILY MEDICINE

## 2022-05-19 PROCEDURE — 1160F PR REVIEW ALL MEDS BY PRESCRIBER/CLIN PHARMACIST DOCUMENTED: ICD-10-PCS | Mod: CPTII,S$GLB,, | Performed by: FAMILY MEDICINE

## 2022-05-19 PROCEDURE — 1125F AMNT PAIN NOTED PAIN PRSNT: CPT | Mod: CPTII,S$GLB,, | Performed by: FAMILY MEDICINE

## 2022-05-19 PROCEDURE — 1160F RVW MEDS BY RX/DR IN RCRD: CPT | Mod: CPTII,S$GLB,, | Performed by: FAMILY MEDICINE

## 2022-05-19 PROCEDURE — 3074F SYST BP LT 130 MM HG: CPT | Mod: CPTII,S$GLB,, | Performed by: FAMILY MEDICINE

## 2022-05-19 PROCEDURE — 99999 PR PBB SHADOW E&M-EST. PATIENT-LVL IV: ICD-10-PCS | Mod: PBBFAC,,, | Performed by: FAMILY MEDICINE

## 2022-05-19 PROCEDURE — 1101F PR PT FALLS ASSESS DOC 0-1 FALLS W/OUT INJ PAST YR: ICD-10-PCS | Mod: CPTII,S$GLB,, | Performed by: FAMILY MEDICINE

## 2022-05-19 PROCEDURE — 99499 RISK ADDL DX/OHS AUDIT: ICD-10-PCS | Mod: S$GLB,,, | Performed by: FAMILY MEDICINE

## 2022-05-19 PROCEDURE — 1159F MED LIST DOCD IN RCRD: CPT | Mod: CPTII,S$GLB,, | Performed by: FAMILY MEDICINE

## 2022-05-19 RX ORDER — HYDROCODONE BITARTRATE AND ACETAMINOPHEN 5; 325 MG/1; MG/1
1 TABLET ORAL 2 TIMES DAILY PRN
Qty: 60 TABLET | Refills: 0 | Status: SHIPPED | OUTPATIENT
Start: 2022-07-10 | End: 2022-08-09

## 2022-05-19 RX ORDER — HYDROCODONE BITARTRATE AND ACETAMINOPHEN 5; 325 MG/1; MG/1
1 TABLET ORAL 2 TIMES DAILY PRN
Qty: 60 TABLET | Refills: 0 | Status: SHIPPED | OUTPATIENT
Start: 2022-06-10 | End: 2022-07-10

## 2022-05-19 NOTE — PROGRESS NOTES
Subjective:       Patient ID: Luis Miguel Murcia is a 77 y.o. female.    Chief Complaint: Follow-up (3 mon)    HPI     Here for a f/u.     htn is stable. No c/o cp or sob at rest or exertion. No headaches.      C/o chronic right lumbar radicular pain > 1 year. Ps: 2-3/10 while on norco. Reports mild relief from poppy L5-S1 in 7/5/2018. Has residual constant neuropathy from right calf to right foot which is worse at night.  Takes gabapentin 300 mg qhs for the neuropathy.      Also, has chronic neck pain > 1 year. Ps: 2-3/10 while on norco       Anxiety stable while on xanax.      Insomnia stable while on ambien.     Gets purpura on skin. Stable.      Atherosclerosis of aorta monitored and stable.      On pravastatin for hld. Stable.     Reports swelling right index finger pip joint swelling x 12 months. Difficulty with flexion. xray of right hand did show ulnar subluxation of the 2nd PIP joint. No hx of trauma according to patient.          Review of Systems      Review of Systems   Constitutional: Negative for fever and chills.   HENT: Negative for hearing loss and neck stiffness.    Eyes: Negative for redness and itching.   Respiratory: Negative for cough and choking.    Cardiovascular: Negative for chest pain and leg swelling.  Abdomen: Negative for abdominal pain and blood in stool.   Genitourinary: Negative for dysuria and flank pain.   Neurological: Negative for light-headedness and headaches.   Hematological: Negative for adenopathy.   Psychiatric/Behavioral: Negative for behavioral problems.     Objective:      Physical Exam  Constitutional:       Appearance: She is well-developed.   HENT:      Head: Normocephalic and atraumatic.   Eyes:      Conjunctiva/sclera: Conjunctivae normal.      Pupils: Pupils are equal, round, and reactive to light.   Cardiovascular:      Rate and Rhythm: Normal rate and regular rhythm.      Heart sounds: No murmur heard.  Pulmonary:      Effort: Pulmonary effort is normal.       Breath sounds: Normal breath sounds.   Musculoskeletal:      Cervical back: Normal range of motion.      Comments: Right index pip joint: swollen. Non tender. Flex to 70 deg only    Lymphadenopathy:      Cervical: No cervical adenopathy.         Assessment:       1. Essential hypertension    2. Swelling of right index finger    3. Senile purpura    4. Atherosclerosis of aorta    5. Anxiety    6. DDD (degenerative disc disease), lumbar    7. DDD (degenerative disc disease), cervical    8. Insomnia, unspecified type    9. Hyperlipidemia, unspecified hyperlipidemia type        Plan:       Essential hypertension    Swelling of right index finger  -     Ambulatory referral/consult to Orthopedics; Future    Senile purpura    Atherosclerosis of aorta    Anxiety    DDD (degenerative disc disease), lumbar    DDD (degenerative disc disease), cervical    Insomnia, unspecified type    Hyperlipidemia, unspecified hyperlipidemia type    Other orders  -     HYDROcodone-acetaminophen (NORCO) 5-325 mg per tablet; Take 1 tablet by mouth 2 (two) times daily as needed for Pain.  Dispense: 60 tablet; Refill: 0  -     HYDROcodone-acetaminophen (NORCO) 5-325 mg per tablet; Take 1 tablet by mouth 2 (two) times daily as needed for Pain.  Dispense: 60 tablet; Refill: 0              Plan:  rf norco 5  Placed referral to hand orthopedics  Cont current meds      Medication List with Changes/Refills   New Medications    HYDROCODONE-ACETAMINOPHEN (NORCO) 5-325 MG PER TABLET    Take 1 tablet by mouth 2 (two) times daily as needed for Pain.   Current Medications    ALBUTEROL (PROVENTIL/VENTOLIN HFA) 90 MCG/ACTUATION INHALER    INHALE 2 PUFFS BY MOUTH EVERY 6 HOURS AS NEEDED FOR WHEEZING    ALPRAZOLAM (XANAX) 0.25 MG TABLET    TAKE 1 TABLET(0.25 MG) BY MOUTH TWICE DAILY AS NEEDED FOR ANXIETY    AMLODIPINE (NORVASC) 5 MG TABLET    Take 1 tablet (5 mg total) by mouth once daily.    ASCORBATE CALCIUM (VITAMIN C ORAL)    Take 1,000 mg by mouth once  daily.    ASPIRIN (ECOTRIN) 81 MG EC TABLET    Take 81 mg by mouth once daily.    AZELASTINE (ASTELIN) 137 MCG (0.1 %) NASAL SPRAY    2 sprays (274 mcg total) by Nasal route 2 (two) times daily.    B COMPLEX VITAMINS TABLET    Take 1 tablet by mouth once daily.    CALCIUM CITRATE-VITAMIN D3 ORAL        CARVEDILOL (COREG) 12.5 MG TABLET    TAKE 1 TABLET(12.5 MG) BY MOUTH TWICE DAILY WITH MEALS    CLINDAMYCIN (CLEOCIN) 300 MG CAPSULE    Take 300 mg by mouth 2 (two) times a day.    CO-ENZYME Q-10 30 MG CAPSULE        DEXLANSOPRAZOLE (DEXILANT) 60 MG CAPSULE    TAKE 1 CAPSULE(60 MG) BY MOUTH EVERY MORNING BEFORE BREAKFAST    FAMOTIDINE (PEPCID) 20 MG TABLET    TAKE 1 TABLET BY MOUTH TWICE DAILY    FLUTICASONE PROPIONATE (FLONASE) 50 MCG/ACTUATION NASAL SPRAY    2 sprays (100 mcg total) by Each Nostril route once daily.    GABAPENTIN (NEURONTIN) 600 MG TABLET    TAKE 1 TABLET(600 MG) BY MOUTH EVERY EVENING    IBANDRONATE (BONIVA) 150 MG TABLET    TAKE 1 TABLET(150 MG) BY MOUTH EVERY 30 DAYS    ONDANSETRON (ZOFRAN-ODT) 4 MG TBDL    DIS 1 T UNT Q 4 H PRN N    PRAVASTATIN (PRAVACHOL) 20 MG TABLET    Take 1 tablet (20 mg total) by mouth every evening.    VITAMIN B-12 1000 MCG TABLET    Take 1,000 mcg by mouth once daily.     ZOLPIDEM (AMBIEN) 10 MG TAB    TAKE 1 TABLET(10 MG) BY MOUTH EVERY EVENING   Changed and/or Refilled Medications    Modified Medication Previous Medication    HYDROCODONE-ACETAMINOPHEN (NORCO) 5-325 MG PER TABLET HYDROcodone-acetaminophen (NORCO) 5-325 mg per tablet       Take 1 tablet by mouth 2 (two) times daily as needed for Pain.    Take 1 tablet by mouth 2 (two) times daily as needed for Pain.

## 2022-05-19 NOTE — TELEPHONE ENCOUNTER
----- Message from Radha Rancho sent at 5/19/2022 10:19 AM CDT -----  Patient Call Back    Who Called: PT     What is the request in detail: pt calling to speak with someone regarding rescheduling her appointment. The pt stated that her A/C went out in her car and she is putting it in the shop. The pt would like to know if she can push the appointment back a week. Pls advise.    Can the clinic reply by [facility]?    Best Call Back Number: [phone]

## 2022-06-01 ENCOUNTER — TELEPHONE (OUTPATIENT)
Dept: FAMILY MEDICINE | Facility: CLINIC | Age: 77
End: 2022-06-01
Payer: MEDICARE

## 2022-08-08 ENCOUNTER — TELEPHONE (OUTPATIENT)
Dept: FAMILY MEDICINE | Facility: CLINIC | Age: 77
End: 2022-08-08
Payer: MEDICARE

## 2022-08-08 NOTE — TELEPHONE ENCOUNTER
[phone] number is not in service, no way to leave a message      [facility] to call us to reschedule her [phone]

## 2022-08-26 ENCOUNTER — OFFICE VISIT (OUTPATIENT)
Dept: ORTHOPEDICS | Facility: CLINIC | Age: 77
End: 2022-08-26
Payer: MEDICARE

## 2022-08-26 DIAGNOSIS — S63.238A: Primary | ICD-10-CM

## 2022-08-26 DIAGNOSIS — M19.041 OSTEOARTHRITIS OF RIGHT HAND, UNSPECIFIED OSTEOARTHRITIS TYPE: ICD-10-CM

## 2022-08-26 PROCEDURE — 99999 PR PBB SHADOW E&M-EST. PATIENT-LVL III: ICD-10-PCS | Mod: PBBFAC,,, | Performed by: PHYSICIAN ASSISTANT

## 2022-08-26 PROCEDURE — 20600 SMALL JOINT ASPIRATION/INJECTION: R INDEX PIP: ICD-10-PCS | Mod: RT,S$GLB,, | Performed by: PHYSICIAN ASSISTANT

## 2022-08-26 PROCEDURE — 20600 DRAIN/INJ JOINT/BURSA W/O US: CPT | Mod: RT,S$GLB,, | Performed by: PHYSICIAN ASSISTANT

## 2022-08-26 PROCEDURE — 1125F AMNT PAIN NOTED PAIN PRSNT: CPT | Mod: CPTII,S$GLB,, | Performed by: PHYSICIAN ASSISTANT

## 2022-08-26 PROCEDURE — 1125F PR PAIN SEVERITY QUANTIFIED, PAIN PRESENT: ICD-10-PCS | Mod: CPTII,S$GLB,, | Performed by: PHYSICIAN ASSISTANT

## 2022-08-26 PROCEDURE — 99999 PR PBB SHADOW E&M-EST. PATIENT-LVL III: CPT | Mod: PBBFAC,,, | Performed by: PHYSICIAN ASSISTANT

## 2022-08-26 PROCEDURE — 1159F MED LIST DOCD IN RCRD: CPT | Mod: CPTII,S$GLB,, | Performed by: PHYSICIAN ASSISTANT

## 2022-08-26 PROCEDURE — 99203 PR OFFICE/OUTPT VISIT, NEW, LEVL III, 30-44 MIN: ICD-10-PCS | Mod: 25,S$GLB,, | Performed by: PHYSICIAN ASSISTANT

## 2022-08-26 PROCEDURE — 99203 OFFICE O/P NEW LOW 30 MIN: CPT | Mod: 25,S$GLB,, | Performed by: PHYSICIAN ASSISTANT

## 2022-08-26 PROCEDURE — 1159F PR MEDICATION LIST DOCUMENTED IN MEDICAL RECORD: ICD-10-PCS | Mod: CPTII,S$GLB,, | Performed by: PHYSICIAN ASSISTANT

## 2022-08-26 RX ORDER — DEXAMETHASONE SODIUM PHOSPHATE 4 MG/ML
4 INJECTION, SOLUTION INTRA-ARTICULAR; INTRALESIONAL; INTRAMUSCULAR; INTRAVENOUS; SOFT TISSUE
Status: DISCONTINUED | OUTPATIENT
Start: 2022-08-26 | End: 2022-08-26 | Stop reason: HOSPADM

## 2022-08-26 RX ADMIN — DEXAMETHASONE SODIUM PHOSPHATE 4 MG: 4 INJECTION, SOLUTION INTRA-ARTICULAR; INTRALESIONAL; INTRAMUSCULAR; INTRAVENOUS; SOFT TISSUE at 02:08

## 2022-08-26 NOTE — PROCEDURES
Small Joint Aspiration/Injection: R index PIP    Date/Time: 8/26/2022 2:30 PM  Performed by: Jamie L. Russo-Digeorge, PA-C  Authorized by: Jamie L. Russo-Digeorge, PA-C     Consent Done?:  Yes (Verbal)  Indications:  Pain  Site marked: the procedure site was marked    Timeout: prior to procedure the correct patient, procedure, and site was verified    Prep: patient was prepped and draped in usual sterile fashion      Local anesthetic:  Lidocaine 1% without epinephrine  Location:  Index finger  Site:  R index PIP  Ultrasonic guidance for needle placement?: No    Needle size:  25 G  Approach:  Dorsal  Medications:  4 mg dexamethasone 4 mg/mL  Patient tolerance:  Patient tolerated the procedure well with no immediate complications
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
## 2022-08-31 ENCOUNTER — TELEPHONE (OUTPATIENT)
Dept: FAMILY MEDICINE | Facility: CLINIC | Age: 77
End: 2022-08-31
Payer: MEDICARE

## 2022-08-31 NOTE — TELEPHONE ENCOUNTER
No new care gaps identified.  [facility] Embedded Care Gaps. Reference number: [id number]. 8/31/2022   11:30:44 AM LOPEZT

## 2022-08-31 NOTE — TELEPHONE ENCOUNTER
Pt requesting Norco, not in chart.     Trying to get her scheduled to see you, she had a visit we had to reschedule and now had to reschedule again.    Pt stated she is not home and doesn't know when she will return, she is staying with daughter in [facility].

## 2022-08-31 NOTE — TELEPHONE ENCOUNTER
----- Message from Davion Graham sent at 8/31/2022 11:03 AM CDT -----  Type:  RX Refill Request    Who Called:  patient  Refill or New Rx:  refill  RX Name and Strength:  Hydrocodone  How is the patient currently taking it? (ex. 1XDay):    Is this a 30 day or 90 day RX:    Preferred Pharmacy with phone number:      [id number] - [address]  [address] AT [facility] [address]  [address] [address]  Phone: [phone] Fax: [phone]     Local or Mail Order:  local  Ordering Provider:  Jean-Paul Bowie Call Back Number:  [phone]   Additional Information:

## 2022-08-31 NOTE — TELEPHONE ENCOUNTER
----- Message from Cr Ricci sent at 8/31/2022  1:36 PM CDT -----  Contact: self  Type: Patient Returning Call        Who Called: Patient  Who Left Message for Patient: Nurse Jin  Does the patient know what this is regarding?: n/a  Best Call Back Number: [phone]  Additional Information: Pt just called back missed Nurse Jin first call. Thanks

## 2022-09-02 RX ORDER — ZOLPIDEM TARTRATE 10 MG/1
TABLET ORAL
Qty: 30 TABLET | Refills: 5 | OUTPATIENT
Start: 2022-09-02

## 2022-09-02 RX ORDER — HYDROCODONE BITARTRATE AND ACETAMINOPHEN 5; 325 MG/1; MG/1
1 TABLET ORAL EVERY 6 HOURS PRN
COMMUNITY
End: 2022-09-05 | Stop reason: SDUPTHER

## 2022-09-02 NOTE — TELEPHONE ENCOUNTER
No new care gaps identified.  [facility] Embedded Care Gaps. Reference number: [id number]. 9/02/2022   2:32:31 PM CDT

## 2022-09-02 NOTE — TELEPHONE ENCOUNTER
----- Message from Diane Connor sent at 9/2/2022 12:22 PM CDT -----  Regarding: returning call  Contact: patient  Type:  Patient Returning Call    Who Called:  patient  Who Left Message for Patient:  notsure  Does the patient know what this is regarding?:  no  Best Call Back Number: [phone] (home)

## 2022-09-05 RX ORDER — HYDROCODONE BITARTRATE AND ACETAMINOPHEN 5; 325 MG/1; MG/1
1 TABLET ORAL EVERY 6 HOURS PRN
Qty: 60 TABLET | Refills: 0 | Status: SHIPPED | OUTPATIENT
Start: 2022-09-05 | End: 2022-09-09 | Stop reason: SDUPTHER

## 2022-09-05 RX ORDER — ZOLPIDEM TARTRATE 10 MG/1
TABLET ORAL
Qty: 30 TABLET | Refills: 5 | OUTPATIENT
Start: 2022-09-05

## 2022-09-05 RX ORDER — HYDROCODONE BITARTRATE AND ACETAMINOPHEN 5; 325 MG/1; MG/1
1 TABLET ORAL EVERY 6 HOURS PRN
OUTPATIENT
Start: 2022-09-05

## 2022-09-09 ENCOUNTER — OFFICE VISIT (OUTPATIENT)
Dept: FAMILY MEDICINE | Facility: CLINIC | Age: 77
End: 2022-09-09
Payer: MEDICARE

## 2022-09-09 VITALS
WEIGHT: 114.63 LBS | TEMPERATURE: 98 F | HEART RATE: 57 BPM | DIASTOLIC BLOOD PRESSURE: 60 MMHG | HEIGHT: 62 IN | RESPIRATION RATE: 18 BRPM | SYSTOLIC BLOOD PRESSURE: 110 MMHG | BODY MASS INDEX: 21.1 KG/M2 | OXYGEN SATURATION: 99 %

## 2022-09-09 DIAGNOSIS — M51.36 DDD (DEGENERATIVE DISC DISEASE), LUMBAR: ICD-10-CM

## 2022-09-09 DIAGNOSIS — E78.5 HYPERLIPIDEMIA, UNSPECIFIED HYPERLIPIDEMIA TYPE: ICD-10-CM

## 2022-09-09 DIAGNOSIS — I10 ESSENTIAL HYPERTENSION: Primary | ICD-10-CM

## 2022-09-09 DIAGNOSIS — G47.00 INSOMNIA, UNSPECIFIED TYPE: ICD-10-CM

## 2022-09-09 DIAGNOSIS — M50.30 DDD (DEGENERATIVE DISC DISEASE), CERVICAL: ICD-10-CM

## 2022-09-09 PROCEDURE — 3288F FALL RISK ASSESSMENT DOCD: CPT | Mod: CPTII,S$GLB,, | Performed by: FAMILY MEDICINE

## 2022-09-09 PROCEDURE — 1159F PR MEDICATION LIST DOCUMENTED IN MEDICAL RECORD: ICD-10-PCS | Mod: CPTII,S$GLB,, | Performed by: FAMILY MEDICINE

## 2022-09-09 PROCEDURE — 3074F PR MOST RECENT SYSTOLIC BLOOD PRESSURE < 130 MM HG: ICD-10-PCS | Mod: CPTII,S$GLB,, | Performed by: FAMILY MEDICINE

## 2022-09-09 PROCEDURE — 99214 PR OFFICE/OUTPT VISIT, EST, LEVL IV, 30-39 MIN: ICD-10-PCS | Mod: S$GLB,,, | Performed by: FAMILY MEDICINE

## 2022-09-09 PROCEDURE — 1101F PR PT FALLS ASSESS DOC 0-1 FALLS W/OUT INJ PAST YR: ICD-10-PCS | Mod: CPTII,S$GLB,, | Performed by: FAMILY MEDICINE

## 2022-09-09 PROCEDURE — 3074F SYST BP LT 130 MM HG: CPT | Mod: CPTII,S$GLB,, | Performed by: FAMILY MEDICINE

## 2022-09-09 PROCEDURE — 99214 OFFICE O/P EST MOD 30 MIN: CPT | Mod: S$GLB,,, | Performed by: FAMILY MEDICINE

## 2022-09-09 PROCEDURE — 3288F PR FALLS RISK ASSESSMENT DOCUMENTED: ICD-10-PCS | Mod: CPTII,S$GLB,, | Performed by: FAMILY MEDICINE

## 2022-09-09 PROCEDURE — 1101F PT FALLS ASSESS-DOCD LE1/YR: CPT | Mod: CPTII,S$GLB,, | Performed by: FAMILY MEDICINE

## 2022-09-09 PROCEDURE — 1126F AMNT PAIN NOTED NONE PRSNT: CPT | Mod: CPTII,S$GLB,, | Performed by: FAMILY MEDICINE

## 2022-09-09 PROCEDURE — 3078F DIAST BP <80 MM HG: CPT | Mod: CPTII,S$GLB,, | Performed by: FAMILY MEDICINE

## 2022-09-09 PROCEDURE — 1159F MED LIST DOCD IN RCRD: CPT | Mod: CPTII,S$GLB,, | Performed by: FAMILY MEDICINE

## 2022-09-09 PROCEDURE — 99999 PR PBB SHADOW E&M-EST. PATIENT-LVL IV: ICD-10-PCS | Mod: PBBFAC,,, | Performed by: FAMILY MEDICINE

## 2022-09-09 PROCEDURE — 99999 PR PBB SHADOW E&M-EST. PATIENT-LVL IV: CPT | Mod: PBBFAC,,, | Performed by: FAMILY MEDICINE

## 2022-09-09 PROCEDURE — 1126F PR PAIN SEVERITY QUANTIFIED, NO PAIN PRESENT: ICD-10-PCS | Mod: CPTII,S$GLB,, | Performed by: FAMILY MEDICINE

## 2022-09-09 PROCEDURE — 3078F PR MOST RECENT DIASTOLIC BLOOD PRESSURE < 80 MM HG: ICD-10-PCS | Mod: CPTII,S$GLB,, | Performed by: FAMILY MEDICINE

## 2022-09-09 RX ORDER — HYDROCODONE BITARTRATE AND ACETAMINOPHEN 5; 325 MG/1; MG/1
1 TABLET ORAL EVERY 6 HOURS PRN
Qty: 60 TABLET | Refills: 0 | Status: SHIPPED | OUTPATIENT
Start: 2022-11-08 | End: 2022-12-08 | Stop reason: SDUPTHER

## 2022-09-09 RX ORDER — GABAPENTIN 600 MG/1
TABLET ORAL
Qty: 30 TABLET | Refills: 11 | Status: SHIPPED | OUTPATIENT
Start: 2022-09-09 | End: 2023-02-02 | Stop reason: SDUPTHER

## 2022-09-09 RX ORDER — HYDROCODONE BITARTRATE AND ACETAMINOPHEN 5; 325 MG/1; MG/1
1 TABLET ORAL EVERY 6 HOURS PRN
Qty: 60 TABLET | Refills: 0 | Status: SHIPPED | OUTPATIENT
Start: 2022-09-09 | End: 2022-10-09

## 2022-09-09 RX ORDER — HYDROCODONE BITARTRATE AND ACETAMINOPHEN 5; 325 MG/1; MG/1
1 TABLET ORAL EVERY 6 HOURS PRN
Qty: 60 TABLET | Refills: 0 | Status: SHIPPED | OUTPATIENT
Start: 2022-10-09 | End: 2022-11-08

## 2022-09-09 NOTE — PROGRESS NOTES
Subjective:       Patient ID: Luis Miguel Murcia is a 77 y.o. female.    Chief Complaint: Follow-up (3 month follow up)    Follow-up      Here for a f/u    htn is stable. No c/o cp or sob at rest or exertion. No headaches.      C/o chronic right lumbar radicular pain > 1 year. Ps: 2-3/10 while on norco. Reports mild relief from poppy L5-S1 in 7/5/2018. Has residual constant neuropathy from right calf to right foot which is worse at night.  Takes gabapentin 300 mg qhs for the neuropathy.      Also, has chronic neck pain > 1 year. Ps: 2-3/10 while on norco       Anxiety stable while on xanax.      Insomnia stable while on ambien.     Gets purpura on skin. Stable.      Atherosclerosis of aorta monitored and stable.      On pravastatin for hld. Stable.       Review of Systems      Review of Systems   Constitutional: Negative for fever and chills.   HENT: Negative for hearing loss and neck stiffness.    Eyes: Negative for redness and itching.   Respiratory: Negative for cough and choking.    Cardiovascular: Negative for chest pain and leg swelling.  Abdomen: Negative for abdominal pain and blood in stool.   Genitourinary: Negative for dysuria and flank pain.   Musculoskeletal: Negative for back pain and gait problem.   Neurological: Negative for light-headedness and headaches.   Hematological: Negative for adenopathy.   Psychiatric/Behavioral: Negative for behavioral problems.     Objective:      Physical Exam  Constitutional:       Appearance: She is well-developed.   HENT:      Head: Normocephalic and atraumatic.   Eyes:      Conjunctiva/sclera: Conjunctivae normal.      Pupils: Pupils are equal, round, and reactive to light.   Cardiovascular:      Rate and Rhythm: Normal rate and regular rhythm.      Heart sounds: No murmur heard.  Pulmonary:      Effort: Pulmonary effort is normal.      Breath sounds: Normal breath sounds.   Musculoskeletal:      Cervical back: Normal range of motion.   Lymphadenopathy:       Cervical: No cervical adenopathy.       Assessment:       1. Essential hypertension    2. DDD (degenerative disc disease), lumbar    3. DDD (degenerative disc disease), cervical    4. Insomnia, unspecified type    5. Hyperlipidemia, unspecified hyperlipidemia type        Plan:       Essential hypertension    DDD (degenerative disc disease), lumbar    DDD (degenerative disc disease), cervical    Insomnia, unspecified type    Hyperlipidemia, unspecified hyperlipidemia type    Other orders  -     HYDROcodone-acetaminophen (NORCO) 5-325 mg per tablet; Take 1 tablet by mouth every 6 (six) hours as needed for Pain.  Dispense: 60 tablet; Refill: 0  -     HYDROcodone-acetaminophen (NORCO) 5-325 mg per tablet; Take 1 tablet by mouth every 6 (six) hours as needed for Pain.  Dispense: 60 tablet; Refill: 0  -     HYDROcodone-acetaminophen (NORCO) 5-325 mg per tablet; Take 1 tablet by mouth every 6 (six) hours as needed for Pain.  Dispense: 60 tablet; Refill: 0  -     gabapentin (NEURONTIN) 600 MG tablet; TAKE 1 TABLET(600 MG) BY MOUTH EVERY EVENING Strength: 600 mg  Dispense: 30 tablet; Refill: 11            Plan:  Cont current meds  Rf norco and neurontin  F/u with me in 3 months    Medication List with Changes/Refills   New Medications    HYDROCODONE-ACETAMINOPHEN (NORCO) 5-325 MG PER TABLET    Take 1 tablet by mouth every 6 (six) hours as needed for Pain.    HYDROCODONE-ACETAMINOPHEN (NORCO) 5-325 MG PER TABLET    Take 1 tablet by mouth every 6 (six) hours as needed for Pain.   Current Medications    ALBUTEROL (PROVENTIL/VENTOLIN HFA) 90 MCG/ACTUATION INHALER    INHALE 2 PUFFS BY MOUTH EVERY 6 HOURS AS NEEDED FOR WHEEZING    ALPRAZOLAM (XANAX) 0.25 MG TABLET    TAKE 1 TABLET(0.25 MG) BY MOUTH TWICE DAILY AS NEEDED FOR ANXIETY    AMLODIPINE (NORVASC) 5 MG TABLET    Take 1 tablet (5 mg total) by mouth once daily.    ASCORBATE CALCIUM (VITAMIN C ORAL)    Take 1,000 mg by mouth once daily.    ASPIRIN (ECOTRIN) 81 MG EC TABLET     Take 81 mg by mouth once daily.    AZELASTINE (ASTELIN) 137 MCG (0.1 %) NASAL SPRAY    2 sprays (274 mcg total) by Nasal route 2 (two) times daily.    B COMPLEX VITAMINS TABLET    Take 1 tablet by mouth once daily.    CALCIUM CITRATE-VITAMIN D3 ORAL        CARVEDILOL (COREG) 12.5 MG TABLET    TAKE 1 TABLET(12.5 MG) BY MOUTH TWICE DAILY WITH MEALS    CLINDAMYCIN (CLEOCIN) 300 MG CAPSULE    Take 300 mg by mouth 2 (two) times a day.    CO-ENZYME Q-10 30 MG CAPSULE        DEXLANSOPRAZOLE (DEXILANT) 60 MG CAPSULE    TAKE 1 CAPSULE(60 MG) BY MOUTH EVERY MORNING BEFORE BREAKFAST    FAMOTIDINE (PEPCID) 20 MG TABLET    TAKE 1 TABLET BY MOUTH TWICE DAILY    FLUTICASONE PROPIONATE (FLONASE) 50 MCG/ACTUATION NASAL SPRAY    2 sprays (100 mcg total) by Each Nostril route once daily.    IBANDRONATE (BONIVA) 150 MG TABLET    TAKE 1 TABLET(150 MG) BY MOUTH EVERY 30 DAYS    ONDANSETRON (ZOFRAN-ODT) 4 MG TBDL    DIS 1 T UNT Q 4 H PRN N    PRAVASTATIN (PRAVACHOL) 20 MG TABLET    Take 1 tablet (20 mg total) by mouth every evening.    VITAMIN B-12 1000 MCG TABLET    Take 1,000 mcg by mouth once daily.     ZOLPIDEM (AMBIEN) 10 MG TAB    TAKE 1 TABLET BY MOUTH EVERY EVENING   Changed and/or Refilled Medications    Modified Medication Previous Medication    GABAPENTIN (NEURONTIN) 600 MG TABLET gabapentin (NEURONTIN) 600 MG tablet       TAKE 1 TABLET(600 MG) BY MOUTH EVERY EVENING Strength: 600 mg    TAKE 1 TABLET(600 MG) BY MOUTH EVERY EVENING    HYDROCODONE-ACETAMINOPHEN (NORCO) 5-325 MG PER TABLET HYDROcodone-acetaminophen (NORCO) 5-325 mg per tablet       Take 1 tablet by mouth every 6 (six) hours as needed for Pain.    Take 1 tablet by mouth every 6 (six) hours as needed.

## 2022-09-30 ENCOUNTER — TELEPHONE (OUTPATIENT)
Dept: FAMILY MEDICINE | Facility: CLINIC | Age: 77
End: 2022-09-30
Payer: MEDICARE

## 2022-09-30 NOTE — TELEPHONE ENCOUNTER
----- Message from Davion Graham sent at 9/30/2022  1:45 PM CDT -----  Type: Needs Medical Advice  Who Called:  patient  Symptoms (please be specific):  patient is sick  How long has patient had these symptoms:    Pharmacy name and phone #:    [id number] - [place] - [address] AT [facility] [address]  [address] [place] [address]  Phone: [phone] Fax: [phone]     Best Call Back Number: [phone]   Additional Information: patient is asking for Leia to call her back

## 2022-09-30 NOTE — TELEPHONE ENCOUNTER
LOV 09/29/2022    Pt is requesting something be called in.  [place]     Advised she may need to be tested to rule out flu and covid.     Informed  is not in clinic today. She will need to be evaluated     Pt is wanting message sent to  because he will call her something in    Informed he will not be back until Monday.. Pt acknowledged that

## 2022-11-16 ENCOUNTER — TELEPHONE (OUTPATIENT)
Dept: FAMILY MEDICINE | Facility: CLINIC | Age: 77
End: 2022-11-16
Payer: MEDICARE

## 2022-11-16 DIAGNOSIS — F41.9 ANXIETY: Primary | ICD-10-CM

## 2022-11-16 NOTE — TELEPHONE ENCOUNTER
----- Message from Nora Adame sent at 11/16/2022 11:33 AM CST -----  Type: Needs Medical Advice         Who Called: pt  Best Call Back Number:[phone]  Additional Information: Requesting a call back regarding pt is asking for you to call her. Pt said there are new rules in her apt that require her 5lb dog to be a service dog or she can not have him. Pt is asking for office to write a letter stating pt needs the dog.   Please Advise- Thank you

## 2022-11-16 NOTE — TELEPHONE ENCOUNTER
----- Message from Sabra Gamboa sent at 11/15/2022  1:27 PM CST -----  Contact: pt at [phone]  Type: Needs Medical Advice  Who Called:  pt    Best Call Back Number: [phone]    Additional Information: pt is calling the office requesting a call back from Leia,. She states she need something from her doctor faxed to her apartments that her dog is a service dog. Please call back and advise.

## 2022-11-16 NOTE — TELEPHONE ENCOUNTER
Advised pt we aren't able to give her a letter stating her dog is a service animal but advised of online sources that can help her set up her dog as an emotional support animal. She request message be sent to provider to see if we can provide a letter about her anxiety and her dog being support for that    If letter is provided please fax to [phone]

## 2022-11-18 NOTE — TELEPHONE ENCOUNTER
inform pt via phone:    please advise patient that I will refer her to behavioral health for an evaluation for the need of a service dog. I did place the referral.     She can also try online sources that can help her set up her dog as an emotional support animal.

## 2022-11-22 ENCOUNTER — PATIENT OUTREACH (OUTPATIENT)
Dept: ADMINISTRATIVE | Facility: HOSPITAL | Age: 77
End: 2022-11-22
Payer: MEDICARE

## 2022-11-22 NOTE — PROGRESS NOTES
2022 Care Everywhere updates requested and reviewed.  Immunizations reconciled. Media reports reviewed.  Duplicate HM overrides and  orders removed.  Overdue HM topic chart audit and/or requested.  Overdue lab testing linked to upcoming lab appointments if applies.    Health Maintenance Due   Topic Date Due    Complete Opioid Risk Tool  Never done    LDCT Lung Screen  Never done    DEXA Scan  2022

## 2022-12-08 ENCOUNTER — OFFICE VISIT (OUTPATIENT)
Dept: FAMILY MEDICINE | Facility: CLINIC | Age: 77
End: 2022-12-08
Payer: MEDICARE

## 2022-12-08 VITALS
OXYGEN SATURATION: 98 % | RESPIRATION RATE: 18 BRPM | HEIGHT: 62 IN | WEIGHT: 112.19 LBS | TEMPERATURE: 98 F | HEART RATE: 55 BPM | SYSTOLIC BLOOD PRESSURE: 130 MMHG | DIASTOLIC BLOOD PRESSURE: 80 MMHG | BODY MASS INDEX: 20.65 KG/M2

## 2022-12-08 DIAGNOSIS — D69.2 SENILE PURPURA: ICD-10-CM

## 2022-12-08 DIAGNOSIS — Z78.0 MENOPAUSE: ICD-10-CM

## 2022-12-08 DIAGNOSIS — I70.0 ATHEROSCLEROSIS OF AORTA: ICD-10-CM

## 2022-12-08 DIAGNOSIS — E78.5 HYPERLIPIDEMIA, UNSPECIFIED HYPERLIPIDEMIA TYPE: ICD-10-CM

## 2022-12-08 DIAGNOSIS — Z87.891 PERSONAL HISTORY OF NICOTINE DEPENDENCE: ICD-10-CM

## 2022-12-08 DIAGNOSIS — M51.36 DDD (DEGENERATIVE DISC DISEASE), LUMBAR: ICD-10-CM

## 2022-12-08 DIAGNOSIS — M50.30 DDD (DEGENERATIVE DISC DISEASE), CERVICAL: ICD-10-CM

## 2022-12-08 DIAGNOSIS — G47.00 INSOMNIA, UNSPECIFIED TYPE: ICD-10-CM

## 2022-12-08 DIAGNOSIS — I10 ESSENTIAL HYPERTENSION: Primary | ICD-10-CM

## 2022-12-08 PROCEDURE — 1160F RVW MEDS BY RX/DR IN RCRD: CPT | Mod: CPTII,S$GLB,, | Performed by: FAMILY MEDICINE

## 2022-12-08 PROCEDURE — 1101F PT FALLS ASSESS-DOCD LE1/YR: CPT | Mod: CPTII,S$GLB,, | Performed by: FAMILY MEDICINE

## 2022-12-08 PROCEDURE — 99999 PR PBB SHADOW E&M-EST. PATIENT-LVL V: CPT | Mod: PBBFAC,,, | Performed by: FAMILY MEDICINE

## 2022-12-08 PROCEDURE — 3075F SYST BP GE 130 - 139MM HG: CPT | Mod: CPTII,S$GLB,, | Performed by: FAMILY MEDICINE

## 2022-12-08 PROCEDURE — 1159F MED LIST DOCD IN RCRD: CPT | Mod: CPTII,S$GLB,, | Performed by: FAMILY MEDICINE

## 2022-12-08 PROCEDURE — 1101F PR PT FALLS ASSESS DOC 0-1 FALLS W/OUT INJ PAST YR: ICD-10-PCS | Mod: CPTII,S$GLB,, | Performed by: FAMILY MEDICINE

## 2022-12-08 PROCEDURE — 99214 PR OFFICE/OUTPT VISIT, EST, LEVL IV, 30-39 MIN: ICD-10-PCS | Mod: S$GLB,,, | Performed by: FAMILY MEDICINE

## 2022-12-08 PROCEDURE — 1159F PR MEDICATION LIST DOCUMENTED IN MEDICAL RECORD: ICD-10-PCS | Mod: CPTII,S$GLB,, | Performed by: FAMILY MEDICINE

## 2022-12-08 PROCEDURE — 1160F PR REVIEW ALL MEDS BY PRESCRIBER/CLIN PHARMACIST DOCUMENTED: ICD-10-PCS | Mod: CPTII,S$GLB,, | Performed by: FAMILY MEDICINE

## 2022-12-08 PROCEDURE — 99999 PR PBB SHADOW E&M-EST. PATIENT-LVL V: ICD-10-PCS | Mod: PBBFAC,,, | Performed by: FAMILY MEDICINE

## 2022-12-08 PROCEDURE — 3288F FALL RISK ASSESSMENT DOCD: CPT | Mod: CPTII,S$GLB,, | Performed by: FAMILY MEDICINE

## 2022-12-08 PROCEDURE — 1126F PR PAIN SEVERITY QUANTIFIED, NO PAIN PRESENT: ICD-10-PCS | Mod: CPTII,S$GLB,, | Performed by: FAMILY MEDICINE

## 2022-12-08 PROCEDURE — 1126F AMNT PAIN NOTED NONE PRSNT: CPT | Mod: CPTII,S$GLB,, | Performed by: FAMILY MEDICINE

## 2022-12-08 PROCEDURE — 3079F DIAST BP 80-89 MM HG: CPT | Mod: CPTII,S$GLB,, | Performed by: FAMILY MEDICINE

## 2022-12-08 PROCEDURE — 99214 OFFICE O/P EST MOD 30 MIN: CPT | Mod: S$GLB,,, | Performed by: FAMILY MEDICINE

## 2022-12-08 PROCEDURE — 3075F PR MOST RECENT SYSTOLIC BLOOD PRESS GE 130-139MM HG: ICD-10-PCS | Mod: CPTII,S$GLB,, | Performed by: FAMILY MEDICINE

## 2022-12-08 PROCEDURE — 3288F PR FALLS RISK ASSESSMENT DOCUMENTED: ICD-10-PCS | Mod: CPTII,S$GLB,, | Performed by: FAMILY MEDICINE

## 2022-12-08 PROCEDURE — 3079F PR MOST RECENT DIASTOLIC BLOOD PRESSURE 80-89 MM HG: ICD-10-PCS | Mod: CPTII,S$GLB,, | Performed by: FAMILY MEDICINE

## 2022-12-08 RX ORDER — ZOLPIDEM TARTRATE 10 MG/1
TABLET ORAL
Qty: 30 TABLET | Refills: 2 | Status: SHIPPED | OUTPATIENT
Start: 2022-12-08 | End: 2022-12-28

## 2022-12-08 RX ORDER — HYDROCODONE BITARTRATE AND ACETAMINOPHEN 5; 325 MG/1; MG/1
1 TABLET ORAL EVERY 6 HOURS PRN
Qty: 60 TABLET | Refills: 0 | Status: SHIPPED | OUTPATIENT
Start: 2022-12-23 | End: 2023-01-22

## 2022-12-08 RX ORDER — HYDROCODONE BITARTRATE AND ACETAMINOPHEN 5; 325 MG/1; MG/1
1 TABLET ORAL EVERY 6 HOURS PRN
Qty: 60 TABLET | Refills: 0 | Status: SHIPPED | OUTPATIENT
Start: 2023-02-21 | End: 2023-03-09 | Stop reason: SDUPTHER

## 2022-12-08 RX ORDER — HYDROCODONE BITARTRATE AND ACETAMINOPHEN 5; 325 MG/1; MG/1
1 TABLET ORAL EVERY 6 HOURS PRN
Qty: 60 TABLET | Refills: 0 | Status: SHIPPED | OUTPATIENT
Start: 2023-01-22 | End: 2023-03-09

## 2022-12-08 NOTE — PROGRESS NOTES
Subjective:       Patient ID: Luis Miguel Murcia is a 77 y.o. female.    Chief Complaint: Follow-up (3 month follow up )    HPI    Here for a f/u     htn is stable. No c/o cp or sob at rest or exertion. No headaches.      C/o chronic right lumbar radicular pain > 1 year. Ps: 2-3/10 while on norco. Reports mild relief from poppy L5-S1 in 7/5/2018. Has residual constant neuropathy from right calf to right foot which is worse at night.  Takes gabapentin 300 mg qhs for the neuropathy.      Also, has chronic neck pain > 1 year. Ps: 2-3/10 while on norco       Anxiety stable while on xanax.      Insomnia stable while on ambien.     Gets purpura on skin. Stable.      Atherosclerosis of aorta monitored and stable.      On pravastatin for hld. Stable.         Review of Systems      Review of Systems   Constitutional: Negative for fever and chills.   HENT: Negative for hearing loss and neck stiffness.    Eyes: Negative for redness and itching.   Respiratory: Negative for cough and choking.    Cardiovascular: Negative for chest pain and leg swelling.  Abdomen: Negative for abdominal pain and blood in stool.   Genitourinary: Negative for dysuria and flank pain.   Neurological: Negative for light-headedness and headaches.   Hematological: Negative for adenopathy.   Psychiatric/Behavioral: Negative for behavioral problems.     Objective:      Physical Exam  Constitutional:       Appearance: She is well-developed.   HENT:      Head: Normocephalic and atraumatic.   Eyes:      Conjunctiva/sclera: Conjunctivae normal.      Pupils: Pupils are equal, round, and reactive to light.   Cardiovascular:      Rate and Rhythm: Normal rate and regular rhythm.      Heart sounds: No murmur heard.  Pulmonary:      Effort: Pulmonary effort is normal.      Breath sounds: Normal breath sounds.   Musculoskeletal:      Cervical back: Normal range of motion.   Lymphadenopathy:      Cervical: No cervical adenopathy.       Assessment:       1.  Essential hypertension    2. Menopause    3. Personal history of nicotine dependence    4. Hyperlipidemia, unspecified hyperlipidemia type    5. DDD (degenerative disc disease), lumbar    6. DDD (degenerative disc disease), cervical    7. Insomnia, unspecified type    8. Senile purpura    9. Atherosclerosis of aorta        Plan:       Essential hypertension    Menopause  -     DXA Bone Density Spine And Hip; Future    Personal history of nicotine dependence  -     CT Chest Lung Screening Low Dose; Future; Expected date: 12/08/2022    Hyperlipidemia, unspecified hyperlipidemia type  -     Comprehensive Metabolic Panel; Future  -     Lipid Panel; Future    DDD (degenerative disc disease), lumbar    DDD (degenerative disc disease), cervical    Insomnia, unspecified type    Senile purpura    Atherosclerosis of aorta    Other orders  -     zolpidem (AMBIEN) 10 mg Tab; TAKE 1 TABLET BY MOUTH EVERY EVENING  Dispense: 30 tablet; Refill: 2  -     HYDROcodone-acetaminophen (NORCO) 5-325 mg per tablet; Take 1 tablet by mouth every 6 (six) hours as needed for Pain.  Dispense: 60 tablet; Refill: 0  -     HYDROcodone-acetaminophen (NORCO) 5-325 mg per tablet; Take 1 tablet by mouth every 6 (six) hours as needed for Pain.  Dispense: 60 tablet; Refill: 0  -     HYDROcodone-acetaminophen (NORCO) 5-325 mg per tablet; Take 1 tablet by mouth every 6 (six) hours as needed for Pain.  Dispense: 60 tablet; Refill: 0                  Plan:  Rf norco  Cont current meds  See orders above  F/u in 3 months    Medication List with Changes/Refills   New Medications    HYDROCODONE-ACETAMINOPHEN (NORCO) 5-325 MG PER TABLET    Take 1 tablet by mouth every 6 (six) hours as needed for Pain.    HYDROCODONE-ACETAMINOPHEN (NORCO) 5-325 MG PER TABLET    Take 1 tablet by mouth every 6 (six) hours as needed for Pain.   Current Medications    ALBUTEROL (PROVENTIL/VENTOLIN HFA) 90 MCG/ACTUATION INHALER    INHALE 2 PUFFS BY MOUTH EVERY 6 HOURS AS NEEDED FOR  WHEEZING    ALPRAZOLAM (XANAX) 0.25 MG TABLET    TAKE 1 TABLET(0.25 MG) BY MOUTH TWICE DAILY AS NEEDED FOR ANXIETY    AMLODIPINE (NORVASC) 5 MG TABLET    Take 1 tablet (5 mg total) by mouth once daily.    ASCORBATE CALCIUM (VITAMIN C ORAL)    Take 1,000 mg by mouth once daily.    ASPIRIN (ECOTRIN) 81 MG EC TABLET    Take 81 mg by mouth once daily.    AZELASTINE (ASTELIN) 137 MCG (0.1 %) NASAL SPRAY    2 sprays (274 mcg total) by Nasal route 2 (two) times daily.    B COMPLEX VITAMINS TABLET    Take 1 tablet by mouth once daily.    CALCIUM CITRATE-VITAMIN D3 ORAL        CARVEDILOL (COREG) 12.5 MG TABLET    TAKE 1 TABLET(12.5 MG) BY MOUTH TWICE DAILY WITH MEALS    CLINDAMYCIN (CLEOCIN) 300 MG CAPSULE    Take 300 mg by mouth 2 (two) times a day.    CO-ENZYME Q-10 30 MG CAPSULE        DEXLANSOPRAZOLE (DEXILANT) 60 MG CAPSULE    TAKE 1 CAPSULE(60 MG) BY MOUTH EVERY MORNING BEFORE BREAKFAST    FAMOTIDINE (PEPCID) 20 MG TABLET    TAKE 1 TABLET BY MOUTH TWICE DAILY    FLUTICASONE PROPIONATE (FLONASE) 50 MCG/ACTUATION NASAL SPRAY    2 sprays (100 mcg total) by Each Nostril route once daily.    GABAPENTIN (NEURONTIN) 600 MG TABLET    TAKE 1 TABLET(600 MG) BY MOUTH EVERY EVENING Strength: 600 mg    IBANDRONATE (BONIVA) 150 MG TABLET    TAKE 1 TABLET(150 MG) BY MOUTH EVERY 30 DAYS    ONDANSETRON (ZOFRAN-ODT) 4 MG TBDL    DIS 1 T UNT Q 4 H PRN N    PRAVASTATIN (PRAVACHOL) 20 MG TABLET    TAKE 1 TABLET(20 MG) BY MOUTH EVERY EVENING    VITAMIN B-12 1000 MCG TABLET    Take 1,000 mcg by mouth once daily.    Changed and/or Refilled Medications    Modified Medication Previous Medication    HYDROCODONE-ACETAMINOPHEN (NORCO) 5-325 MG PER TABLET HYDROcodone-acetaminophen (NORCO) 5-325 mg per tablet       Take 1 tablet by mouth every 6 (six) hours as needed for Pain.    Take 1 tablet by mouth every 6 (six) hours as needed for Pain.    ZOLPIDEM (AMBIEN) 10 MG TAB zolpidem (AMBIEN) 10 mg Tab       TAKE 1 TABLET BY MOUTH EVERY EVENING    TAKE  1 TABLET BY MOUTH EVERY EVENING

## 2022-12-26 DIAGNOSIS — F41.9 ANXIETY: ICD-10-CM

## 2022-12-26 DIAGNOSIS — G47.00 INSOMNIA, UNSPECIFIED TYPE: Primary | ICD-10-CM

## 2022-12-26 RX ORDER — ALBUTEROL SULFATE 90 UG/1
AEROSOL, METERED RESPIRATORY (INHALATION)
Qty: 54 G | Refills: 3 | Status: SHIPPED | OUTPATIENT
Start: 2022-12-26

## 2022-12-27 NOTE — TELEPHONE ENCOUNTER
No new care gaps identified.  [facility] Embedded Care Gaps. Reference number: [id number]. 12/26/2022   8:47:32 PM CST

## 2022-12-27 NOTE — TELEPHONE ENCOUNTER
Refill Routing Note   Medication(s) are not appropriate for processing by [facility] for the following reason(s):      - Outside of protocol    [facility] action(s):  Route  Approve          Medication reconciliation completed: No     Appointments  past 12m or future 3m with PCP    Date Provider   Last Visit   12/8/2022 Juan Francisco Jeong MD   Next Visit   Visit date not found Juan Francisco Jeong MD   ED visits in past 90 days: 0        Note composed:9:33 PM 12/26/2022

## 2022-12-28 RX ORDER — ZOLPIDEM TARTRATE 10 MG/1
TABLET ORAL
Qty: 30 TABLET | Refills: 0 | Status: SHIPPED | OUTPATIENT
Start: 2022-12-28 | End: 2023-02-01

## 2022-12-28 RX ORDER — ALPRAZOLAM 0.25 MG/1
TABLET ORAL
Qty: 60 TABLET | Refills: 0 | Status: SHIPPED | OUTPATIENT
Start: 2022-12-28 | End: 2023-02-01

## 2023-01-23 NOTE — TELEPHONE ENCOUNTER
----- Message from Iraida Michele sent at 1/23/2023 11:09 AM CST -----  Regarding: Rx Refill  Who Called:JUAN FRANCISCO CHOWDHURY [[id number]]     Refill or New Rx.:Refill     pravastatin (PRAVACHOL) 20 MG tablet      Preferred Pharmacy with phone number:  [facility] #[id number] - [place] [address] & [address] [address]  Phone: [phone] Fax: [phone]         Best Call Back Number:[phone]       Additional Information:

## 2023-01-23 NOTE — TELEPHONE ENCOUNTER
Care Due:                  Date            Visit Type   Department     Provider  --------------------------------------------------------------------------------                                EP -                              PRIMARY      [facility]  Last Visit: 12-      CARE ([facility])   MEDICINE       Juan Francisco ANDERSON  Next Visit: None Scheduled  None         None Found                                                            Last  Test          Frequency    Reason                     Performed    Due Date  --------------------------------------------------------------------------------    CMP.........  12 months..  pravastatin..............  01- 01-    Lipid Panel.  12 months..  pravastatin..............  01- 01-    [facility] Embedded Care Gaps. Reference number: [id number]. 1/23/2023   3:56:57 PM CST

## 2023-01-26 RX ORDER — PRAVASTATIN SODIUM 20 MG/1
20 TABLET ORAL NIGHTLY
Qty: 90 TABLET | Refills: 0 | Status: SHIPPED | OUTPATIENT
Start: 2023-01-26 | End: 2023-05-07

## 2023-02-02 DIAGNOSIS — I10 ESSENTIAL HYPERTENSION: ICD-10-CM

## 2023-02-02 NOTE — TELEPHONE ENCOUNTER
----- Message from Mikel Claudio sent at 2/2/2023  2:32 PM CST -----  Contact: pt  Type:  RX Refill Request    Who Called:  pt   Refill or New Rx:  refill  RX Name and Strength:  amLODIPine (NORVASC) 5 MG tablet and gabapentin (NEURONTIN) 600 MG tablet and carvediloL (COREG) 12.5 MG tablet  How is the patient currently taking it? (ex. 1XDay):  twice a day   Is this a 30 day or 90 day RX:  90  Preferred Pharmacy with phone number:    [facility] #[id number] - [place] [address] [address] [facility] [address] [place] [address] [address] [place] [address]  Phone: [phone] Fax: [phone]        Local or Mail Order:  local  Ordering Provider:  Dr. Jean-Paul Bowie Call Back Number:  [phone]    Additional Information:  pt needs a refill. Please advise.

## 2023-02-02 NOTE — TELEPHONE ENCOUNTER
No new care gaps identified.  [facility] Embedded Care Gaps. Reference number: [id number]. 2/02/2023   2:41:22 PM CST

## 2023-02-07 RX ORDER — AMLODIPINE BESYLATE 5 MG/1
5 TABLET ORAL DAILY
Qty: 90 TABLET | Refills: 3 | Status: SHIPPED | OUTPATIENT
Start: 2023-02-07 | End: 2024-01-24 | Stop reason: SDUPTHER

## 2023-02-07 RX ORDER — CARVEDILOL 12.5 MG/1
12.5 TABLET ORAL 2 TIMES DAILY
Qty: 180 TABLET | Refills: 3 | Status: SHIPPED | OUTPATIENT
Start: 2023-02-07 | End: 2024-03-05 | Stop reason: SDUPTHER

## 2023-02-07 RX ORDER — GABAPENTIN 600 MG/1
TABLET ORAL
Qty: 30 TABLET | Refills: 11 | Status: SHIPPED | OUTPATIENT
Start: 2023-02-07 | End: 2023-04-17 | Stop reason: SDUPTHER

## 2023-02-15 ENCOUNTER — TELEPHONE (OUTPATIENT)
Dept: FAMILY MEDICINE | Facility: CLINIC | Age: 78
End: 2023-02-15
Payer: MEDICARE

## 2023-02-16 ENCOUNTER — TELEPHONE (OUTPATIENT)
Dept: FAMILY MEDICINE | Facility: CLINIC | Age: 78
End: 2023-02-16
Payer: MEDICARE

## 2023-02-16 NOTE — TELEPHONE ENCOUNTER
----- Message from Jacquelin Reinoso, Patient Care Assistant sent at 2/16/2023 12:23 PM CST -----  Regarding: appointment  Contact: pt  Type: Needs Medical Advice  Who Called:  pt     Best Call Back Number: [phone] (home)     Additional Information: pt states she would like a callback regarding cancelling her CT scan and lab appointments on today. Thanks!

## 2023-02-16 NOTE — TELEPHONE ENCOUNTER
----- Message from Louann Munoz sent at 2/16/2023 12:40 PM CST -----  Regarding: advise  Contact: Patient  Type: Needs Medical Advice  Who Called:  Patient  Symptoms (please be specific):    How long has patient had these symptoms:    Pharmacy name and phone #:    Best Call Back Number: [phone]     Additional Information: needs to reschedule her ct scan and bone density. Please call to advise. Thanks!

## 2023-02-20 ENCOUNTER — LAB VISIT (OUTPATIENT)
Dept: LAB | Facility: HOSPITAL | Age: 78
End: 2023-02-20
Attending: FAMILY MEDICINE
Payer: MEDICARE

## 2023-02-20 DIAGNOSIS — E78.5 HYPERLIPIDEMIA, UNSPECIFIED HYPERLIPIDEMIA TYPE: ICD-10-CM

## 2023-02-20 LAB
ALBUMIN SERPL BCP-MCNC: 4.1 G/DL (ref 3.5–5.2)
ALP SERPL-CCNC: 76 U/L (ref 55–135)
ALT SERPL W/O P-5'-P-CCNC: 11 U/L (ref 10–44)
ANION GAP SERPL CALC-SCNC: 9 MMOL/L (ref 8–16)
AST SERPL-CCNC: 23 U/L (ref 10–40)
BILIRUB SERPL-MCNC: 0.4 MG/DL (ref 0.1–1)
BUN SERPL-MCNC: 12 MG/DL (ref 8–23)
CALCIUM SERPL-MCNC: 9.7 MG/DL (ref 8.7–10.5)
CHLORIDE SERPL-SCNC: 98 MMOL/L (ref 95–110)
CHOLEST SERPL-MCNC: 162 MG/DL (ref 120–199)
CHOLEST/HDLC SERPL: 2.5 {RATIO} (ref 2–5)
CO2 SERPL-SCNC: 25 MMOL/L (ref 23–29)
CREAT SERPL-MCNC: 1 MG/DL (ref 0.5–1.4)
EST. GFR  (NO RACE VARIABLE): 58 ML/MIN/1.73 M^2
GLUCOSE SERPL-MCNC: 93 MG/DL (ref 70–110)
HDLC SERPL-MCNC: 64 MG/DL (ref 40–75)
HDLC SERPL: 39.5 % (ref 20–50)
LDLC SERPL CALC-MCNC: 83.2 MG/DL (ref 63–159)
NONHDLC SERPL-MCNC: 98 MG/DL
POTASSIUM SERPL-SCNC: 4.4 MMOL/L (ref 3.5–5.1)
PROT SERPL-MCNC: 7.5 G/DL (ref 6–8.4)
SODIUM SERPL-SCNC: 132 MMOL/L (ref 136–145)
TRIGL SERPL-MCNC: 74 MG/DL (ref 30–150)

## 2023-02-20 PROCEDURE — 80053 COMPREHEN METABOLIC PANEL: CPT | Mod: HCNC | Performed by: FAMILY MEDICINE

## 2023-02-20 PROCEDURE — 80061 LIPID PANEL: CPT | Mod: HCNC | Performed by: FAMILY MEDICINE

## 2023-02-20 PROCEDURE — 36415 COLL VENOUS BLD VENIPUNCTURE: CPT | Mod: HCNC,PO | Performed by: FAMILY MEDICINE

## 2023-02-24 ENCOUNTER — HOSPITAL ENCOUNTER (OUTPATIENT)
Dept: RADIOLOGY | Facility: HOSPITAL | Age: 78
Discharge: HOME | End: 2023-02-24
Attending: FAMILY MEDICINE
Payer: MEDICARE

## 2023-02-24 DIAGNOSIS — Z78.0 MENOPAUSE: ICD-10-CM

## 2023-02-24 DIAGNOSIS — Z87.891 PERSONAL HISTORY OF NICOTINE DEPENDENCE: ICD-10-CM

## 2023-02-24 PROCEDURE — 71271 CT THORAX LUNG CANCER SCR C-: CPT | Mod: TC,HCNC,PO

## 2023-02-24 PROCEDURE — 71271 CT CHEST LUNG SCREENING LOW DOSE: ICD-10-PCS | Mod: 26,HCNC,, | Performed by: RADIOLOGY

## 2023-02-24 PROCEDURE — 77080 DXA BONE DENSITY AXIAL: CPT | Mod: TC,HCNC,PO

## 2023-02-24 PROCEDURE — 71271 CT THORAX LUNG CANCER SCR C-: CPT | Mod: 26,HCNC,, | Performed by: RADIOLOGY

## 2023-02-24 PROCEDURE — 77080 DXA BONE DENSITY AXIAL: CPT | Mod: 26,HCNC,, | Performed by: RADIOLOGY

## 2023-02-24 PROCEDURE — 77080 DEXA BONE DENSITY SPINE HIP: ICD-10-PCS | Mod: 26,HCNC,, | Performed by: RADIOLOGY

## 2023-02-28 ENCOUNTER — TELEPHONE (OUTPATIENT)
Dept: HEMATOLOGY/ONCOLOGY | Facility: CLINIC | Age: 78
End: 2023-02-28
Payer: MEDICARE

## 2023-02-28 NOTE — TELEPHONE ENCOUNTER
----- Message from Belen Monte, RT sent at 2023  9:09 AM CST -----  RegardinA  Patient scored a 4A on lung screen

## 2023-02-28 NOTE — NURSING
Received LDCT results of 4A from radiology. Patient has follow up with her PCP on 3/9. Will follow up after that visit for either a pulmonary or diagnosis clinic referral.

## 2023-03-05 DIAGNOSIS — J32.9 SINUSITIS, UNSPECIFIED CHRONICITY, UNSPECIFIED LOCATION: ICD-10-CM

## 2023-03-05 RX ORDER — AZELASTINE 1 MG/ML
SPRAY, METERED NASAL
Qty: 90 ML | Refills: 3 | Status: SHIPPED | OUTPATIENT
Start: 2023-03-05

## 2023-03-06 NOTE — TELEPHONE ENCOUNTER
No new care gaps identified.  [facility] Embedded Care Gaps. Reference number: [id number]. 3/05/2023   6:30:52 PM CST

## 2023-03-06 NOTE — TELEPHONE ENCOUNTER
Refill Decision Note   Luis Miguel Murcia  is requesting a refill authorization.  Brief Assessment and Rationale for Refill:  Approve     Medication Therapy Plan:       Medication Reconciliation Completed: No   Comments:     No Care Gaps recommended.     Note composed:10:08 PM 03/05/2023

## 2023-03-09 ENCOUNTER — OFFICE VISIT (OUTPATIENT)
Dept: FAMILY MEDICINE | Facility: CLINIC | Age: 78
End: 2023-03-09
Payer: MEDICARE

## 2023-03-09 ENCOUNTER — TELEPHONE (OUTPATIENT)
Dept: HEMATOLOGY/ONCOLOGY | Facility: CLINIC | Age: 78
End: 2023-03-09
Payer: MEDICARE

## 2023-03-09 VITALS
BODY MASS INDEX: 20.4 KG/M2 | TEMPERATURE: 98 F | OXYGEN SATURATION: 96 % | DIASTOLIC BLOOD PRESSURE: 72 MMHG | WEIGHT: 110.88 LBS | HEART RATE: 61 BPM | HEIGHT: 62 IN | SYSTOLIC BLOOD PRESSURE: 124 MMHG

## 2023-03-09 DIAGNOSIS — M85.80 OSTEOPENIA, UNSPECIFIED LOCATION: ICD-10-CM

## 2023-03-09 DIAGNOSIS — G47.00 INSOMNIA, UNSPECIFIED TYPE: ICD-10-CM

## 2023-03-09 DIAGNOSIS — J44.9 CHRONIC OBSTRUCTIVE PULMONARY DISEASE, UNSPECIFIED COPD TYPE: ICD-10-CM

## 2023-03-09 DIAGNOSIS — N18.31 CHRONIC KIDNEY DISEASE, STAGE 3A: ICD-10-CM

## 2023-03-09 DIAGNOSIS — D69.2 SENILE PURPURA: ICD-10-CM

## 2023-03-09 DIAGNOSIS — I70.0 ATHEROSCLEROSIS OF AORTA: ICD-10-CM

## 2023-03-09 DIAGNOSIS — R91.1 LUNG NODULE SEEN ON IMAGING STUDY: Primary | ICD-10-CM

## 2023-03-09 PROCEDURE — 1101F PR PT FALLS ASSESS DOC 0-1 FALLS W/OUT INJ PAST YR: ICD-10-PCS | Mod: CPTII,S$GLB,, | Performed by: FAMILY MEDICINE

## 2023-03-09 PROCEDURE — 3078F PR MOST RECENT DIASTOLIC BLOOD PRESSURE < 80 MM HG: ICD-10-PCS | Mod: CPTII,S$GLB,, | Performed by: FAMILY MEDICINE

## 2023-03-09 PROCEDURE — 99499 RISK ADDL DX/OHS AUDIT: ICD-10-PCS | Mod: S$GLB,,, | Performed by: FAMILY MEDICINE

## 2023-03-09 PROCEDURE — 3288F FALL RISK ASSESSMENT DOCD: CPT | Mod: CPTII,S$GLB,, | Performed by: FAMILY MEDICINE

## 2023-03-09 PROCEDURE — 1101F PT FALLS ASSESS-DOCD LE1/YR: CPT | Mod: CPTII,S$GLB,, | Performed by: FAMILY MEDICINE

## 2023-03-09 PROCEDURE — 3074F PR MOST RECENT SYSTOLIC BLOOD PRESSURE < 130 MM HG: ICD-10-PCS | Mod: CPTII,S$GLB,, | Performed by: FAMILY MEDICINE

## 2023-03-09 PROCEDURE — 99214 PR OFFICE/OUTPT VISIT, EST, LEVL IV, 30-39 MIN: ICD-10-PCS | Mod: S$GLB,,, | Performed by: FAMILY MEDICINE

## 2023-03-09 PROCEDURE — 99499 UNLISTED E&M SERVICE: CPT | Mod: S$GLB,,, | Performed by: FAMILY MEDICINE

## 2023-03-09 PROCEDURE — 1126F AMNT PAIN NOTED NONE PRSNT: CPT | Mod: CPTII,S$GLB,, | Performed by: FAMILY MEDICINE

## 2023-03-09 PROCEDURE — 1159F MED LIST DOCD IN RCRD: CPT | Mod: CPTII,S$GLB,, | Performed by: FAMILY MEDICINE

## 2023-03-09 PROCEDURE — 3288F PR FALLS RISK ASSESSMENT DOCUMENTED: ICD-10-PCS | Mod: CPTII,S$GLB,, | Performed by: FAMILY MEDICINE

## 2023-03-09 PROCEDURE — 99999 PR PBB SHADOW E&M-EST. PATIENT-LVL V: CPT | Mod: PBBFAC,HCNC,, | Performed by: FAMILY MEDICINE

## 2023-03-09 PROCEDURE — 3078F DIAST BP <80 MM HG: CPT | Mod: CPTII,S$GLB,, | Performed by: FAMILY MEDICINE

## 2023-03-09 PROCEDURE — 1126F PR PAIN SEVERITY QUANTIFIED, NO PAIN PRESENT: ICD-10-PCS | Mod: CPTII,S$GLB,, | Performed by: FAMILY MEDICINE

## 2023-03-09 PROCEDURE — 1159F PR MEDICATION LIST DOCUMENTED IN MEDICAL RECORD: ICD-10-PCS | Mod: CPTII,S$GLB,, | Performed by: FAMILY MEDICINE

## 2023-03-09 PROCEDURE — 99999 PR PBB SHADOW E&M-EST. PATIENT-LVL V: ICD-10-PCS | Mod: PBBFAC,HCNC,, | Performed by: FAMILY MEDICINE

## 2023-03-09 PROCEDURE — 3074F SYST BP LT 130 MM HG: CPT | Mod: CPTII,S$GLB,, | Performed by: FAMILY MEDICINE

## 2023-03-09 PROCEDURE — 99214 OFFICE O/P EST MOD 30 MIN: CPT | Mod: S$GLB,,, | Performed by: FAMILY MEDICINE

## 2023-03-09 RX ORDER — ZOLPIDEM TARTRATE 10 MG/1
10 TABLET ORAL NIGHTLY
Qty: 30 TABLET | Refills: 5 | Status: SHIPPED | OUTPATIENT
Start: 2023-03-09 | End: 2023-09-27 | Stop reason: SDUPTHER

## 2023-03-09 RX ORDER — HYDROCODONE BITARTRATE AND ACETAMINOPHEN 5; 325 MG/1; MG/1
1 TABLET ORAL EVERY 6 HOURS PRN
Qty: 60 TABLET | Refills: 0 | Status: SHIPPED | OUTPATIENT
Start: 2023-04-03 | End: 2023-05-03

## 2023-03-09 RX ORDER — ZOLPIDEM TARTRATE 10 MG/1
10 TABLET ORAL NIGHTLY
Qty: 30 TABLET | Refills: 5 | Status: SHIPPED | OUTPATIENT
Start: 2023-03-09 | End: 2023-03-09 | Stop reason: SDUPTHER

## 2023-03-09 RX ORDER — HYDROCODONE BITARTRATE AND ACETAMINOPHEN 5; 325 MG/1; MG/1
1 TABLET ORAL EVERY 6 HOURS PRN
Qty: 60 TABLET | Refills: 0 | Status: SHIPPED | OUTPATIENT
Start: 2023-05-03 | End: 2023-06-02

## 2023-03-09 NOTE — NURSING
Reached out to patient to schedule visit in our diagnosis clinic per referral from Dr. Jeong. There was no answer and voicemail not set up.   Will try to call again.

## 2023-03-09 NOTE — PROGRESS NOTES
Subjective:       Patient ID: Luis Miguel Murcia is a 77 y.o. female.    Chief Complaint: Hypertension    HPI    Here for a f/u    Reviewed recent labs. Mildly low sodium. Lipids are normal.      Recent Bmd shows osteopenia-on boniva    Recent ct chest shows suspicious 4A 9 mm lung nodule within right lower lobe.      Copd-stable. No cough or wheeze. Ex-smoker.     htn is stable. No c/o cp or sob at rest or exertion. No headaches.      C/o chronic right lumbar radicular pain > 1 year. Ps: 2-3/10 while on norco. Reports mild relief from poppy L5-S1 in 7/5/2018. Has residual constant neuropathy from right calf to right foot which is worse at night.  Takes gabapentin 300 mg qhs for the neuropathy.      Also, has chronic neck pain > 1 year. Ps: 2-3/10 while on norco       Anxiety stable while on xanax.      Insomnia stable while on ambien.     Gets purpura on skin. Stable.     Ckd stage 3 monitored and stable.      Atherosclerosis of aorta monitored and stable.      On pravastatin for hld. Stable.          Review of Systems    Objective:      Physical Exam  Constitutional:       Appearance: She is well-developed.   HENT:      Head: Normocephalic and atraumatic.   Eyes:      Conjunctiva/sclera: Conjunctivae normal.      Pupils: Pupils are equal, round, and reactive to light.   Cardiovascular:      Rate and Rhythm: Normal rate and regular rhythm.      Heart sounds: No murmur heard.  Pulmonary:      Effort: Pulmonary effort is normal.      Breath sounds: Normal breath sounds.   Musculoskeletal:      Cervical back: Normal range of motion.   Lymphadenopathy:      Cervical: No cervical adenopathy.       Assessment:       1. Lung nodule seen on imaging study    2. Chronic kidney disease, stage 3a    3. Senile purpura    4. Atherosclerosis of aorta    5. Chronic obstructive pulmonary disease, unspecified COPD type    6. Insomnia, unspecified type    7. Osteopenia, unspecified location        Plan:       Lung nodule seen on  imaging study  -     Ambulatory Referral/Consult to Suspected Oncology Diagnosis Clinic; Future; Expected date: 03/16/2023    Chronic kidney disease, stage 3a    Senile purpura    Atherosclerosis of aorta    Chronic obstructive pulmonary disease, unspecified COPD type    Insomnia, unspecified type  -     Discontinue: zolpidem (AMBIEN) 10 mg Tab; Take 1 tablet (10 mg total) by mouth every evening.  Dispense: 30 tablet; Refill: 5  -     zolpidem (AMBIEN) 10 mg Tab; Take 1 tablet (10 mg total) by mouth every evening.  Dispense: 30 tablet; Refill: 5    Osteopenia, unspecified location    Other orders  -     HYDROcodone-acetaminophen (NORCO) 5-325 mg per tablet; Take 1 tablet by mouth every 6 (six) hours as needed for Pain.  Dispense: 60 tablet; Refill: 0  -     HYDROcodone-acetaminophen (NORCO) 5-325 mg per tablet; Take 1 tablet by mouth every 6 (six) hours as needed for Pain.  Dispense: 60 tablet; Refill: 0            Plan:  See referral  Cont current meds  F/u in 3 months        Medication List with Changes/Refills   New Medications    HYDROCODONE-ACETAMINOPHEN (NORCO) 5-325 MG PER TABLET    Take 1 tablet by mouth every 6 (six) hours as needed for Pain.   Current Medications    ALBUTEROL (PROVENTIL/VENTOLIN HFA) 90 MCG/ACTUATION INHALER    INHALE 2 PUFFS BY MOUTH EVERY 6 HOURS AS NEEDED FOR WHEEZING    ALPRAZOLAM (XANAX) 0.25 MG TABLET    TAKE 1 TABLET(0.25 MG) BY MOUTH TWICE DAILY AS NEEDED FOR ANXIETY    AMLODIPINE (NORVASC) 5 MG TABLET    Take 1 tablet (5 mg total) by mouth once daily.    ASCORBATE CALCIUM (VITAMIN C ORAL)    Take 1,000 mg by mouth once daily.    ASPIRIN (ECOTRIN) 81 MG EC TABLET    Take 81 mg by mouth once daily.    AZELASTINE (ASTELIN) 137 MCG (0.1 %) NASAL SPRAY    PLACE 2 SPRAYS IN EACH NOSTRIL TWICE DAILY    B COMPLEX VITAMINS TABLET    Take 1 tablet by mouth once daily.    CALCIUM CITRATE-VITAMIN D3 ORAL        CARVEDILOL (COREG) 12.5 MG TABLET    Take 1 tablet (12.5 mg total) by mouth 2  (two) times daily.    CO-ENZYME Q-10 30 MG CAPSULE        DEXLANSOPRAZOLE (DEXILANT) 60 MG CAPSULE    TAKE 1 CAPSULE(60 MG) BY MOUTH EVERY MORNING BEFORE BREAKFAST    FAMOTIDINE (PEPCID) 20 MG TABLET    TAKE 1 TABLET BY MOUTH TWICE DAILY    FLUTICASONE PROPIONATE (FLONASE) 50 MCG/ACTUATION NASAL SPRAY    2 sprays (100 mcg total) by Each Nostril route once daily.    GABAPENTIN (NEURONTIN) 600 MG TABLET    TAKE 1 TABLET(600 MG) BY MOUTH EVERY EVENING Strength: 600 mg    IBANDRONATE (BONIVA) 150 MG TABLET    TAKE 1 TABLET(150 MG) BY MOUTH EVERY 30 DAYS    ONDANSETRON (ZOFRAN-ODT) 4 MG TBDL    DIS 1 T UNT Q 4 H PRN N    PRAVASTATIN (PRAVACHOL) 20 MG TABLET    Take 1 tablet (20 mg total) by mouth every evening.    VITAMIN B-12 1000 MCG TABLET    Take 1,000 mcg by mouth once daily.    Changed and/or Refilled Medications    Modified Medication Previous Medication    HYDROCODONE-ACETAMINOPHEN (NORCO) 5-325 MG PER TABLET HYDROcodone-acetaminophen (NORCO) 5-325 mg per tablet       Take 1 tablet by mouth every 6 (six) hours as needed for Pain.    Take 1 tablet by mouth every 6 (six) hours as needed for Pain.    ZOLPIDEM (AMBIEN) 10 MG TAB zolpidem (AMBIEN) 10 mg Tab       Take 1 tablet (10 mg total) by mouth every evening.    TAKE 1 TABLET(10 MG) BY MOUTH EVERY EVENING   Discontinued Medications    CLINDAMYCIN (CLEOCIN) 300 MG CAPSULE    Take 300 mg by mouth 2 (two) times a day.    HYDROCODONE-ACETAMINOPHEN (NORCO) 5-325 MG PER TABLET    Take 1 tablet by mouth every 6 (six) hours as needed for Pain.

## 2023-03-10 ENCOUNTER — TELEPHONE (OUTPATIENT)
Dept: HEMATOLOGY/ONCOLOGY | Facility: CLINIC | Age: 78
End: 2023-03-10
Payer: MEDICARE

## 2023-03-10 NOTE — NURSING
Reached out to patient to schedule a visit in our diagnosis clinic per referral from Dr. Jeong for Lung RADS 4A. Patient accepted next available appt of Tuesday, March 21st at 1500.   Date, time, and location confirmed.     Oncology Navigation   Intake  Cancer Type: LDCT  Internal / External Referral: Internal  Date of Referral: 03/09/23  Initial Nurse Navigator Contact: 03/09/23  Referral to Initial Contact Timeline (days): 0  Date Worked: 03/10/23  First Appointment Available: 03/21/23  Appointment Date: 03/21/23  First Available Date vs. Scheduled Date (days): 0  Reason if booked > 7 days after scheduling: Specific provider / access     Treatment  Current Status: Staging work-up             Procedures: CT; Dexa  CT Schedule Date: 02/24/23  DEXA Schedule Date: 02/24/23                 Acuity      Follow Up  No follow-ups on file.

## 2023-03-21 ENCOUNTER — TUMOR BOARD CONFERENCE (OUTPATIENT)
Dept: HEMATOLOGY/ONCOLOGY | Facility: CLINIC | Age: 78
End: 2023-03-21
Payer: MEDICARE

## 2023-03-21 ENCOUNTER — TELEPHONE (OUTPATIENT)
Dept: HEMATOLOGY/ONCOLOGY | Facility: CLINIC | Age: 78
End: 2023-03-21
Payer: MEDICARE

## 2023-03-21 NOTE — NURSING
Reached out to patient to reschedule visit due to provider being out; unable to contact patient at this time.  Home number does not have a voicemail set up. LVM on patient's cell number.

## 2023-03-21 NOTE — NURSING
Spoke with patient and rescheduled her diagnosis clinic appt. Patient agreed to see Dr. Delvalle on Thursday, 3/23 at 1300.  Date, time, and location confirmed.

## 2023-03-22 NOTE — PROGRESS NOTES
[facility]      THORACIC MULTIDISCIPLINARY TUMOR BOARD  PATIENT REVIEW FORM  ___________________________________________________________________    CLINIC #: [id number]  DATE: 03/22/2023    TUMOR SITE:   Cancer Type: Other   Right lung nodule  Tumor Laterality: Right       Presenting Hospital / Clinic: [facility]     Virtual Tumor Board Conference: In person       PRESENTER:   Presenter: Dr. Zarco     Specialties Present: Medical Oncology; Hematology; Radiation Oncology; Surgical Oncology; Pathology; Navigation; Integrative Oncology; Pulmonology       PATIENT SUMMARY:   77 y.o. F underwent LDCT screening on 2/24/2023 with the following findings:  Lung-RADS Category 4A - Lungs: There is a 5 mm noncalcified nodule along the minor fissure within the right middle lobe.  There is a 4 mm noncalcified nodule within the right middle lobe.  There is a 9 mm noncalcified somewhat stellate appearing nodule within the right lower lobe. There is a 4 mm noncalcified nodule within the left upper lobe. There is a 4 mm noncalcified nodule within the left upper lobe.  There is a noncalcified 7 mm nodule within the left upper lobe.  There is a 4 mm noncalcified nodule within the left lower lobe.  Additional scattered mild tree-in-bud nodularity within both lower lobes suggestive of small airways disease as well as mild bibasilar atelectasis. No findings consistent with emphysema    Background Information  Patient Status: a new patient  Reason for Consultation: Initial Presentation         BOARD RECOMMENDATIONS:   Recommended Plan (General)  Recommended Plan: Additional observation  Recommended Additional Observation: follow up LDCT in 3 months      CONSULT NEEDED:     [] Surgery    [] Hem/Onc    [] Rad/Onc    [] Dietary                 [] Social Service    [] Psychology       [] Pulmonology    Cancer Staging   No matching staging  information was found for the patient.     GROUP STAGE:       [] O    [] 1A    [] IB    [] IIA    [] IIB     [] IIIA     [] IIIB     [] IIIC [] IV     [] Local recurrence     [] Regional recurrence     [] Distant recurrence   [] NSCLC     [] SCLC     Tumor type lung nodule        [x] [facility] Treatment Guidelines reviewed and care planned is consistent with guidelines.         (i.e., NCCN, NCI, PD, ACO, AUA, etc.)    PRESENTATION AT CANCER CONFERENCE:   Presentation at Cancer Conference: Prospective       CLINICAL TRIAL ELIGIBILITY:   Clinical Trial Eligibility  Clinical Trial Eligibility: Not discussed         Brenda ZONIA Dietrich
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
## 2023-03-23 ENCOUNTER — LAB VISIT (OUTPATIENT)
Dept: LAB | Facility: HOSPITAL | Age: 78
End: 2023-03-23
Attending: INTERNAL MEDICINE
Payer: MEDICARE

## 2023-03-23 ENCOUNTER — OFFICE VISIT (OUTPATIENT)
Dept: HEMATOLOGY/ONCOLOGY | Facility: CLINIC | Age: 78
End: 2023-03-23
Payer: MEDICARE

## 2023-03-23 VITALS
TEMPERATURE: 97 F | WEIGHT: 110.88 LBS | RESPIRATION RATE: 16 BRPM | HEART RATE: 60 BPM | BODY MASS INDEX: 21.77 KG/M2 | DIASTOLIC BLOOD PRESSURE: 58 MMHG | HEIGHT: 60 IN | OXYGEN SATURATION: 99 % | SYSTOLIC BLOOD PRESSURE: 100 MMHG

## 2023-03-23 DIAGNOSIS — N18.31 CHRONIC KIDNEY DISEASE, STAGE 3A: ICD-10-CM

## 2023-03-23 DIAGNOSIS — J44.9 CHRONIC OBSTRUCTIVE PULMONARY DISEASE, UNSPECIFIED COPD TYPE: ICD-10-CM

## 2023-03-23 DIAGNOSIS — D64.9 ANEMIA, UNSPECIFIED TYPE: ICD-10-CM

## 2023-03-23 DIAGNOSIS — I10 ESSENTIAL HYPERTENSION: Primary | ICD-10-CM

## 2023-03-23 DIAGNOSIS — D75.839 THROMBOCYTOSIS: ICD-10-CM

## 2023-03-23 DIAGNOSIS — Z87.891 PERSONAL HISTORY OF NICOTINE DEPENDENCE: ICD-10-CM

## 2023-03-23 DIAGNOSIS — R91.1 LUNG NODULE SEEN ON IMAGING STUDY: ICD-10-CM

## 2023-03-23 DIAGNOSIS — D53.9 NUTRITIONAL ANEMIA, UNSPECIFIED: ICD-10-CM

## 2023-03-23 LAB
ALBUMIN SERPL BCP-MCNC: 4.3 G/DL (ref 3.5–5.2)
ALP SERPL-CCNC: 82 U/L (ref 55–135)
ALT SERPL W/O P-5'-P-CCNC: 14 U/L (ref 10–44)
ANION GAP SERPL CALC-SCNC: 10 MMOL/L (ref 8–16)
AST SERPL-CCNC: 26 U/L (ref 10–40)
BASOPHILS # BLD AUTO: 0.15 K/UL (ref 0–0.2)
BASOPHILS NFR BLD: 2 % (ref 0–1.9)
BILIRUB SERPL-MCNC: 0.5 MG/DL (ref 0.1–1)
BUN SERPL-MCNC: 10 MG/DL (ref 8–23)
CALCIUM SERPL-MCNC: 9.7 MG/DL (ref 8.7–10.5)
CHLORIDE SERPL-SCNC: 95 MMOL/L (ref 95–110)
CO2 SERPL-SCNC: 26 MMOL/L (ref 23–29)
CREAT SERPL-MCNC: 1 MG/DL (ref 0.5–1.4)
DIFFERENTIAL METHOD: ABNORMAL
EOSINOPHIL # BLD AUTO: 0.7 K/UL (ref 0–0.5)
EOSINOPHIL NFR BLD: 9.6 % (ref 0–8)
ERYTHROCYTE [DISTWIDTH] IN BLOOD BY AUTOMATED COUNT: 12.7 % (ref 11.5–14.5)
ERYTHROCYTE [SEDIMENTATION RATE] IN BLOOD BY PHOTOMETRIC METHOD: 20 MM/HR (ref 0–36)
EST. GFR  (NO RACE VARIABLE): 58 ML/MIN/1.73 M^2
FERRITIN SERPL-MCNC: 53 NG/ML (ref 20–300)
GLUCOSE SERPL-MCNC: 96 MG/DL (ref 70–110)
HCT VFR BLD AUTO: 35.2 % (ref 37–48.5)
HGB BLD-MCNC: 11.7 G/DL (ref 12–16)
IMM GRANULOCYTES # BLD AUTO: 0.03 K/UL (ref 0–0.04)
IMM GRANULOCYTES NFR BLD AUTO: 0.4 % (ref 0–0.5)
IRON SERPL-MCNC: 69 UG/DL (ref 30–160)
LDH SERPL L TO P-CCNC: 168 U/L (ref 110–260)
LYMPHOCYTES # BLD AUTO: 2.3 K/UL (ref 1–4.8)
LYMPHOCYTES NFR BLD: 31.2 % (ref 18–48)
MCH RBC QN AUTO: 29.7 PG (ref 27–31)
MCHC RBC AUTO-ENTMCNC: 33.2 G/DL (ref 32–36)
MCV RBC AUTO: 89 FL (ref 82–98)
MONOCYTES # BLD AUTO: 0.9 K/UL (ref 0.3–1)
MONOCYTES NFR BLD: 11.4 % (ref 4–15)
NEUTROPHILS # BLD AUTO: 3.4 K/UL (ref 1.8–7.7)
NEUTROPHILS NFR BLD: 45.4 % (ref 38–73)
NRBC BLD-RTO: 0 /100 WBC
PLATELET # BLD AUTO: 560 K/UL (ref 150–450)
PMV BLD AUTO: 9 FL (ref 9.2–12.9)
POTASSIUM SERPL-SCNC: 4.7 MMOL/L (ref 3.5–5.1)
PROT SERPL-MCNC: 8 G/DL (ref 6–8.4)
RBC # BLD AUTO: 3.94 M/UL (ref 4–5.4)
SATURATED IRON: 15 % (ref 20–50)
SODIUM SERPL-SCNC: 131 MMOL/L (ref 136–145)
T4 FREE SERPL-MCNC: 0.89 NG/DL (ref 0.71–1.51)
TOTAL IRON BINDING CAPACITY: 462 UG/DL (ref 250–450)
TRANSFERRIN SERPL-MCNC: 312 MG/DL (ref 200–375)
TSH SERPL DL<=0.005 MIU/L-ACNC: 1.25 UIU/ML (ref 0.4–4)
VIT B12 SERPL-MCNC: 584 PG/ML (ref 210–950)
WBC # BLD AUTO: 7.43 K/UL (ref 3.9–12.7)

## 2023-03-23 PROCEDURE — 85025 COMPLETE CBC W/AUTO DIFF WBC: CPT | Mod: PN | Performed by: INTERNAL MEDICINE

## 2023-03-23 PROCEDURE — 1101F PT FALLS ASSESS-DOCD LE1/YR: CPT | Mod: CPTII,S$GLB,, | Performed by: INTERNAL MEDICINE

## 2023-03-23 PROCEDURE — 3074F PR MOST RECENT SYSTOLIC BLOOD PRESSURE < 130 MM HG: ICD-10-PCS | Mod: CPTII,S$GLB,, | Performed by: INTERNAL MEDICINE

## 2023-03-23 PROCEDURE — 84443 ASSAY THYROID STIM HORMONE: CPT | Performed by: INTERNAL MEDICINE

## 2023-03-23 PROCEDURE — 82728 ASSAY OF FERRITIN: CPT | Performed by: INTERNAL MEDICINE

## 2023-03-23 PROCEDURE — 1160F PR REVIEW ALL MEDS BY PRESCRIBER/CLIN PHARMACIST DOCUMENTED: ICD-10-PCS | Mod: CPTII,S$GLB,, | Performed by: INTERNAL MEDICINE

## 2023-03-23 PROCEDURE — 3074F SYST BP LT 130 MM HG: CPT | Mod: CPTII,S$GLB,, | Performed by: INTERNAL MEDICINE

## 2023-03-23 PROCEDURE — 3078F PR MOST RECENT DIASTOLIC BLOOD PRESSURE < 80 MM HG: ICD-10-PCS | Mod: CPTII,S$GLB,, | Performed by: INTERNAL MEDICINE

## 2023-03-23 PROCEDURE — 84165 PATHOLOGIST INTERPRETATION SPE: ICD-10-PCS | Mod: 26,,, | Performed by: PATHOLOGY

## 2023-03-23 PROCEDURE — 1125F PR PAIN SEVERITY QUANTIFIED, PAIN PRESENT: ICD-10-PCS | Mod: CPTII,S$GLB,, | Performed by: INTERNAL MEDICINE

## 2023-03-23 PROCEDURE — 3288F PR FALLS RISK ASSESSMENT DOCUMENTED: ICD-10-PCS | Mod: CPTII,S$GLB,, | Performed by: INTERNAL MEDICINE

## 2023-03-23 PROCEDURE — 3078F DIAST BP <80 MM HG: CPT | Mod: CPTII,S$GLB,, | Performed by: INTERNAL MEDICINE

## 2023-03-23 PROCEDURE — 99205 OFFICE O/P NEW HI 60 MIN: CPT | Mod: S$GLB,,, | Performed by: INTERNAL MEDICINE

## 2023-03-23 PROCEDURE — 99999 PR PBB SHADOW E&M-EST. PATIENT-LVL V: ICD-10-PCS | Mod: PBBFAC,,, | Performed by: INTERNAL MEDICINE

## 2023-03-23 PROCEDURE — 84439 ASSAY OF FREE THYROXINE: CPT | Performed by: INTERNAL MEDICINE

## 2023-03-23 PROCEDURE — 85652 RBC SED RATE AUTOMATED: CPT | Performed by: INTERNAL MEDICINE

## 2023-03-23 PROCEDURE — 1159F PR MEDICATION LIST DOCUMENTED IN MEDICAL RECORD: ICD-10-PCS | Mod: CPTII,S$GLB,, | Performed by: INTERNAL MEDICINE

## 2023-03-23 PROCEDURE — 80053 COMPREHEN METABOLIC PANEL: CPT | Mod: PN | Performed by: INTERNAL MEDICINE

## 2023-03-23 PROCEDURE — 82607 VITAMIN B-12: CPT | Performed by: INTERNAL MEDICINE

## 2023-03-23 PROCEDURE — 1159F MED LIST DOCD IN RCRD: CPT | Mod: CPTII,S$GLB,, | Performed by: INTERNAL MEDICINE

## 2023-03-23 PROCEDURE — 99499 UNLISTED E&M SERVICE: CPT | Mod: S$GLB,,, | Performed by: INTERNAL MEDICINE

## 2023-03-23 PROCEDURE — 84165 PROTEIN E-PHORESIS SERUM: CPT | Performed by: INTERNAL MEDICINE

## 2023-03-23 PROCEDURE — 1125F AMNT PAIN NOTED PAIN PRSNT: CPT | Mod: CPTII,S$GLB,, | Performed by: INTERNAL MEDICINE

## 2023-03-23 PROCEDURE — 99205 PR OFFICE/OUTPT VISIT, NEW, LEVL V, 60-74 MIN: ICD-10-PCS | Mod: S$GLB,,, | Performed by: INTERNAL MEDICINE

## 2023-03-23 PROCEDURE — 84466 ASSAY OF TRANSFERRIN: CPT | Performed by: INTERNAL MEDICINE

## 2023-03-23 PROCEDURE — 86334 IMMUNOFIX E-PHORESIS SERUM: CPT | Performed by: INTERNAL MEDICINE

## 2023-03-23 PROCEDURE — 1101F PR PT FALLS ASSESS DOC 0-1 FALLS W/OUT INJ PAST YR: ICD-10-PCS | Mod: CPTII,S$GLB,, | Performed by: INTERNAL MEDICINE

## 2023-03-23 PROCEDURE — 83615 LACTATE (LD) (LDH) ENZYME: CPT | Mod: PN | Performed by: INTERNAL MEDICINE

## 2023-03-23 PROCEDURE — 84165 PROTEIN E-PHORESIS SERUM: CPT | Mod: 26,,, | Performed by: PATHOLOGY

## 2023-03-23 PROCEDURE — 86334 PATHOLOGIST INTERPRETATION IFE: ICD-10-PCS | Mod: 26,,, | Performed by: PATHOLOGY

## 2023-03-23 PROCEDURE — 3288F FALL RISK ASSESSMENT DOCD: CPT | Mod: CPTII,S$GLB,, | Performed by: INTERNAL MEDICINE

## 2023-03-23 PROCEDURE — 86334 IMMUNOFIX E-PHORESIS SERUM: CPT | Mod: 26,,, | Performed by: PATHOLOGY

## 2023-03-23 PROCEDURE — 36415 COLL VENOUS BLD VENIPUNCTURE: CPT | Mod: PN | Performed by: INTERNAL MEDICINE

## 2023-03-23 PROCEDURE — 99999 PR PBB SHADOW E&M-EST. PATIENT-LVL V: CPT | Mod: PBBFAC,,, | Performed by: INTERNAL MEDICINE

## 2023-03-23 PROCEDURE — 1160F RVW MEDS BY RX/DR IN RCRD: CPT | Mod: CPTII,S$GLB,, | Performed by: INTERNAL MEDICINE

## 2023-03-24 LAB
ALBUMIN SERPL ELPH-MCNC: 4.31 G/DL (ref 3.35–5.55)
ALPHA1 GLOB SERPL ELPH-MCNC: 0.34 G/DL (ref 0.17–0.41)
ALPHA2 GLOB SERPL ELPH-MCNC: 0.79 G/DL (ref 0.43–0.99)
B-GLOBULIN SERPL ELPH-MCNC: 0.89 G/DL (ref 0.5–1.1)
GAMMA GLOB SERPL ELPH-MCNC: 1.18 G/DL (ref 0.67–1.58)
INTERPRETATION SERPL IFE-IMP: NORMAL
PROT SERPL-MCNC: 7.5 G/DL (ref 6–8.4)

## 2023-03-27 LAB
PATHOLOGIST INTERPRETATION IFE: NORMAL
PATHOLOGIST INTERPRETATION SPE: NORMAL

## 2023-04-10 ENCOUNTER — TELEPHONE (OUTPATIENT)
Dept: FAMILY MEDICINE | Facility: CLINIC | Age: 78
End: 2023-04-10
Payer: MEDICARE

## 2023-04-10 NOTE — TELEPHONE ENCOUNTER
Please advise, I need a verbal that it is okay to refill xanax and gabapentin. She has refills and has been seen recently.    Please se my telephone encounter.

## 2023-04-10 NOTE — TELEPHONE ENCOUNTER
The reason this is concerning is the combination of medicine she is on.  There have been a lot of fatalities with patients taking opioid plus a benzo.  There is now a black box warning for this combination.    It looks like she is on both at the same time for any given day and, additionally, on daily Ambien - similar concern.   I do not Rx these two to be taken together.  I am not comfortable approving this.  This puts the patient at very high risk.   This will likely have to wait until Dr Jeong gets back because of the risk involved.

## 2023-04-10 NOTE — TELEPHONE ENCOUNTER
----- Message from Jamila Dinh sent at 4/10/2023 11:02 AM CDT -----  Contact: self  Type:  RX Refill Request    Who Called: Pt  Refill or New Rx: Refill  RX Name and Strength:  1. ALPRAZolam (XANAX) 0.25 MG tablet  2. gabapentin (NEURONTIN) 600 MG tablet    How is the patient currently taking it? (ex. 1XDay):as directed  Is this a 30 day or 90 day RX:30  Preferred Pharmacy with phone number:  [id number] [place] [address] & [facility] [address] [place] [id number]  Phone: [phone] Fax: [phone]     Local or Mail Order:local  Ordering Provider: Dr. Jeong  Would the patient rather a call back or a response via [facility]? call  Best Call Back Number:[phone]    Please call pt to advise... thank you...

## 2023-04-10 NOTE — TELEPHONE ENCOUNTER
LOV:03/09/2023  NOV:06/09/2023    Called patient, on our end it looks like maybe she tried to refill her gabapentin and Xanax too soon last week. I let her know it should have been done every 7th of the month.     She stated no that [facility] isn't letting her get her medications with her pharmacy. I informed her I would call the pharmacy to see what the hold up is.       I spoke with the pharmacy, now the [facility] is flagging medications even if they've been on them for years. Due to other medications is take taking ( like NORCO) it flags it because these added could cause respiratory distress. They have to have a written or verbal ( I believe) order stating she can get these refilled.     Pharmacy stated they have reached out to PCP office by sending paper. I will try to locate it and ask my supervisor what I need to do since the PCP isn't here all week.    Per supervisor, Jeane, I am forwarding a request to another provider for a verbal that it is okay for her to get this refilled.     I informed patient I would call her to let her know.

## 2023-04-11 NOTE — TELEPHONE ENCOUNTER
----- Message from Mikel Claudio sent at 4/11/2023 12:05 PM CDT -----  Contact: pt  Type: Needs Medical Advice  Who Called:  pt         Best Call Back Number: [phone]    Additional Information: pt would like to speak with Leia is possible regarding her ALPRAZolam (XANAX) 0.25 MG tablet and gabapentin (NEURONTIN) 600 MG tablet. Please advise.

## 2023-04-11 NOTE — TELEPHONE ENCOUNTER
Informed pt that no Doctor feels comfortable refilling these two.    Informed pt of what  stated. She is okay with this and will wait for . she states she can do without the gabapentin.     Please advise.

## 2023-04-13 ENCOUNTER — OFFICE VISIT (OUTPATIENT)
Dept: HEMATOLOGY/ONCOLOGY | Facility: CLINIC | Age: 78
End: 2023-04-13
Payer: MEDICARE

## 2023-04-13 VITALS
HEIGHT: 61 IN | OXYGEN SATURATION: 99 % | WEIGHT: 110.69 LBS | TEMPERATURE: 98 F | DIASTOLIC BLOOD PRESSURE: 62 MMHG | SYSTOLIC BLOOD PRESSURE: 112 MMHG | BODY MASS INDEX: 20.9 KG/M2 | HEART RATE: 63 BPM | RESPIRATION RATE: 16 BRPM

## 2023-04-13 DIAGNOSIS — D50.9 IRON DEFICIENCY ANEMIA, UNSPECIFIED IRON DEFICIENCY ANEMIA TYPE: ICD-10-CM

## 2023-04-13 DIAGNOSIS — D75.839 THROMBOCYTOSIS: Primary | ICD-10-CM

## 2023-04-13 DIAGNOSIS — R91.1 LUNG NODULE SEEN ON IMAGING STUDY: ICD-10-CM

## 2023-04-13 DIAGNOSIS — E78.5 HYPERLIPIDEMIA, UNSPECIFIED HYPERLIPIDEMIA TYPE: ICD-10-CM

## 2023-04-13 DIAGNOSIS — M85.89 OSTEOPENIA OF MULTIPLE SITES: ICD-10-CM

## 2023-04-13 DIAGNOSIS — D47.3 ESSENTIAL (HEMORRHAGIC) THROMBOCYTHEMIA: ICD-10-CM

## 2023-04-13 DIAGNOSIS — I10 ESSENTIAL HYPERTENSION: ICD-10-CM

## 2023-04-13 PROCEDURE — 3074F PR MOST RECENT SYSTOLIC BLOOD PRESSURE < 130 MM HG: ICD-10-PCS | Mod: CPTII,S$GLB,, | Performed by: INTERNAL MEDICINE

## 2023-04-13 PROCEDURE — 1126F PR PAIN SEVERITY QUANTIFIED, NO PAIN PRESENT: ICD-10-PCS | Mod: CPTII,S$GLB,, | Performed by: INTERNAL MEDICINE

## 2023-04-13 PROCEDURE — 1160F RVW MEDS BY RX/DR IN RCRD: CPT | Mod: CPTII,S$GLB,, | Performed by: INTERNAL MEDICINE

## 2023-04-13 PROCEDURE — 99215 PR OFFICE/OUTPT VISIT, EST, LEVL V, 40-54 MIN: ICD-10-PCS | Mod: S$GLB,,, | Performed by: INTERNAL MEDICINE

## 2023-04-13 PROCEDURE — 1159F MED LIST DOCD IN RCRD: CPT | Mod: CPTII,S$GLB,, | Performed by: INTERNAL MEDICINE

## 2023-04-13 PROCEDURE — 99999 PR PBB SHADOW E&M-EST. PATIENT-LVL V: ICD-10-PCS | Mod: PBBFAC,,, | Performed by: INTERNAL MEDICINE

## 2023-04-13 PROCEDURE — 99215 OFFICE O/P EST HI 40 MIN: CPT | Mod: S$GLB,,, | Performed by: INTERNAL MEDICINE

## 2023-04-13 PROCEDURE — 3074F SYST BP LT 130 MM HG: CPT | Mod: CPTII,S$GLB,, | Performed by: INTERNAL MEDICINE

## 2023-04-13 PROCEDURE — 3288F PR FALLS RISK ASSESSMENT DOCUMENTED: ICD-10-PCS | Mod: CPTII,S$GLB,, | Performed by: INTERNAL MEDICINE

## 2023-04-13 PROCEDURE — 3288F FALL RISK ASSESSMENT DOCD: CPT | Mod: CPTII,S$GLB,, | Performed by: INTERNAL MEDICINE

## 2023-04-13 PROCEDURE — 1159F PR MEDICATION LIST DOCUMENTED IN MEDICAL RECORD: ICD-10-PCS | Mod: CPTII,S$GLB,, | Performed by: INTERNAL MEDICINE

## 2023-04-13 PROCEDURE — 1126F AMNT PAIN NOTED NONE PRSNT: CPT | Mod: CPTII,S$GLB,, | Performed by: INTERNAL MEDICINE

## 2023-04-13 PROCEDURE — 1101F PT FALLS ASSESS-DOCD LE1/YR: CPT | Mod: CPTII,S$GLB,, | Performed by: INTERNAL MEDICINE

## 2023-04-13 PROCEDURE — 3078F PR MOST RECENT DIASTOLIC BLOOD PRESSURE < 80 MM HG: ICD-10-PCS | Mod: CPTII,S$GLB,, | Performed by: INTERNAL MEDICINE

## 2023-04-13 PROCEDURE — 1101F PR PT FALLS ASSESS DOC 0-1 FALLS W/OUT INJ PAST YR: ICD-10-PCS | Mod: CPTII,S$GLB,, | Performed by: INTERNAL MEDICINE

## 2023-04-13 PROCEDURE — 99999 PR PBB SHADOW E&M-EST. PATIENT-LVL V: CPT | Mod: PBBFAC,,, | Performed by: INTERNAL MEDICINE

## 2023-04-13 PROCEDURE — 3078F DIAST BP <80 MM HG: CPT | Mod: CPTII,S$GLB,, | Performed by: INTERNAL MEDICINE

## 2023-04-13 PROCEDURE — 1160F PR REVIEW ALL MEDS BY PRESCRIBER/CLIN PHARMACIST DOCUMENTED: ICD-10-PCS | Mod: CPTII,S$GLB,, | Performed by: INTERNAL MEDICINE

## 2023-04-13 RX ORDER — HEPARIN 100 UNIT/ML
500 SYRINGE INTRAVENOUS
Status: CANCELLED | OUTPATIENT
Start: 2023-04-13

## 2023-04-13 RX ORDER — SODIUM CHLORIDE 0.9 % (FLUSH) 0.9 %
10 SYRINGE (ML) INJECTION
Status: CANCELLED | OUTPATIENT
Start: 2023-04-13

## 2023-04-13 RX ORDER — DIPHENHYDRAMINE HYDROCHLORIDE 50 MG/ML
50 INJECTION INTRAMUSCULAR; INTRAVENOUS ONCE AS NEEDED
Status: CANCELLED | OUTPATIENT
Start: 2023-04-13

## 2023-04-13 RX ORDER — EPINEPHRINE 0.3 MG/.3ML
0.3 INJECTION SUBCUTANEOUS ONCE AS NEEDED
Status: CANCELLED | OUTPATIENT
Start: 2023-04-13

## 2023-04-13 RX ORDER — METHYLPREDNISOLONE SOD SUCC 125 MG
125 VIAL (EA) INJECTION ONCE AS NEEDED
Status: CANCELLED | OUTPATIENT
Start: 2023-04-13
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
## 2023-04-14 ENCOUNTER — TELEPHONE (OUTPATIENT)
Dept: HEMATOLOGY/ONCOLOGY | Facility: CLINIC | Age: 78
End: 2023-04-14
Payer: MEDICARE

## 2023-04-14 NOTE — TELEPHONE ENCOUNTER
"Spoke with pt regarding lab appt that she refused yesterday. Pt states,"I am not coming there to have my blood done and I am not going to have that iron infusion. I am going to Dr. Jeong about this." I verbalized understanding and informed the pt to contact the clinic after talking with Dr. Chicas about the labs and iron infusions. Pt verbalized understanding.     "

## 2023-04-17 NOTE — TELEPHONE ENCOUNTER
----- Message from Stefanie Rush sent at 4/17/2023  8:18 AM CDT -----  Regarding: pharm needs clarification  Type:  Pharmacy Calling to Clarify an RX    Name of Caller:  Radha  Pharmacy Name:    [id number] - [address] AT [facility] [address] & [place] [address]  [address] [place] [id number]  Phone: [phone] Fax: [phone]        Prescription Name:  gabapentin (NEURONTIN) 600 MG tablet 30 tablet 11 2/7/2023    Sig: TAKE 1 TABLET(600 MG) BY MOUTH EVERY EVENING Strength: 600 mg   Patient not taking: Reported on 4/13/2023       Sent to pharmacy as: gabapentin (NEURONTIN) 600 MG tablet     And xanax   What do they need to clarify?:  needs the rationale and diagnosis for this medication, it is not advised for her age to be taking the these medication along with other things she is prescribed, please call to advise.   Best Call Back Number:  [phone]  Additional Information:

## 2023-04-17 NOTE — TELEPHONE ENCOUNTER
Staff to check allergies and alert patient to care gaps before forwarding to Provider or Refill Center    Pharmacy also requesting diagnosis.

## 2023-04-17 NOTE — TELEPHONE ENCOUNTER
No new care gaps identified.  [facility] Embedded Care Gaps. Reference number: [id number]. 4/17/2023   9:09:28 AM LOPEZT

## 2023-04-17 NOTE — TELEPHONE ENCOUNTER
Staff to review the following  prior to sending to Provider or  Refill Center :    a)review allergies   b) review care gaps  c) alert patient to care gaps and schedule if needed

## 2023-04-17 NOTE — TELEPHONE ENCOUNTER
----- Message from Chadwick Sanchez, Patient Care Assistant sent at 4/17/2023  9:34 AM CDT -----  Contact: Pt  Type:  RX Refill Request    Who Called:  Pt  Refill or New Rx:  Refill  RX Name and Strength:  ALPRAZolam (XANAX) 0.25 MG tablet, Gabapentin 600 mg  How is the patient currently taking it? (ex. 1XDay):  As Directed  Is this a 30 day or 90 day RX:  90  Preferred Pharmacy with phone number:    [facility][id number] - [place] [address] [address] [place] [address] [address] [employer]  [facility] [place] [address] [place] [address]  Phone: [phone] Fax: [phone]  Local or Mail Order:  local  Ordering Provider:  Jean-Paul Bowie Call Back Number:  [phone]  Additional Information:  Please contact pt upon completion-Thank you~    Pt is in need of her medication today due to having withdrawals from the medication.

## 2023-04-17 NOTE — TELEPHONE ENCOUNTER
Refill Routing Note   Medication(s) are not appropriate for processing by [facility] for the following reason(s):      Medication outside of protocol    [facility] action(s):  Route          Medication reconciliation completed: No     Appointments  past 12m or future 3m with PCP    Date Provider   Last Visit   3/9/2023 Juan Francisco Jeong MD   Next Visit   6/9/2023 Juan Francisco Jeong MD   ED visits in past 90 days: 0        Note composed:11:24 AM 04/17/2023

## 2023-04-18 RX ORDER — GABAPENTIN 600 MG/1
TABLET ORAL
Qty: 30 TABLET | Refills: 11 | Status: SHIPPED | OUTPATIENT
Start: 2023-04-18 | End: 2024-02-21 | Stop reason: SDUPTHER

## 2023-04-18 NOTE — TELEPHONE ENCOUNTER
----- Message from Amy Mcclure sent at 4/18/2023  2:02 PM CDT -----  Regarding: advice  Contact: Patient  Type: Needs Medical Advice  Who Called:  Patient   Symptoms (please be specific):    How long has patient had these symptoms:    Pharmacy name and phone #:    Best Call Back Number: [phone] (home)     Additional Information: Patient stated that she would like to speak with nurse. Please contact patient to advise. Thanks!

## 2023-04-18 NOTE — TELEPHONE ENCOUNTER
----- Message from Chadwick Sanchez, Patient Care Assistant sent at 4/18/2023  9:19 AM CDT -----  Contact: [facility]  Type: Needs Medical Advice    Who Called: [facility]  Best Call Back Number: [phone]  Inquiry/Question: Pharm is calling to get clarity on filling 4 controlled substances together for the pt. Please advise Thank you~  Xanax  Gabapentin  Norco  Ambien

## 2023-04-19 ENCOUNTER — TELEPHONE (OUTPATIENT)
Dept: FAMILY MEDICINE | Facility: CLINIC | Age: 78
End: 2023-04-19

## 2023-04-19 NOTE — TELEPHONE ENCOUNTER
Umm has attempted several times to get a okay if pt can take these medications listed together tazolpidem (AMBIEN) 10 mg Tab , HYDROcodone-acetaminophen (NORCO) 5-325 mg per tablet, ALPRAZolam (XANAX) 0.25 MG tablet, gabapentin (NEURONTIN) 600 MG tablet

## 2023-04-19 NOTE — TELEPHONE ENCOUNTER
----- Message from Mikel Claudio sent at 4/19/2023 12:35 PM CDT -----  Contact: pharmacy  Type: Needs Medical Advice  Who Called:  pharmacy     Pharmacy name and phone #:    [facility][id number] - [address] AT [facility] [address] & [address] [address]  Phone: [phone] Fax: [phone]      Best Call Back Number: [phone]  Additional Information: [facility] has attempted several times to get a okay if pt can take these medications listed together tazolpidem (AMBIEN) 10 mg Tab , HYDROcodone-acetaminophen (NORCO) 5-325 mg per tablet, ALPRAZolam (XANAX) 0.25 MG tablet, gabapentin (NEURONTIN) 600 MG tablet

## 2023-04-24 ENCOUNTER — TELEPHONE (OUTPATIENT)
Dept: FAMILY MEDICINE | Facility: CLINIC | Age: 78
End: 2023-04-24

## 2023-04-24 NOTE — TELEPHONE ENCOUNTER
----- Message from Candie Montemayor sent at 4/24/2023 11:54 AM CDT -----  Contact: pt [phone]  Type: Needs Medical Advice  Who Called:  pt  Symptoms (please be specific):  sinus infection/headache  How long has patient had these symptoms:  2 weeks  Pharmacy name and phone #:    [facility] #[id number] - [place] [address] [address] [facility] AT [facility] [address] & [employer] [address] [place] [id number]  Phone: [phone] Fax: [phone]    Best Call Back Number: [phone]    Additional Information: Patient is calling the office wanting to know if the  Can prescribe he something for her sinus infection/headache. Please call back and advise.         24.3

## 2023-04-28 ENCOUNTER — TELEPHONE (OUTPATIENT)
Dept: FAMILY MEDICINE | Facility: CLINIC | Age: 78
End: 2023-04-28
Payer: MEDICARE

## 2023-04-28 NOTE — TELEPHONE ENCOUNTER
----- Message from Porsche Ward sent at 4/28/2023 12:05 PM CDT -----  Contact: patient  Type:  Patient Returning Call    Who Called:  patient  Who Left Message for Patient:  morris  Does the patient know what this is regarding?:    Best Call Back Number:  [phone] (home)   Additional Information:

## 2023-05-01 ENCOUNTER — OFFICE VISIT (OUTPATIENT)
Dept: URGENT CARE | Facility: CLINIC | Age: 78
End: 2023-05-01
Payer: MEDICARE

## 2023-05-01 VITALS
DIASTOLIC BLOOD PRESSURE: 76 MMHG | RESPIRATION RATE: 20 BRPM | HEIGHT: 61 IN | WEIGHT: 110 LBS | HEART RATE: 64 BPM | SYSTOLIC BLOOD PRESSURE: 132 MMHG | TEMPERATURE: 98 F | BODY MASS INDEX: 20.77 KG/M2 | OXYGEN SATURATION: 99 %

## 2023-05-01 DIAGNOSIS — J32.9 BACTERIAL SINUSITIS: Primary | ICD-10-CM

## 2023-05-01 DIAGNOSIS — B96.89 BACTERIAL SINUSITIS: Primary | ICD-10-CM

## 2023-05-01 PROCEDURE — 99213 PR OFFICE/OUTPT VISIT, EST, LEVL III, 20-29 MIN: ICD-10-PCS | Mod: S$GLB,,, | Performed by: PHYSICIAN ASSISTANT

## 2023-05-01 PROCEDURE — 99213 OFFICE O/P EST LOW 20 MIN: CPT | Mod: S$GLB,,, | Performed by: PHYSICIAN ASSISTANT

## 2023-05-01 RX ORDER — DOXYCYCLINE 100 MG/1
100 CAPSULE ORAL 2 TIMES DAILY
Qty: 20 CAPSULE | Refills: 0 | Status: SHIPPED | OUTPATIENT
Start: 2023-05-01 | End: 2023-05-11

## 2023-05-01 NOTE — PROGRESS NOTES
"Subjective:      Patient ID: Luis Miguel Murcia is a 78 y.o. female.    Vitals:  height is 5' 1" (1.549 m) and weight is 49.9 kg (110 lb). Her temperature is 97.8 °F (36.6 °C). Her blood pressure is 132/76 and her pulse is 64. Her respiration is 20 and oxygen saturation is 99%.     Chief Complaint: Sinus Problem    Pt c/o productive cough, congestion, headaches and fatigue x 3 weeks. Has taken otc expectorant, oral steroids and nasal decongestants with mild relief. Hx COPD.     Sinus Problem  This is a chronic problem. The current episode started 1 to 4 weeks ago. The problem is unchanged. Her pain is at a severity of 6/10. The pain is moderate. Associated symptoms include chills, congestion, coughing, headaches and sinus pressure. Past treatments include saline nose sprays and oral decongestants (oral steroids). The treatment provided mild relief.     Constitution: Positive for chills. Negative for fever.   HENT:  Positive for congestion and sinus pressure.    Respiratory:  Positive for cough and COPD. Negative for asthma.    Allergic/Immunologic: Negative for asthma.   Neurological:  Positive for headaches.    Objective:     Physical Exam   Constitutional: She does not appear ill. No distress.   HENT:   Head: Normocephalic and atraumatic.   Ears:   Right Ear: Tympanic membrane, external ear and ear canal normal.   Left Ear: Tympanic membrane, external ear and ear canal normal.   Nose: Right sinus exhibits maxillary sinus tenderness and frontal sinus tenderness. Left sinus exhibits maxillary sinus tenderness and frontal sinus tenderness.   Mouth/Throat: Mucous membranes are moist. No oropharyngeal exudate or posterior oropharyngeal erythema. Oropharynx is clear.   Eyes: Conjunctivae are normal. Right eye exhibits no discharge. Left eye exhibits no discharge. Extraocular movement intact   Cardiovascular: Normal rate and regular rhythm.   Murmur heard.  Pulmonary/Chest: Effort normal and breath sounds normal. " She has no wheezes. She has no rhonchi. She has no rales.   Abdominal: Normal appearance.   Musculoskeletal: Normal range of motion.         General: Normal range of motion.   Neurological: no focal deficit. She is alert.   Skin: Skin is warm, dry and not pale. jaundice  Psychiatric: Her behavior is normal. Mood, judgment and thought content normal.   Nursing note and vitals reviewed.    Assessment:     1. Bacterial sinusitis        Plan:       Bacterial sinusitis    Other orders  -     doxycycline (VIBRAMYCIN) 100 MG Cap; Take 1 capsule (100 mg total) by mouth 2 (two) times daily. for 10 days  Dispense: 20 capsule; Refill: 0      Sinusitis in Adults   The Basics   Written by the doctors and editors at [facility]   What is sinusitis? -- Sinusitis is a condition that can cause a stuffy nose, pain in the face, and discharge (mucus) from the nose. The sinuses are hollow areas in the bones of the face (figure 1). They have a thin lining that normally makes a small amount of mucus. When this lining gets irritated or infected, it swells and makes extra mucus. This causes symptoms.  Sinusitis can occur when a person gets sick with a cold. The germs causing the cold can also infect the sinuses. Many times, a person feels like their cold is getting better. But then they get sinusitis and begin to feel sick again.  What are the symptoms of sinusitis? -- Common symptoms of sinusitis include:  Stuffy or blocked nose  Thick white, yellow, or green discharge from the nose  Pain in the teeth  Pain or pressure in the face - This often feels worse when a person bends forward.  People with sinusitis can also have other symptoms that include:  Fever  Cough  Trouble smelling  Ear pressure or fullness  Headache  Bad breath  Feeling tired  Most of the time, symptoms start to improve in 7 to 10 days.  Should I see a doctor or nurse? -- See your doctor or nurse if your symptoms last more than 10 days, or if your symptoms first get better but  then get worse.  Rarely, sinusitis can lead to serious problems. See your doctor or nurse right away (do not wait 10 days) if you have:  Fever higher than 102°F (38.9°C)  Sudden and severe pain in the face and head  Trouble seeing or seeing double  Trouble thinking clearly  Swelling or redness around one or both eyes  A stiff neck  Is there anything I can do on my own to feel better? -- Yes. To reduce your symptoms, you can:  Take an over-the-counter pain reliever to reduce the pain  Rinse your nose and sinuses with salt water a few times a day - Ask your doctor or nurse about the best way to do this.  Your doctor might also prescribe a steroid nose spray to reduce the swelling in your nose. (These kinds of steroid nose sprays are safe to take, and do not contain the same steroids that some athletes take illegally.)  How is sinusitis treated? -- Most of the time, sinusitis does not need to be treated with antibiotic medicines. This is because most sinusitis is caused by viruses, not bacteria, and antibiotics do not kill viruses. In fact, even sinusitis caused by bacteria will usually get better on its own without antibiotics.   Some people with sinusitis do need treatment with antibiotics. If your symptoms have not improved after 10 days, ask your doctor if you should take antibiotics. Your doctor might recommend that you wait 1 more week to see if your symptoms improve. But if you have symptoms such as a fever or a lot of pain, they might prescribe antibiotics. It is important to follow your doctor's instructions about taking your antibiotics.  What if my symptoms do not get better? -- If your symptoms do not get better, talk with your doctor or nurse. They might order tests to figure out why you still have symptoms. These can include:  CT scan or other imaging tests - Imaging tests create pictures of the inside of the body.  A test to look inside the sinuses - For this test, a doctor puts a thin tube with a camera  "on the end into the nose and up into the sinuses.  Some people get a lot of sinus infections or have symptoms that last at least 3 months. These people can have a different type of sinusitis called "chronic sinusitis." Chronic sinusitis can be caused by different things. For example, some people have growths inside their nose or sinuses that are called "polyps." Other people have allergies that cause their symptoms.  Chronic sinusitis can be treated in different ways. If you have chronic sinusitis, talk with your doctor about which treatments are right for you.  All topics are updated as new evidence becomes available and our peer review process is complete.  This topic retrieved from [employer] on: Sep 21, 2021.  Topic 70409 Version 17.0  Release: 29.4.2 - C29.263  © 2021 UpToDate, Inc. and/or its affiliates. All rights reserved.  figure 1: Sinuses of the face     This drawing shows the sinuses of the face, from the side and front views.  Graphic 654556 Version 1.0     Consumer Information Use and Disclaimer   This information is not specific medical advice and does not replace information you receive from your health care provider. This is only a brief summary of general information. It does NOT include all information about conditions, illnesses, injuries, tests, procedures, treatments, therapies, discharge instructions or life-style choices that may apply to you. You must talk with your health care provider for complete information about your health and treatment options. This information should not be used to decide whether or not to accept your health care provider's advice, instructions or recommendations. Only your health care provider has the knowledge and training to provide advice that is right for you. The use of this information is governed by the [employer] End User License Agreement, available at https://[email or web address].Nautal/en/solutions/[email or web address] [email or web address]/lori.The use of [employer] content is governed by the " [facility] Terms of Use. ©2021 [employer]. All rights reserved.  Copyright   © 2021 [employer]. and/or its affiliates. All rights reserved.

## 2023-05-04 ENCOUNTER — TELEPHONE (OUTPATIENT)
Dept: GASTROENTEROLOGY | Facility: CLINIC | Age: 78
End: 2023-05-04
Payer: MEDICARE

## 2023-05-04 NOTE — TELEPHONE ENCOUNTER
Attempted to reach the patient to set up Colonoscopy per orders, left message, clinic number provided.

## 2023-05-23 ENCOUNTER — HOSPITAL ENCOUNTER (OUTPATIENT)
Dept: RADIOLOGY | Facility: HOSPITAL | Age: 78
Discharge: HOME | End: 2023-05-23
Attending: INTERNAL MEDICINE
Payer: MEDICARE

## 2023-05-23 DIAGNOSIS — Z87.891 PERSONAL HISTORY OF NICOTINE DEPENDENCE: ICD-10-CM

## 2023-05-23 DIAGNOSIS — R91.1 LUNG NODULE SEEN ON IMAGING STUDY: ICD-10-CM

## 2023-05-23 PROCEDURE — 71271 CT THORAX LUNG CANCER SCR C-: CPT | Mod: TC,PO

## 2023-05-23 PROCEDURE — 71271 CT THORAX LUNG CANCER SCR C-: CPT | Mod: 26,,, | Performed by: RADIOLOGY

## 2023-05-23 PROCEDURE — 71271 CT CHEST LUNG SCREENING LOW DOSE: ICD-10-PCS | Mod: 26,,, | Performed by: RADIOLOGY

## 2023-05-24 ENCOUNTER — DOCUMENTATION ONLY (OUTPATIENT)
Dept: HEMATOLOGY/ONCOLOGY | Facility: CLINIC | Age: 78
End: 2023-05-24
Payer: MEDICARE

## 2023-05-24 NOTE — NURSING
Chart reviewed for navigational needs after follow up LDCT scan. Lung rads score is now a 2.   No oncology navigator needs noted.  Will sign off at this time.

## 2023-05-26 ENCOUNTER — TELEPHONE (OUTPATIENT)
Dept: GASTROENTEROLOGY | Facility: CLINIC | Age: 78
End: 2023-05-26
Payer: MEDICARE

## 2023-05-26 NOTE — TELEPHONE ENCOUNTER
Left msg for pt to return to schedule procedure. Call back number provided. Several attempts have been made to contact pt to schedule ordered procedure. Case request canceled. Letter placed in mail.

## 2023-06-05 ENCOUNTER — TELEPHONE (OUTPATIENT)
Dept: FAMILY MEDICINE | Facility: CLINIC | Age: 78
End: 2023-06-05
Payer: MEDICARE

## 2023-06-15 ENCOUNTER — OFFICE VISIT (OUTPATIENT)
Dept: FAMILY MEDICINE | Facility: CLINIC | Age: 78
End: 2023-06-15
Payer: MEDICARE

## 2023-06-15 VITALS
HEART RATE: 64 BPM | OXYGEN SATURATION: 97 % | SYSTOLIC BLOOD PRESSURE: 128 MMHG | DIASTOLIC BLOOD PRESSURE: 80 MMHG | WEIGHT: 109.56 LBS | TEMPERATURE: 98 F | BODY MASS INDEX: 20.7 KG/M2

## 2023-06-15 DIAGNOSIS — F41.9 ANXIETY: Primary | ICD-10-CM

## 2023-06-15 DIAGNOSIS — M51.36 DDD (DEGENERATIVE DISC DISEASE), LUMBAR: ICD-10-CM

## 2023-06-15 DIAGNOSIS — G47.00 INSOMNIA, UNSPECIFIED TYPE: ICD-10-CM

## 2023-06-15 PROCEDURE — 3288F FALL RISK ASSESSMENT DOCD: CPT | Mod: CPTII,S$GLB,, | Performed by: NURSE PRACTITIONER

## 2023-06-15 PROCEDURE — 3288F PR FALLS RISK ASSESSMENT DOCUMENTED: ICD-10-PCS | Mod: CPTII,S$GLB,, | Performed by: NURSE PRACTITIONER

## 2023-06-15 PROCEDURE — 3074F SYST BP LT 130 MM HG: CPT | Mod: CPTII,S$GLB,, | Performed by: NURSE PRACTITIONER

## 2023-06-15 PROCEDURE — 3074F PR MOST RECENT SYSTOLIC BLOOD PRESSURE < 130 MM HG: ICD-10-PCS | Mod: CPTII,S$GLB,, | Performed by: NURSE PRACTITIONER

## 2023-06-15 PROCEDURE — 3079F PR MOST RECENT DIASTOLIC BLOOD PRESSURE 80-89 MM HG: ICD-10-PCS | Mod: CPTII,S$GLB,, | Performed by: NURSE PRACTITIONER

## 2023-06-15 PROCEDURE — 1125F AMNT PAIN NOTED PAIN PRSNT: CPT | Mod: CPTII,S$GLB,, | Performed by: NURSE PRACTITIONER

## 2023-06-15 PROCEDURE — 99214 OFFICE O/P EST MOD 30 MIN: CPT | Mod: S$GLB,,, | Performed by: NURSE PRACTITIONER

## 2023-06-15 PROCEDURE — 1125F PR PAIN SEVERITY QUANTIFIED, PAIN PRESENT: ICD-10-PCS | Mod: CPTII,S$GLB,, | Performed by: NURSE PRACTITIONER

## 2023-06-15 PROCEDURE — 1159F MED LIST DOCD IN RCRD: CPT | Mod: CPTII,S$GLB,, | Performed by: NURSE PRACTITIONER

## 2023-06-15 PROCEDURE — 99999 PR PBB SHADOW E&M-EST. PATIENT-LVL V: ICD-10-PCS | Mod: PBBFAC,,, | Performed by: NURSE PRACTITIONER

## 2023-06-15 PROCEDURE — 1101F PT FALLS ASSESS-DOCD LE1/YR: CPT | Mod: CPTII,S$GLB,, | Performed by: NURSE PRACTITIONER

## 2023-06-15 PROCEDURE — 99999 PR PBB SHADOW E&M-EST. PATIENT-LVL V: CPT | Mod: PBBFAC,,, | Performed by: NURSE PRACTITIONER

## 2023-06-15 PROCEDURE — 1159F PR MEDICATION LIST DOCUMENTED IN MEDICAL RECORD: ICD-10-PCS | Mod: CPTII,S$GLB,, | Performed by: NURSE PRACTITIONER

## 2023-06-15 PROCEDURE — 3079F DIAST BP 80-89 MM HG: CPT | Mod: CPTII,S$GLB,, | Performed by: NURSE PRACTITIONER

## 2023-06-15 PROCEDURE — 1101F PR PT FALLS ASSESS DOC 0-1 FALLS W/OUT INJ PAST YR: ICD-10-PCS | Mod: CPTII,S$GLB,, | Performed by: NURSE PRACTITIONER

## 2023-06-15 PROCEDURE — 99214 PR OFFICE/OUTPT VISIT, EST, LEVL IV, 30-39 MIN: ICD-10-PCS | Mod: S$GLB,,, | Performed by: NURSE PRACTITIONER

## 2023-06-15 RX ORDER — ESCITALOPRAM OXALATE 10 MG/1
10 TABLET ORAL DAILY
Qty: 30 TABLET | Refills: 11 | Status: SHIPPED | OUTPATIENT
Start: 2023-06-15 | End: 2023-12-04 | Stop reason: SINTOL

## 2023-06-15 RX ORDER — HYDROCODONE BITARTRATE AND ACETAMINOPHEN 5; 325 MG/1; MG/1
1 TABLET ORAL EVERY 6 HOURS PRN
Qty: 60 TABLET | Refills: 0 | Status: SHIPPED | OUTPATIENT
Start: 2023-06-15 | End: 2023-07-13 | Stop reason: SDUPTHER

## 2023-06-15 NOTE — TELEPHONE ENCOUNTER
Patient of Dr Jeong, seen today for med follow up in his absence. You have seen her in the past    Requesting hydrocodone refill--has been prescribed 5mg q6h prn long term    Please consider fill in his absence
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
## 2023-07-13 ENCOUNTER — OFFICE VISIT (OUTPATIENT)
Dept: FAMILY MEDICINE | Facility: CLINIC | Age: 78
End: 2023-07-13
Payer: MEDICARE

## 2023-07-13 VITALS
DIASTOLIC BLOOD PRESSURE: 64 MMHG | SYSTOLIC BLOOD PRESSURE: 110 MMHG | HEART RATE: 68 BPM | OXYGEN SATURATION: 98 % | BODY MASS INDEX: 20.91 KG/M2 | TEMPERATURE: 98 F | WEIGHT: 110.69 LBS

## 2023-07-13 DIAGNOSIS — G89.29 CHRONIC LOW BACK PAIN, UNSPECIFIED BACK PAIN LATERALITY, UNSPECIFIED WHETHER SCIATICA PRESENT: ICD-10-CM

## 2023-07-13 DIAGNOSIS — M54.50 CHRONIC LOW BACK PAIN, UNSPECIFIED BACK PAIN LATERALITY, UNSPECIFIED WHETHER SCIATICA PRESENT: ICD-10-CM

## 2023-07-13 DIAGNOSIS — F41.9 ANXIETY: Primary | ICD-10-CM

## 2023-07-13 DIAGNOSIS — F41.9 ANXIETY: ICD-10-CM

## 2023-07-13 DIAGNOSIS — G47.00 INSOMNIA, UNSPECIFIED TYPE: ICD-10-CM

## 2023-07-13 DIAGNOSIS — M54.16 LUMBAR RADICULOPATHY: ICD-10-CM

## 2023-07-13 DIAGNOSIS — M51.36 DDD (DEGENERATIVE DISC DISEASE), LUMBAR: Primary | ICD-10-CM

## 2023-07-13 PROCEDURE — 99214 PR OFFICE/OUTPT VISIT, EST, LEVL IV, 30-39 MIN: ICD-10-PCS | Mod: S$GLB,,, | Performed by: NURSE PRACTITIONER

## 2023-07-13 PROCEDURE — 1101F PR PT FALLS ASSESS DOC 0-1 FALLS W/OUT INJ PAST YR: ICD-10-PCS | Mod: CPTII,S$GLB,, | Performed by: NURSE PRACTITIONER

## 2023-07-13 PROCEDURE — 3074F SYST BP LT 130 MM HG: CPT | Mod: CPTII,S$GLB,, | Performed by: NURSE PRACTITIONER

## 2023-07-13 PROCEDURE — 99999 PR PBB SHADOW E&M-EST. PATIENT-LVL IV: CPT | Mod: PBBFAC,,, | Performed by: NURSE PRACTITIONER

## 2023-07-13 PROCEDURE — 3288F FALL RISK ASSESSMENT DOCD: CPT | Mod: CPTII,S$GLB,, | Performed by: NURSE PRACTITIONER

## 2023-07-13 PROCEDURE — 3078F DIAST BP <80 MM HG: CPT | Mod: CPTII,S$GLB,, | Performed by: NURSE PRACTITIONER

## 2023-07-13 PROCEDURE — 1126F AMNT PAIN NOTED NONE PRSNT: CPT | Mod: CPTII,S$GLB,, | Performed by: NURSE PRACTITIONER

## 2023-07-13 PROCEDURE — 1101F PT FALLS ASSESS-DOCD LE1/YR: CPT | Mod: CPTII,S$GLB,, | Performed by: NURSE PRACTITIONER

## 2023-07-13 PROCEDURE — 3074F PR MOST RECENT SYSTOLIC BLOOD PRESSURE < 130 MM HG: ICD-10-PCS | Mod: CPTII,S$GLB,, | Performed by: NURSE PRACTITIONER

## 2023-07-13 PROCEDURE — 1159F MED LIST DOCD IN RCRD: CPT | Mod: CPTII,S$GLB,, | Performed by: NURSE PRACTITIONER

## 2023-07-13 PROCEDURE — 99999 PR PBB SHADOW E&M-EST. PATIENT-LVL IV: ICD-10-PCS | Mod: PBBFAC,,, | Performed by: NURSE PRACTITIONER

## 2023-07-13 PROCEDURE — 99214 OFFICE O/P EST MOD 30 MIN: CPT | Mod: S$GLB,,, | Performed by: NURSE PRACTITIONER

## 2023-07-13 PROCEDURE — 1126F PR PAIN SEVERITY QUANTIFIED, NO PAIN PRESENT: ICD-10-PCS | Mod: CPTII,S$GLB,, | Performed by: NURSE PRACTITIONER

## 2023-07-13 PROCEDURE — 3078F PR MOST RECENT DIASTOLIC BLOOD PRESSURE < 80 MM HG: ICD-10-PCS | Mod: CPTII,S$GLB,, | Performed by: NURSE PRACTITIONER

## 2023-07-13 PROCEDURE — 3288F PR FALLS RISK ASSESSMENT DOCUMENTED: ICD-10-PCS | Mod: CPTII,S$GLB,, | Performed by: NURSE PRACTITIONER

## 2023-07-13 PROCEDURE — 1159F PR MEDICATION LIST DOCUMENTED IN MEDICAL RECORD: ICD-10-PCS | Mod: CPTII,S$GLB,, | Performed by: NURSE PRACTITIONER

## 2023-07-13 NOTE — TELEPHONE ENCOUNTER
Patient of Dr Jeong--I saw her last month  Current Rx xanax 0.25mg BID, ambien 10mg qhs, hydrocodone 5mg q6hprn    I sent him message to fill xanax before he leaves--did not refill    Dr Conteh sent short term Rx hydrocodone--almost out    Has enough ambien until September. Seeing Alejadnrokeily 9/12    I tried starting her on Lexapro--stopped after 1 day due to side effects.     Patient very upset today, very worried about her medications being refilled. Said she went to outside [facility] management who wouldn't refill her hydrocodone.     Please consider refills.
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
## 2023-07-14 RX ORDER — ALPRAZOLAM 0.25 MG/1
0.25 TABLET ORAL 2 TIMES DAILY PRN
Qty: 60 TABLET | Refills: 2 | OUTPATIENT
Start: 2023-07-14

## 2023-07-14 RX ORDER — HYDROCODONE BITARTRATE AND ACETAMINOPHEN 5; 325 MG/1; MG/1
1 TABLET ORAL EVERY 6 HOURS PRN
Qty: 60 TABLET | Refills: 0 | Status: SHIPPED | OUTPATIENT
Start: 2023-07-14 | End: 2023-11-09 | Stop reason: CLARIF

## 2023-07-17 ENCOUNTER — TELEPHONE (OUTPATIENT)
Dept: FAMILY MEDICINE | Facility: CLINIC | Age: 78
End: 2023-07-17
Payer: MEDICARE

## 2023-07-17 NOTE — TELEPHONE ENCOUNTER
----- Message from Candie Montemayor sent at 7/14/2023  1:55 PM CDT -----  Contact: pt  Type: Needs Medical Advice  Who Called:  pt  Best Call Back Number:[phone]      Additional Information: Pt is calling the office needs someone in the office to contact her regarding meds.Please call back and advise.

## 2023-07-27 DIAGNOSIS — F41.9 ANXIETY: ICD-10-CM

## 2023-08-04 RX ORDER — ALPRAZOLAM 0.25 MG/1
TABLET ORAL
Qty: 60 TABLET | Refills: 0 | Status: SHIPPED | OUTPATIENT
Start: 2023-08-04 | End: 2023-09-05

## 2023-08-15 ENCOUNTER — PATIENT OUTREACH (OUTPATIENT)
Dept: ADMINISTRATIVE | Facility: HOSPITAL | Age: 78
End: 2023-08-15
Payer: MEDICARE

## 2023-08-15 NOTE — PROGRESS NOTES
Population Health Chart Review & Patient Outreach Details:     Reason for Outreach Encounter:     [x]  Non-Compliant Report   []  Payor Report (Hum[employer], PHN, BCBS, MSSP, MCIP, UHC, etc.)   []  Pre-Visit Chart Review     Updates Requested / Reviewed:     [x]  Care Everywhere    [x]     []  External Sources ([facility], [facility], DIS, etc.)   [x]  Care Team Updated    Patient Outreach Method:    []  Telephone Outreach Completed   [] Successful   [] Left Voicemail   [] Unable to Contact (wrong number, no voicemail)  []  [employer] [facility] Outreach Sent  []  Letter Outreach Mailed  []  Fax Sent for External Records  []  External Records Upload    Health Maintenance Topics Addressed and Outreach Outcomes / Actions Taken:        []      Breast Cancer Screening []  Mammo Scheduled      []  External Records Requested     []  Added Reminder to Complete to Upcoming Primary Care Appt Notes     []  Patient Declined     []  Patient Will Call Back to Schedule     []  Patient Will Schedule with External Provider / Order Routed if Applicable             []       Cervical Cancer Screening []  Pap Scheduled      []  External Records Requested     []  Added Reminder to Complete to Upcoming Primary Care Appt Notes     []  Patient Declined     []  Patient Will Call Back to Schedule     []  Patient Will Schedule with External Provider               []          Colorectal Cancer Screening []  Colonoscopy Case Request or Referral Placed     []  External Records Requested     []  Added Reminder to Complete to Upcoming Primary Care Appt Notes     []  Patient Declined     []  Patient Will Call Back to Schedule     []  Patient Will Schedule with External Provider     []  Fit Kit Mailed (add the SmartPhrase under additional notes)     []  Reminded Patient to Complete Home Test             []      Diabetic Eye Exam []  Eye Camera Scheduled or Optometry Referral Placed     []  External Records Requested     []  Added Reminder to Complete to  Upcoming Primary Care Appt Notes     []  Patient Declined     []  Patient Will Call Back to Schedule     []  Patient Will Schedule with External Provider             []      Blood Pressure Control []  Primary Care Follow Up Visit Scheduled     []  Remote Blood Pressure Reading Captured     []  Added Reminder to Complete to Upcoming Primary Care Appt Notes     []  Patient Declined     []  Patient Will Call Back / Patient Will Send Portal Message with Reading     []  Patient Will Call Back to Schedule Provider Visit             []       HbA1c & Other Labs []  Lab Appt Scheduled for Due Labs     []  Primary Care Follow Up Visit Scheduled      []  Reminded Patient to Complete Home Test     []  Added Reminder to Complete to Upcoming Primary Care Appt Notes     []  Patient Declined     []  Patient Will Call Back to Schedule     []  Patient Will Schedule with External Provider / Order Routed if Applicable           [x]    Schedule Primary Care Appt [x]  Primary Care Appt Scheduled     []  Patient Declined     []  Patient Will Call Back to Schedule     []  Pt Established with External Provider & Updated Care Team             []      Medication Adherence []  Primary Care Appointment Scheduled     []  Added Reminder to Upcoming Primary Care Appt Notes     []  Patient Reminded to  Prescription     []  Patient Declined, Provider Notified if Needed     []  Sent Provider Message to Review and/or Add Exclusion to Problem List             []      Osteoporosis Screening []  DXA Appointment Scheduled     []  External Records Requested     []  Added Reminder to Complete to Upcoming Primary Care Appt Notes     []  Patient Declined     []  Patient Will Call Back to Schedule     []  Patient Will Schedule with External Provider / Order Routed if Applicable     Additional Care Coordinator Notes:         Further Action Needed If Patient Returns Outreach:

## 2023-08-22 DIAGNOSIS — K21.9 LPRD (LARYNGOPHARYNGEAL REFLUX DISEASE): ICD-10-CM

## 2023-08-22 RX ORDER — DEXLANSOPRAZOLE 60 MG/1
CAPSULE, DELAYED RELEASE ORAL
Qty: 90 CAPSULE | Refills: 1 | Status: SHIPPED | OUTPATIENT
Start: 2023-08-22 | End: 2024-02-19

## 2023-08-22 NOTE — TELEPHONE ENCOUNTER
----- Message from Maame Massey sent at 8/22/2023 11:00 AM CDT -----  Regarding: RX Refill Request  Contact: JUAN FRANCISCO CHOWDHURY [phone]  Type:  RX Refill Request        Who Called: JUAN FRANCISCO CHOWDHURY    Refill or New Rx:  REFILL    RX Name and Strength:  dexlansoprazole (DEXILANT) 60 mg capsule    How is the patient currently taking it? (ex. 1XDay):  1X DAY     Is this a 30 day or 90 day RX:  90 DAY     Preferred Pharmacy with phone number:    [id number] - [place] [address] [address] [facility] [address] [facility] [address] [place] [address] [facility] [address] [place] [id number]  Phone: [phone] Fax: [phone]    Local or Mail Order:  LOCAL    Ordering Provider:  JUAN C RHODES Call Back Number:  [phone] (home)       Additional Information:  Patient is calling to request Rx refill on dexlansoprazole (DEXILANT) 60 mg capsule  Please call back and advise. Thanks

## 2023-09-04 DIAGNOSIS — F41.9 ANXIETY: ICD-10-CM

## 2023-09-04 NOTE — TELEPHONE ENCOUNTER
Refill Routing Note   Medication(s) are not appropriate for processing by [facility] for the following reason(s):      Medication outside of protocol  Non-participating provider    [facility] action(s):  Route Care Due:  None identified            Appointments  past 12m or future 3m with PCP    Date Provider   Last Visit   Visit date not found Terence Phillip MD   Next Visit   9/12/2023 Terence Phillip MD   ED visits in past 90 days: 0        Note composed:10:44 AM 09/04/2023

## 2023-09-05 RX ORDER — ALPRAZOLAM 0.25 MG/1
TABLET ORAL
Qty: 60 TABLET | Refills: 0 | Status: SHIPPED | OUTPATIENT
Start: 2023-09-05 | End: 2023-10-04

## 2023-09-20 ENCOUNTER — OFFICE VISIT (OUTPATIENT)
Dept: URGENT CARE | Facility: CLINIC | Age: 78
End: 2023-09-20
Payer: MEDICARE

## 2023-09-20 VITALS
TEMPERATURE: 98 F | DIASTOLIC BLOOD PRESSURE: 69 MMHG | WEIGHT: 110 LBS | HEART RATE: 58 BPM | HEIGHT: 61 IN | OXYGEN SATURATION: 97 % | SYSTOLIC BLOOD PRESSURE: 102 MMHG | BODY MASS INDEX: 20.77 KG/M2 | RESPIRATION RATE: 18 BRPM

## 2023-09-20 DIAGNOSIS — H10.32 ACUTE BACTERIAL CONJUNCTIVITIS OF LEFT EYE: Primary | ICD-10-CM

## 2023-09-20 PROCEDURE — 99213 OFFICE O/P EST LOW 20 MIN: CPT | Mod: S$GLB,,, | Performed by: NURSE PRACTITIONER

## 2023-09-20 PROCEDURE — 99213 PR OFFICE/OUTPT VISIT, EST, LEVL III, 20-29 MIN: ICD-10-PCS | Mod: S$GLB,,, | Performed by: NURSE PRACTITIONER

## 2023-09-20 RX ORDER — MOXIFLOXACIN 5 MG/ML
1 SOLUTION/ DROPS OPHTHALMIC 3 TIMES DAILY
Qty: 3 ML | Refills: 0 | Status: SHIPPED | OUTPATIENT
Start: 2023-09-20 | End: 2023-09-27

## 2023-09-20 NOTE — PROGRESS NOTES
"Subjective:      Patient ID: Luis Miguel Murcia is a 78 y.o. female.    Vitals:  height is 5' 1" (1.549 m) and weight is 49.9 kg (110 lb). Her tympanic temperature is 98.2 °F (36.8 °C). Her blood pressure is 102/69 and her pulse is 58 (abnormal). Her respiration is 18 and oxygen saturation is 97%.     Chief Complaint: Eye Problem    Pt c/o eye redness, irritation, photophobia, lacrimation and discharge x 4 days. Has used warm compress and alcohol pads with no relief. PS 4/10    Conjunctivitis  This is a new problem. The current episode started in the past 7 days. The problem has been gradually worsening.     Eyes:  Positive for eye discharge and eye redness. Negative for eye trauma, foreign body in eye, eye itching, eye pain, photophobia, vision loss, double vision, blurred vision and eyelid swelling.      Objective:     Physical Exam   Constitutional: She is oriented to person, place, and time. She appears well-developed.   HENT:   Head: Normocephalic and atraumatic.   Ears:   Right Ear: External ear normal.   Left Ear: External ear normal.   Nose: Nose normal.   Mouth/Throat: Oropharynx is clear and moist.   Eyes: EOM and lids are normal. Pupils are equal, round, and reactive to light. Left eye exhibits discharge. Left conjunctiva is injected. Extraocular movement intact   Neck: Trachea normal and phonation normal. Neck supple.   Musculoskeletal: Normal range of motion.         General: Normal range of motion.   Neurological: She is alert and oriented to person, place, and time.   Skin: Skin is warm, dry and intact.   Psychiatric: Her speech is normal and behavior is normal. Judgment and thought content normal.   Nursing note and vitals reviewed.      Assessment:     1. Acute bacterial conjunctivitis of left eye        Plan:       Acute bacterial conjunctivitis of left eye  -     moxifloxacin (VIGAMOX) 0.5 % ophthalmic solution; Place 1 drop into the left eye 3 (three) times daily. for 7 days  Dispense: 3 mL; " Refill: 0      Patient Instructions   You must understand that you've received an Urgent Care treatment only and that you may be released before all your medical problems are known or treated. You, the patient, will arrange for follow up care as instructed.  Follow up with your PCP or specialty clinic as directed in the next 1-2 weeks if not improved or as needed.  You can call [phone] to schedule an appointment with the appropriate provider.  If your condition worsens we recommend that you receive another evaluation at the emergency room immediately or contact your primary medical clinics after hours call service to discuss your concerns.  Please return here or go to the Emergency Department for any concerns or worsening of condition.

## 2023-09-20 NOTE — PATIENT INSTRUCTIONS

## 2023-09-27 DIAGNOSIS — G47.00 INSOMNIA, UNSPECIFIED TYPE: ICD-10-CM

## 2023-09-28 RX ORDER — ZOLPIDEM TARTRATE 10 MG/1
10 TABLET ORAL NIGHTLY
Qty: 30 TABLET | Refills: 1 | Status: ON HOLD | OUTPATIENT
Start: 2023-09-28 | End: 2023-11-14 | Stop reason: HOSPADM

## 2023-10-04 DIAGNOSIS — F41.9 ANXIETY: ICD-10-CM

## 2023-10-04 RX ORDER — ALPRAZOLAM 0.25 MG/1
TABLET ORAL
Qty: 60 TABLET | Refills: 0 | Status: SHIPPED | OUTPATIENT
Start: 2023-10-04 | End: 2023-11-03 | Stop reason: SDUPTHER

## 2023-10-29 DIAGNOSIS — F41.9 ANXIETY: ICD-10-CM

## 2023-10-30 RX ORDER — ALPRAZOLAM 0.25 MG/1
TABLET ORAL
Qty: 60 TABLET | OUTPATIENT
Start: 2023-10-30

## 2023-10-30 NOTE — TELEPHONE ENCOUNTER
Refill Routing Note   Medication(s) are not appropriate for processing by [facility] for the following reason(s):      Medication outside of protocol  Responsible provider unclear    [facility] action(s):  Route Care Due:  None identified            Appointments  past 12m or future 3m with PCP    Date Provider   Last Visit   Visit date not found Terence Phillip MD   Next Visit   1/9/2024 Terence Phillip MD   ED visits in past 90 days: 0        Note composed:3:48 AM 10/30/2023

## 2023-10-30 NOTE — TELEPHONE ENCOUNTER
Refill Routing Note   Medication(s) are not appropriate for processing by [facility] for the following reason(s):      Medication outside of protocol  Responsible provider unclear    [facility] action(s):  Route Care Due:  None identified            Appointments  past 12m or future 3m with PCP    Date Provider   Last Visit   Visit date not found Terence Phillip MD   Next Visit   10/29/2023 Terence Phillip MD   ED visits in past 90 days: 0        Note composed:3:38 AM 10/30/2023          
15-Dec-2017 08:11
15-Dec-2017 12:05

## 2023-11-02 ENCOUNTER — OFFICE VISIT (OUTPATIENT)
Dept: URGENT CARE | Facility: CLINIC | Age: 78
End: 2023-11-02
Payer: MEDICARE

## 2023-11-02 VITALS
BODY MASS INDEX: 20.77 KG/M2 | TEMPERATURE: 98 F | RESPIRATION RATE: 16 BRPM | DIASTOLIC BLOOD PRESSURE: 75 MMHG | HEIGHT: 61 IN | WEIGHT: 110 LBS | SYSTOLIC BLOOD PRESSURE: 127 MMHG | HEART RATE: 64 BPM | OXYGEN SATURATION: 99 %

## 2023-11-02 DIAGNOSIS — B02.9 HERPES ZOSTER WITHOUT COMPLICATION: Primary | ICD-10-CM

## 2023-11-02 PROCEDURE — 99213 OFFICE O/P EST LOW 20 MIN: CPT | Mod: S$GLB,,, | Performed by: PHYSICIAN ASSISTANT

## 2023-11-02 PROCEDURE — 99213 PR OFFICE/OUTPT VISIT, EST, LEVL III, 20-29 MIN: ICD-10-PCS | Mod: S$GLB,,, | Performed by: PHYSICIAN ASSISTANT

## 2023-11-02 RX ORDER — VALACYCLOVIR HYDROCHLORIDE 1 G/1
1000 TABLET, FILM COATED ORAL 3 TIMES DAILY
Qty: 30 TABLET | Refills: 0 | Status: SHIPPED | OUTPATIENT
Start: 2023-11-02 | End: 2023-12-04

## 2023-11-02 NOTE — PROGRESS NOTES
"Subjective:      Patient ID: Luis Miguel Murcia is a 78 y.o. female.    Vitals:  height is 5' 1" (1.549 m) and weight is 49.9 kg (110 lb). Her oral temperature is 98 °F (36.7 °C). Her blood pressure is 127/75 and her pulse is 64. Her respiration is 16 and oxygen saturation is 99%.     Chief Complaint: Rash    Pt presents to urgent care with rash that goes underneath the right breast and continues to her back. She thinks it may be shingles.    Rash  This is a new problem. The current episode started in the past 7 days. The problem is unchanged. The affected locations include the chest and back. Past treatments include nothing. The treatment provided no relief.       Skin:  Positive for rash.      Objective:     Physical Exam   Constitutional: She does not appear ill. No distress.   HENT:   Head: Normocephalic and atraumatic.   Ears:   Right Ear: External ear normal.   Left Ear: External ear normal.   Eyes: Conjunctivae are normal. Right eye exhibits no discharge. Left eye exhibits no discharge. Extraocular movement intact   Cardiovascular: Normal rate, regular rhythm and normal heart sounds.   No murmur heard.  Pulmonary/Chest: Effort normal. No respiratory distress.   Abdominal: Normal appearance.   Musculoskeletal: Normal range of motion.         General: Normal range of motion.   Neurological: no focal deficit. She is alert.   Skin: Skin is warm, dry, not pale and rash (Erythematous and vesicular from mid back laterally around right side to front breast.). jaundice  Psychiatric: Her behavior is normal. Mood, judgment and thought content normal.   Nursing note and vitals reviewed.chaperone present (Melissa Ravi)         Assessment:     1. Herpes zoster without complication        Plan:       Herpes zoster without complication    Other orders  -     valACYclovir (VALTREX) 1000 MG tablet; Take 1 tablet (1,000 mg total) by mouth 3 (three) times daily. for 10 days  Dispense: 30 tablet; Refill: 0      Shingles "   About this topic   Shingles or herpes zoster is often a painful skin rash. It is caused by a germ called varicella zoster virus.  The shingles virus infects a nerve and the skin that the nerve supplies. It most often affects only one side of the body. The face, around the eyes, and the forehead are often involved.  The pain, itching, and rash make people feel uncomfortable. First, you feel pain and then a rash appears as fluid-filled blisters. The fluid in the blisters can infect other people who have never had chicken pox or a vaccine. Those people will develop chicken pox, not shingles. It is important to keep the blisters covered until they dry, heal, and scabs form. Once the blisters dry, you cannot pass the infection to anyone. It may take 3 to 4 weeks for the scabs to fully heal.     What are the causes?   Shingles is caused by the same virus that causes chickenpox. After you have chickenpox, this virus stays inside your body. It may not cause problems for many years. As you get older, and your body's defense system weakens, the virus may reappear as shingles.  What can make this more likely to happen?   People with a weakened immune system are more likely to have shingles. This includes adults older than 60 years of age, people with HIV, and cancer patients. People who take drugs like steroids that weaken the body's defenses are also more likely to get shingles.  What are the main signs?   Very bad pain on one side of the face or body  Itchy rash  Blisters  Fever  Chills  How does the doctor diagnose this health problem?   Your doctor will do an exam and take your history. The doctor will look at the part of your body where the pain or rash is. Your doctor may touch the painful part, if needed. The doctor may look at your eyes with a special microscope if the pain or blister is near your eye.   The doctor may order:  Tissue culture taken from the blisters  Slit lamp exam to look at the eyes  How does the  doctor treat this health problem?   Take all drugs as ordered by your doctor. Apply creams or ointments as instructed. Some may be antiviral drugs. Others may be over the counter drugs ordered for your comfort.  Stay away from sour, spicy, and acidic foods like oranges and yesi that can make pain worse if you have blisters in your mouth.  Your doctor may cover your blisters with a clean bandage. The doctor will tell you when and how to change the dressing.  Your doctor will suggest you put cold or wet compress on the itchy part.  If the shingles affects your eye, the doctor may cover your eye with a bandage.  Are there other health problems to treat?   Infection of the eye and the skin around the eye  Very bad, long-term pain after the rash goes away  Ear infection  Skin infection  Weakness  What drugs may be needed?   The doctor may order drugs to:  Help with pain  Ease itching  Fight the virus  Keep your eye moist  Numb the affected part  What problems could happen?   Very bad pain that lasts after the rash goes away  Some other infection  Eye problems  Hearing problems  What can be done to prevent this health problem?   Do not share towels, go swimming, or play contact sports with people who have shingles if you never had chickenpox.  Ask your doctor about getting a shingles shot.  If you are pregnant, stay away from anyone who has shingles until the scabs are gone. The virus might harm your unborn baby.  Helpful tips   Do not touch or scratch your rashes. If you do, be sure to wash your hands afterwards.  Shower when your doctor tells you to. You may use mild soap and water to wash your blisters. Pat your skin dry with a clean, dry towel. Do not rub. Make sure not to scratch your blisters when drying yourself.  Avoid cleaning your ears if your rashes or blisters are near the ears. You may scratch a blister inside your ear and make your condition worse.  Wear loose-fitting clothes.  Wear sunglasses when you go  out to cover the affected eye and to keep it moist.  Stay away from pregnant women, infants, cancer patients, and people with low immune defenses.  If you see a rash or think that a painful part on your body may be shingles, see you doctor right away. Early treatment may shorten the length and severity of the illness.  Where can I learn more?   American Academy of Dermatology  https://www.aad.org/public/diseases/a-z/shingles-overview   Center for Disease Control and Prevention  http://www.cdc.gov/shingles/about/overview.html   FamilyDoctor.org  http://familydoctor.org/familydoctor/en/diseases-conditions/shingles/treatment.html   Last Reviewed Date   2020-10-13  Consumer Information Use and Disclaimer   This information is not specific medical advice and does not replace information you receive from your health care provider. This is only a brief summary of general information. It does NOT include all information about conditions, illnesses, injuries, tests, procedures, treatments, therapies, discharge instructions or life-style choices that may apply to you. You must talk with your health care provider for complete information about your health and treatment options. This information should not be used to decide whether or not to accept your health care providers advice, instructions or recommendations. Only your health care provider has the knowledge and training to provide advice that is right for you.  Copyright   Copyright © 2021 UpToDate, Inc. and its affiliates and/or licensors. All rights reserved.

## 2023-11-03 DIAGNOSIS — F41.9 ANXIETY: ICD-10-CM

## 2023-11-03 RX ORDER — ALPRAZOLAM 0.25 MG/1
0.25 TABLET ORAL 2 TIMES DAILY PRN
Qty: 60 TABLET | Refills: 2 | Status: ON HOLD | OUTPATIENT
Start: 2023-11-03 | End: 2023-11-14 | Stop reason: HOSPADM

## 2023-11-03 NOTE — TELEPHONE ENCOUNTER
----- Message from Lainey Turner sent at 11/3/2023  1:16 PM CDT -----  Regarding: RX Refill Request  Contact: patient at [phone]  Type:  RX Refill Request    Who Called:  patient at [phone]    Preferred Pharmacy with phone number:    [id number] - [place] [address] [address] [address]  Phone: [phone] Fax: [phone]    Additional Information:  patient is out of medication and is requesting a refill. Please call and advise. Thank you    ALPRAZolam (XANAX) 0.25 MG tablet 60 tablet 0 10/4/2023 -   Sig: TAKE 1 TABLET(0.25 MG) BY MOUTH TWICE DAILY AS NEEDED FOR ANXIETY   Sent to pharmacy as: ALPRAZolam (XANAX) 0.25 MG tablet   E-Prescribing Status: Receipt confirmed by pharmacy (10/4/2023  1:18 PM CDT)

## 2023-11-03 NOTE — TELEPHONE ENCOUNTER
No care due was identified.  [facility] Embedded Care Due Messages. Reference number: [id number].   11/03/2023 2:37:11 PM CDT

## 2023-11-09 PROBLEM — E83.51 HYPOCALCEMIA: Status: ACTIVE | Noted: 2023-11-09

## 2023-11-09 PROBLEM — N18.9 ACUTE KIDNEY INJURY SUPERIMPOSED ON CHRONIC KIDNEY DISEASE: Status: ACTIVE | Noted: 2023-11-09

## 2023-11-09 PROBLEM — R33.9 URINARY RETENTION: Status: ACTIVE | Noted: 2023-11-09

## 2023-11-09 PROBLEM — B02.9 SHINGLES: Status: ACTIVE | Noted: 2023-11-09

## 2023-11-09 PROBLEM — Z71.89 ACP (ADVANCE CARE PLANNING): Status: ACTIVE | Noted: 2017-08-18

## 2023-11-09 PROBLEM — N17.9 ACUTE KIDNEY INJURY SUPERIMPOSED ON CHRONIC KIDNEY DISEASE: Status: ACTIVE | Noted: 2023-11-09

## 2023-11-09 PROBLEM — E87.20 METABOLIC ACIDOSIS: Status: ACTIVE | Noted: 2023-11-09

## 2023-11-09 PROBLEM — G92.8 TOXIC METABOLIC ENCEPHALOPATHY: Status: ACTIVE | Noted: 2023-11-09

## 2023-11-09 PROBLEM — E87.1 HYPONATREMIA: Status: ACTIVE | Noted: 2023-11-09

## 2023-11-09 PROBLEM — E86.0 DEHYDRATION: Status: ACTIVE | Noted: 2023-11-09

## 2023-11-10 PROBLEM — E83.51 HYPOCALCEMIA: Status: RESOLVED | Noted: 2023-11-09 | Resolved: 2023-11-10

## 2023-11-14 ENCOUNTER — TELEPHONE (OUTPATIENT)
Dept: FAMILY MEDICINE | Facility: CLINIC | Age: 78
End: 2023-11-14
Payer: MEDICARE

## 2023-11-14 NOTE — TELEPHONE ENCOUNTER
----- Message from Natalie Watson sent at 11/14/2023  2:13 PM CST -----  Contact: self  Type:  Sooner Appointment Request    Caller is requesting a sooner appointment.  Caller declined first available appointment listed below.  Caller will not accept being placed on the waitlist and is requesting a message be sent to doctor.    Name of Caller:  pt  When is the first available appointment?  N/a  Symptoms:  hospial follow up  Would the patient rather a call back or a response via [facility]? call  Best Call Back Number:  [phone]   Additional Information:  would like to be seen within 10 days /pt will be having home health

## 2023-11-24 ENCOUNTER — TELEPHONE (OUTPATIENT)
Dept: FAMILY MEDICINE | Facility: CLINIC | Age: 78
End: 2023-11-24
Payer: MEDICARE

## 2023-11-24 NOTE — TELEPHONE ENCOUNTER
----- Message from Natalie Watson sent at 11/24/2023  2:17 PM CST -----  Contact: melissa  Type: Needs Medical Advice  Who Called:  jesusita davenport care provider    Best Call Back Number: [phone]  Additional Information: please call melissa took a leave of absence to take care of ronnie and will be faxing some papers fpr your office to return.please call to discuss

## 2023-11-29 ENCOUNTER — TELEPHONE (OUTPATIENT)
Dept: FAMILY MEDICINE | Facility: CLINIC | Age: 78
End: 2023-11-29
Payer: MEDICARE

## 2023-11-29 NOTE — TELEPHONE ENCOUNTER
----- Message from Xenia English sent at 11/29/2023  9:56 AM CST -----  Type:  Appointment Request    Caller is requesting an appointment.      Name of Caller:  Pt    Symptoms:  hospital f/u    Would the patient rather a call back or a response via [employer]?  Call back    Best Call Back Number:  shawn Kenny [phone]    Additional Information:  Pt was not able to make appt 11/24 due to being in another hospital visit.  Please call back to advise. Thanks!

## 2023-12-04 ENCOUNTER — OFFICE VISIT (OUTPATIENT)
Dept: FAMILY MEDICINE | Facility: CLINIC | Age: 78
End: 2023-12-04
Payer: MEDICARE

## 2023-12-04 VITALS
SYSTOLIC BLOOD PRESSURE: 104 MMHG | WEIGHT: 107.81 LBS | DIASTOLIC BLOOD PRESSURE: 62 MMHG | BODY MASS INDEX: 18.5 KG/M2 | HEART RATE: 64 BPM | OXYGEN SATURATION: 97 %

## 2023-12-04 DIAGNOSIS — F19.982 DRUG-INDUCED INSOMNIA: Primary | ICD-10-CM

## 2023-12-04 DIAGNOSIS — F41.9 ANXIETY: ICD-10-CM

## 2023-12-04 DIAGNOSIS — N18.31 CHRONIC KIDNEY DISEASE, STAGE 3A: ICD-10-CM

## 2023-12-04 DIAGNOSIS — M85.89 OSTEOPENIA OF MULTIPLE SITES: ICD-10-CM

## 2023-12-04 DIAGNOSIS — M51.36 DDD (DEGENERATIVE DISC DISEASE), LUMBAR: ICD-10-CM

## 2023-12-04 PROCEDURE — 1101F PR PT FALLS ASSESS DOC 0-1 FALLS W/OUT INJ PAST YR: ICD-10-PCS | Mod: CPTII,S$GLB,, | Performed by: STUDENT IN AN ORGANIZED HEALTH CARE EDUCATION/TRAINING PROGRAM

## 2023-12-04 PROCEDURE — 1111F DSCHRG MED/CURRENT MED MERGE: CPT | Mod: CPTII,S$GLB,, | Performed by: STUDENT IN AN ORGANIZED HEALTH CARE EDUCATION/TRAINING PROGRAM

## 2023-12-04 PROCEDURE — 3078F PR MOST RECENT DIASTOLIC BLOOD PRESSURE < 80 MM HG: ICD-10-PCS | Mod: CPTII,S$GLB,, | Performed by: STUDENT IN AN ORGANIZED HEALTH CARE EDUCATION/TRAINING PROGRAM

## 2023-12-04 PROCEDURE — 99999 PR PBB SHADOW E&M-EST. PATIENT-LVL IV: ICD-10-PCS | Mod: PBBFAC,,, | Performed by: STUDENT IN AN ORGANIZED HEALTH CARE EDUCATION/TRAINING PROGRAM

## 2023-12-04 PROCEDURE — 1101F PT FALLS ASSESS-DOCD LE1/YR: CPT | Mod: CPTII,S$GLB,, | Performed by: STUDENT IN AN ORGANIZED HEALTH CARE EDUCATION/TRAINING PROGRAM

## 2023-12-04 PROCEDURE — 1159F MED LIST DOCD IN RCRD: CPT | Mod: CPTII,S$GLB,, | Performed by: STUDENT IN AN ORGANIZED HEALTH CARE EDUCATION/TRAINING PROGRAM

## 2023-12-04 PROCEDURE — 1159F PR MEDICATION LIST DOCUMENTED IN MEDICAL RECORD: ICD-10-PCS | Mod: CPTII,S$GLB,, | Performed by: STUDENT IN AN ORGANIZED HEALTH CARE EDUCATION/TRAINING PROGRAM

## 2023-12-04 PROCEDURE — 3288F PR FALLS RISK ASSESSMENT DOCUMENTED: ICD-10-PCS | Mod: CPTII,S$GLB,, | Performed by: STUDENT IN AN ORGANIZED HEALTH CARE EDUCATION/TRAINING PROGRAM

## 2023-12-04 PROCEDURE — 3074F SYST BP LT 130 MM HG: CPT | Mod: CPTII,S$GLB,, | Performed by: STUDENT IN AN ORGANIZED HEALTH CARE EDUCATION/TRAINING PROGRAM

## 2023-12-04 PROCEDURE — 3288F FALL RISK ASSESSMENT DOCD: CPT | Mod: CPTII,S$GLB,, | Performed by: STUDENT IN AN ORGANIZED HEALTH CARE EDUCATION/TRAINING PROGRAM

## 2023-12-04 PROCEDURE — 3078F DIAST BP <80 MM HG: CPT | Mod: CPTII,S$GLB,, | Performed by: STUDENT IN AN ORGANIZED HEALTH CARE EDUCATION/TRAINING PROGRAM

## 2023-12-04 PROCEDURE — 99999 PR PBB SHADOW E&M-EST. PATIENT-LVL IV: CPT | Mod: PBBFAC,,, | Performed by: STUDENT IN AN ORGANIZED HEALTH CARE EDUCATION/TRAINING PROGRAM

## 2023-12-04 PROCEDURE — 3074F PR MOST RECENT SYSTOLIC BLOOD PRESSURE < 130 MM HG: ICD-10-PCS | Mod: CPTII,S$GLB,, | Performed by: STUDENT IN AN ORGANIZED HEALTH CARE EDUCATION/TRAINING PROGRAM

## 2023-12-04 PROCEDURE — 99214 OFFICE O/P EST MOD 30 MIN: CPT | Mod: S$GLB,,, | Performed by: STUDENT IN AN ORGANIZED HEALTH CARE EDUCATION/TRAINING PROGRAM

## 2023-12-04 PROCEDURE — 99214 PR OFFICE/OUTPT VISIT, EST, LEVL IV, 30-39 MIN: ICD-10-PCS | Mod: S$GLB,,, | Performed by: STUDENT IN AN ORGANIZED HEALTH CARE EDUCATION/TRAINING PROGRAM

## 2023-12-04 PROCEDURE — 1111F PR DISCHARGE MEDS RECONCILED W/ CURRENT OUTPATIENT MED LIST: ICD-10-PCS | Mod: CPTII,S$GLB,, | Performed by: STUDENT IN AN ORGANIZED HEALTH CARE EDUCATION/TRAINING PROGRAM

## 2023-12-04 RX ORDER — ZOLPIDEM TARTRATE 10 MG/1
10 TABLET ORAL NIGHTLY
Qty: 30 TABLET | Refills: 5 | Status: SHIPPED | OUTPATIENT
Start: 2023-12-04 | End: 2024-06-03

## 2023-12-04 RX ORDER — IBANDRONATE SODIUM 150 MG/1
TABLET, FILM COATED ORAL
Qty: 3 TABLET | Refills: 3 | Status: SHIPPED | OUTPATIENT
Start: 2023-12-04 | End: 2024-02-29

## 2023-12-04 NOTE — ASSESSMENT & PLAN NOTE
Patient did not tolerate Lexapro from her last visit with Sary Vgea - medication made her somnolent. She is not taking alprazolam. For now, she would like to hold off any treatment. We can consider SNRI therapy next, particularly duloxetine as it may also help with chronic pains.

## 2023-12-04 NOTE — ASSESSMENT & PLAN NOTE
Currently sees Luis Sanderson for pain management. She is trying to avoid using Percocet 10mg as she feels it too strong.

## 2023-12-04 NOTE — ASSESSMENT & PLAN NOTE
"A long-standing issue. I am calling it "drug-induced" until we can wean off zolpidem. I mentioned my concern about zolpidem's habit-forming nature, dependence, and propensity for side effects as we age. Continue for now. I have provided some behavioral resources for insomnia in her After Visit Summary. Will work on a taper at her next visit.  "

## 2023-12-04 NOTE — PATIENT INSTRUCTIONS
"For more information on insomnia and how to treat it, you can visit this web page provided by the American Academy of Sleep Medicine:  https://sleepeducation.org/sleep-disorders/insomnia/    To improve your sleep without medication, you can read "Say Good Night to Insomnia: The Six Week, Drug-Free Program Developed at [facility]" by Armand Eaton. This book's methods are based on cognitive behavioral therapy, the first line treatment for insomnia.  https://www.[employer]/Say-Good-Night-Insomnia-Drug-Free/dp/0678090413    If you would like a smart phone jacinto that simulates cognitive behavioral therapy, you can download [employer].  https://www.hovelstay.[employer]/    "
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
## 2023-12-04 NOTE — ASSESSMENT & PLAN NOTE
Currently taking Boniva - there seemed to be some confusion about what she was taking and may not have filled it of late. Refills sent.

## 2023-12-05 ENCOUNTER — TELEPHONE (OUTPATIENT)
Dept: FAMILY MEDICINE | Facility: CLINIC | Age: 78
End: 2023-12-05
Payer: MEDICARE

## 2023-12-05 NOTE — TELEPHONE ENCOUNTER
----- Message from Silva Gonzalez sent at 12/5/2023  1:44 PM CST -----  Regarding: refill  Type:  RX Refill Request    Who Called: Luis Miguel Putnam or New Rx:refill  RX Name and Strength:ZANAX  How is the patient currently taking it? (ex. 1XDay):  Is this a 30 day or 90 day RX:  Preferred Pharmacy with phone number:[facility] #[id number] [place] [address] [address]   Phone: [phone]  Fax: [phone]        Local or Mail Order:LOCAL  Ordering Provider:  Would the patient rather a call back or a response via [facility]? CALL BACK  Best Call Back Number:[phone]  Additional Information:

## 2023-12-11 RX ORDER — ALPRAZOLAM 0.25 MG/1
0.25 TABLET ORAL 2 TIMES DAILY PRN
Qty: 60 TABLET | Refills: 0 | Status: SHIPPED | OUTPATIENT
Start: 2023-12-11 | End: 2024-01-25 | Stop reason: SDUPTHER

## 2023-12-11 RX ORDER — DULOXETIN HYDROCHLORIDE 30 MG/1
CAPSULE, DELAYED RELEASE ORAL
Qty: 173 CAPSULE | Refills: 0 | Status: SHIPPED | OUTPATIENT
Start: 2023-12-11 | End: 2024-03-15

## 2023-12-11 NOTE — TELEPHONE ENCOUNTER
Called daughter to discuss. Patient stated that she was not taking Xanax at our visit. However, daughter notes that the patient was confused and currently does take Xanax. Given this, I will need to start the patient on a SNRI to reduce her need for/wean off the Xanax. Will send duloxetine. Follow up in 2 months to assess response.

## 2024-01-24 DIAGNOSIS — I10 ESSENTIAL HYPERTENSION: ICD-10-CM

## 2024-01-24 RX ORDER — AMLODIPINE BESYLATE 5 MG/1
5 TABLET ORAL DAILY
Qty: 90 TABLET | Refills: 3 | Status: SHIPPED | OUTPATIENT
Start: 2024-01-24 | End: 2024-06-12

## 2024-01-24 NOTE — TELEPHONE ENCOUNTER
No care due was identified.  [facility] Embedded Care Due Messages. Reference number: [id number].   1/24/2024 3:07:29 PM CST

## 2024-01-25 ENCOUNTER — TELEPHONE (OUTPATIENT)
Dept: FAMILY MEDICINE | Facility: CLINIC | Age: 79
End: 2024-01-25
Payer: MEDICARE

## 2024-01-25 DIAGNOSIS — F41.9 ANXIETY: Primary | ICD-10-CM

## 2024-01-25 RX ORDER — ALPRAZOLAM 0.25 MG/1
0.25 TABLET ORAL 2 TIMES DAILY PRN
Qty: 60 TABLET | Refills: 2 | Status: SHIPPED | OUTPATIENT
Start: 2024-01-25 | End: 2024-04-29

## 2024-01-25 NOTE — TELEPHONE ENCOUNTER
----- Message from Cleveland Hanson sent at 1/25/2024 12:36 PM CST -----  Contact: Self  Type: Needs Medical Advice  Who Called:  PT  DULoxetine (CYMBALTA) 30 MG capsule is making pt lethargic , depressed, no appetite etc, she is dizzy  Pharmacy name and phone #:    [facility] #[id number] - [place] [address] [address] [facility] [address]  [facility] [address] [place] [address]  Phone: [phone] Fax: [phone]             Best Call Back Number: [phone] (home)     Additional Information: Please call to discuss

## 2024-01-25 NOTE — TELEPHONE ENCOUNTER
Spoke to pt. She stopped taking Cymbalta on Monday. She took it for 4 days and made her feel down, depressed and tired. She feels much better know and would like to know if she can be prescribed xanax instead because that helped her a lot before.

## 2024-02-12 PROBLEM — N18.9 ACUTE KIDNEY INJURY SUPERIMPOSED ON CHRONIC KIDNEY DISEASE: Status: RESOLVED | Noted: 2023-11-09 | Resolved: 2024-02-12

## 2024-02-12 PROBLEM — N17.9 ACUTE KIDNEY INJURY SUPERIMPOSED ON CHRONIC KIDNEY DISEASE: Status: RESOLVED | Noted: 2023-11-09 | Resolved: 2024-02-12

## 2024-02-18 DIAGNOSIS — K21.9 LPRD (LARYNGOPHARYNGEAL REFLUX DISEASE): ICD-10-CM

## 2024-02-19 RX ORDER — DEXLANSOPRAZOLE 60 MG/1
CAPSULE, DELAYED RELEASE ORAL
Qty: 90 CAPSULE | Refills: 1 | Status: SHIPPED | OUTPATIENT
Start: 2024-02-19

## 2024-02-19 NOTE — TELEPHONE ENCOUNTER
Refill Routing Note   Medication(s) are not appropriate for processing by [facility] for the following reason(s):        Outside of protocol: dosage outside [facility] protocol    [facility] action(s):  Route               Appointments  past 12m or future 3m with PCP    Date Provider   Last Visit   12/4/2023 Terence Phillip MD   Next Visit   2/19/2024 Terence Phillip MD   ED visits in past 90 days: 1        Note composed:10:11 AM 02/19/2024

## 2024-02-19 NOTE — TELEPHONE ENCOUNTER
No care due was identified.  [facility] Embedded Care Due Messages. Reference number: [id number].   2/19/2024 9:58:25 AM CST

## 2024-02-21 RX ORDER — GABAPENTIN 600 MG/1
TABLET ORAL
Qty: 30 TABLET | Refills: 2 | Status: SHIPPED | OUTPATIENT
Start: 2024-02-21 | End: 2024-05-21

## 2024-02-21 NOTE — TELEPHONE ENCOUNTER
----- Message from Nichole Jansen sent at 2/21/2024 10:52 AM CST -----  Regarding: pt called  Name of Who is Calling: JUAN FRANCISCO CHOWDHURY [[id number]] Campos ( daughter)       What is the request in detail: pt is requesting a call back from the office  about getting her gabapentin (NEURONTIN) 600 MG tablet refilled          [id number] - [place] - [address] AT [facility] & [address] [address]  Phone: [phone] Fax: [phone]          Can the clinic reply by [facility]: No      What Number to Call Back if not in [facility]: Telephone Information:        [phone]

## 2024-02-21 NOTE — TELEPHONE ENCOUNTER
No care due was identified.  [facility] Embedded Care Due Messages. Reference number: [id number].   2/21/2024 11:03:28 AM CST   hide

## 2024-02-28 ENCOUNTER — TELEPHONE (OUTPATIENT)
Dept: FAMILY MEDICINE | Facility: CLINIC | Age: 79
End: 2024-02-28
Payer: MEDICARE

## 2024-02-28 NOTE — TELEPHONE ENCOUNTER
----- Message from Rosemary Monteiro sent at 2/28/2024 11:25 AM CST -----  Type:  Patient Returning Call    Who Called:  pt  Who Left Message for Patient:  Unknown  Does the patient know what this is regarding?:  no  Best Call Back Number:  [phone]  Additional Information:  Please call back to advise. Thanks!

## 2024-02-29 DIAGNOSIS — M85.89 OSTEOPENIA OF MULTIPLE SITES: ICD-10-CM

## 2024-02-29 DIAGNOSIS — K21.9 LPRD (LARYNGOPHARYNGEAL REFLUX DISEASE): ICD-10-CM

## 2024-02-29 RX ORDER — DEXLANSOPRAZOLE 60 MG/1
CAPSULE, DELAYED RELEASE ORAL
Qty: 90 CAPSULE | Refills: 1 | OUTPATIENT
Start: 2024-02-29

## 2024-02-29 RX ORDER — IBANDRONATE SODIUM 150 MG/1
TABLET, FILM COATED ORAL
Qty: 3 TABLET | Refills: 3 | Status: SHIPPED | OUTPATIENT
Start: 2024-02-29

## 2024-02-29 NOTE — TELEPHONE ENCOUNTER
Refill Routing Note   Medication(s) are not appropriate for processing by [facility] for the following reason(s):        Required labs abnormal    [facility] action(s):  Defer  Quick Discontinue               Appointments  past 12m or future 3m with PCP    Date Provider   Last Visit   12/4/2023 Terence Phillip MD   Next Visit   6/4/2024 Terence Phillip MD   ED visits in past 90 days: 0        Note composed:1:08 PM 02/29/2024

## 2024-02-29 NOTE — TELEPHONE ENCOUNTER
No care due was identified.  [facility] Embedded Care Due Messages. Reference number: [id number].   2/29/2024 9:51:42 AM CST

## 2024-03-05 DIAGNOSIS — I10 ESSENTIAL HYPERTENSION: ICD-10-CM

## 2024-03-05 RX ORDER — CARVEDILOL 12.5 MG/1
12.5 TABLET ORAL 2 TIMES DAILY
Qty: 180 TABLET | Refills: 2 | Status: SHIPPED | OUTPATIENT
Start: 2024-03-05 | End: 2024-06-04

## 2024-03-05 NOTE — TELEPHONE ENCOUNTER
Refill Routing Note   Medication(s) are not appropriate for processing by [facility] for the following reason(s):        No active prescription written by provider    [facility] action(s):  Defer        Medication Therapy Plan: The provider responsible for managing the medication isnt PCP      Appointments  past 12m or future 3m with PCP    Date Provider   Last Visit   12/4/2023 Terence Phillip MD   Next Visit   6/4/2024 Terence Phillip MD   ED visits in past 90 days: 0        Note composed:12:59 PM 03/05/2024

## 2024-03-05 NOTE — TELEPHONE ENCOUNTER
No care due was identified.  [facility] Embedded Care Due Messages. Reference number: [id number].   3/05/2024 12:00:40 PM CST

## 2024-03-11 ENCOUNTER — PATIENT MESSAGE (OUTPATIENT)
Dept: OBSTETRICS AND GYNECOLOGY | Facility: CLINIC | Age: 79
End: 2024-03-11
Payer: MEDICARE

## 2024-03-14 NOTE — TELEPHONE ENCOUNTER
Refill Routing Note   Medication(s) are not appropriate for processing by [facility] for the following reason(s):        Other: adjusted directions & strength to reflect current dose and frequency for provider's review     [facility] action(s):  Defer               Appointments  past 12m or future 3m with PCP    Date Provider   Last Visit   12/4/2023 Terence Phillip MD   Next Visit   6/4/2024 Terence Phillip MD   ED visits in past 90 days: 0        Note composed:6:23 PM 03/14/2024

## 2024-03-15 RX ORDER — DULOXETIN HYDROCHLORIDE 60 MG/1
60 CAPSULE, DELAYED RELEASE ORAL DAILY
Qty: 90 CAPSULE | Refills: 2 | Status: SHIPPED | OUTPATIENT
Start: 2024-03-15 | End: 2024-03-21

## 2024-03-19 ENCOUNTER — TELEPHONE (OUTPATIENT)
Dept: OBSTETRICS AND GYNECOLOGY | Facility: CLINIC | Age: 79
End: 2024-03-19
Payer: MEDICARE

## 2024-03-19 NOTE — TELEPHONE ENCOUNTER
----- Message from Harriett Jozef sent at 3/19/2024  4:00 PM CDT -----  Contact: self  Type:  Needs Medical Advice    Who Called:self  Symptoms (please be specific): pt needs to make an appt with dr for possible prolapse. Pt had an appt with Dr. Ramirez but couldn't make it due to traffic.   Would the patient rather a call back or a response via [facility]? call  Best Call Back Number: [phone] (home)     Additional Information: please advise and thank you

## 2024-03-20 ENCOUNTER — OFFICE VISIT (OUTPATIENT)
Dept: OBSTETRICS AND GYNECOLOGY | Facility: CLINIC | Age: 79
End: 2024-03-20
Payer: MEDICARE

## 2024-03-20 ENCOUNTER — TELEPHONE (OUTPATIENT)
Dept: NEPHROLOGY | Facility: CLINIC | Age: 79
End: 2024-03-20
Payer: MEDICARE

## 2024-03-20 VITALS
WEIGHT: 111.75 LBS | HEIGHT: 64 IN | BODY MASS INDEX: 19.08 KG/M2 | DIASTOLIC BLOOD PRESSURE: 70 MMHG | SYSTOLIC BLOOD PRESSURE: 114 MMHG

## 2024-03-20 DIAGNOSIS — R10.2 FEMALE PELVIC PAIN: ICD-10-CM

## 2024-03-20 DIAGNOSIS — N81.9 VAGINAL VAULT PROLAPSE: Primary | ICD-10-CM

## 2024-03-20 DIAGNOSIS — N81.11 CYSTOCELE, MIDLINE: ICD-10-CM

## 2024-03-20 PROCEDURE — 99999 PR PBB SHADOW E&M-EST. PATIENT-LVL IV: CPT | Mod: PBBFAC,,, | Performed by: OBSTETRICS & GYNECOLOGY

## 2024-03-20 PROCEDURE — 1126F AMNT PAIN NOTED NONE PRSNT: CPT | Mod: CPTII,S$GLB,, | Performed by: OBSTETRICS & GYNECOLOGY

## 2024-03-20 PROCEDURE — 1159F MED LIST DOCD IN RCRD: CPT | Mod: CPTII,S$GLB,, | Performed by: OBSTETRICS & GYNECOLOGY

## 2024-03-20 PROCEDURE — 1101F PT FALLS ASSESS-DOCD LE1/YR: CPT | Mod: CPTII,S$GLB,, | Performed by: OBSTETRICS & GYNECOLOGY

## 2024-03-20 PROCEDURE — 3288F FALL RISK ASSESSMENT DOCD: CPT | Mod: CPTII,S$GLB,, | Performed by: OBSTETRICS & GYNECOLOGY

## 2024-03-20 PROCEDURE — 3078F DIAST BP <80 MM HG: CPT | Mod: CPTII,S$GLB,, | Performed by: OBSTETRICS & GYNECOLOGY

## 2024-03-20 PROCEDURE — 3074F SYST BP LT 130 MM HG: CPT | Mod: CPTII,S$GLB,, | Performed by: OBSTETRICS & GYNECOLOGY

## 2024-03-20 PROCEDURE — 99203 OFFICE O/P NEW LOW 30 MIN: CPT | Mod: S$GLB,,, | Performed by: OBSTETRICS & GYNECOLOGY

## 2024-03-20 NOTE — TELEPHONE ENCOUNTER
----- Message from Jamila Dinh sent at 3/20/2024  2:25 PM CDT -----  Contact: self  Type:  Needs Medical Advice  Best Call Back Number:  [phone]  Please call the pt for direction... Thank you...
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
## 2024-03-21 ENCOUNTER — OFFICE VISIT (OUTPATIENT)
Dept: NEPHROLOGY | Facility: CLINIC | Age: 79
End: 2024-03-21
Payer: MEDICARE

## 2024-03-21 VITALS — DIASTOLIC BLOOD PRESSURE: 62 MMHG | SYSTOLIC BLOOD PRESSURE: 118 MMHG | HEART RATE: 59 BPM | OXYGEN SATURATION: 99 %

## 2024-03-21 DIAGNOSIS — N18.31 CHRONIC KIDNEY DISEASE, STAGE 3A: Primary | ICD-10-CM

## 2024-03-21 DIAGNOSIS — E78.5 HYPERLIPIDEMIA, UNSPECIFIED HYPERLIPIDEMIA TYPE: ICD-10-CM

## 2024-03-21 DIAGNOSIS — I10 ESSENTIAL HYPERTENSION: ICD-10-CM

## 2024-03-21 PROCEDURE — 1159F MED LIST DOCD IN RCRD: CPT | Mod: CPTII,S$GLB,, | Performed by: STUDENT IN AN ORGANIZED HEALTH CARE EDUCATION/TRAINING PROGRAM

## 2024-03-21 PROCEDURE — 3288F FALL RISK ASSESSMENT DOCD: CPT | Mod: CPTII,S$GLB,, | Performed by: STUDENT IN AN ORGANIZED HEALTH CARE EDUCATION/TRAINING PROGRAM

## 2024-03-21 PROCEDURE — 99999 PR PBB SHADOW E&M-EST. PATIENT-LVL III: CPT | Mod: PBBFAC,,, | Performed by: STUDENT IN AN ORGANIZED HEALTH CARE EDUCATION/TRAINING PROGRAM

## 2024-03-21 PROCEDURE — 1101F PT FALLS ASSESS-DOCD LE1/YR: CPT | Mod: CPTII,S$GLB,, | Performed by: STUDENT IN AN ORGANIZED HEALTH CARE EDUCATION/TRAINING PROGRAM

## 2024-03-21 PROCEDURE — 3078F DIAST BP <80 MM HG: CPT | Mod: CPTII,S$GLB,, | Performed by: STUDENT IN AN ORGANIZED HEALTH CARE EDUCATION/TRAINING PROGRAM

## 2024-03-21 PROCEDURE — 99203 OFFICE O/P NEW LOW 30 MIN: CPT | Mod: S$GLB,,, | Performed by: STUDENT IN AN ORGANIZED HEALTH CARE EDUCATION/TRAINING PROGRAM

## 2024-03-21 PROCEDURE — 3074F SYST BP LT 130 MM HG: CPT | Mod: CPTII,S$GLB,, | Performed by: STUDENT IN AN ORGANIZED HEALTH CARE EDUCATION/TRAINING PROGRAM

## 2024-03-21 NOTE — PATIENT INSTRUCTIONS
I put together a guide for patients which includes some healthy tips from the [employer]:   Avoiding fluctuations in blood pressure (high and low spikes)  Maintain good glucose control if you have diabetes  Reduce/avoid extra salt intake  Avoid over the counter pain medications (NSAIDs)  Aerobic exercise at least 3x weekly as tolerated  Eat more whole foods (vegetables, fruit, fish, chicken, pork, beef, dairy) and less processed food  Control weight  Avoid smoking  Stay hydrated, avoid dehydration.  Annual flu shot and routine preventative cancer screening via your primary care doctor.     -Aston Dhillon MD
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
## 2024-04-03 DIAGNOSIS — E78.5 HYPERLIPIDEMIA, UNSPECIFIED HYPERLIPIDEMIA TYPE: Primary | ICD-10-CM

## 2024-04-03 NOTE — TELEPHONE ENCOUNTER
No care due was identified.  [facility] Embedded Care Due Messages. Reference number: [id number].   4/03/2024 4:04:13 PM CDT

## 2024-04-04 RX ORDER — PRAVASTATIN SODIUM 20 MG/1
20 TABLET ORAL NIGHTLY
Qty: 90 TABLET | Refills: 2 | Status: SHIPPED | OUTPATIENT
Start: 2024-04-04

## 2024-04-04 NOTE — TELEPHONE ENCOUNTER
Refill Routing Note   Medication(s) are not appropriate for processing by [facility] for the following reason(s):        Required labs outdated  No active prescription written by provider  Responsible provider unclear    [facility] action(s):  Defer             Appointments  past 12m or future 3m with PCP    Date Provider   Last Visit   12/4/2023 Terence Phillip MD   Next Visit   6/4/2024 Terence Phillip MD   ED visits in past 90 days: 0        Note composed:11:14 AM 04/04/2024

## 2024-04-16 ENCOUNTER — LAB VISIT (OUTPATIENT)
Dept: LAB | Facility: HOSPITAL | Age: 79
End: 2024-04-16
Attending: STUDENT IN AN ORGANIZED HEALTH CARE EDUCATION/TRAINING PROGRAM
Payer: MEDICARE

## 2024-04-16 ENCOUNTER — OFFICE VISIT (OUTPATIENT)
Dept: OBSTETRICS AND GYNECOLOGY | Facility: CLINIC | Age: 79
End: 2024-04-16
Payer: MEDICARE

## 2024-04-16 VITALS
WEIGHT: 108.69 LBS | BODY MASS INDEX: 18.66 KG/M2 | SYSTOLIC BLOOD PRESSURE: 110 MMHG | DIASTOLIC BLOOD PRESSURE: 64 MMHG

## 2024-04-16 DIAGNOSIS — N18.31 CHRONIC KIDNEY DISEASE, STAGE 3A: ICD-10-CM

## 2024-04-16 DIAGNOSIS — N32.89 OTHER SPECIFIED DISORDERS OF BLADDER: ICD-10-CM

## 2024-04-16 DIAGNOSIS — N99.3 VAGINAL VAULT PROLAPSE AFTER HYSTERECTOMY: Primary | ICD-10-CM

## 2024-04-16 DIAGNOSIS — R39.89 OTHER SYMPTOMS AND SIGNS INVOLVING THE GENITOURINARY SYSTEM: ICD-10-CM

## 2024-04-16 DIAGNOSIS — N81.9 VAGINAL VAULT PROLAPSE: ICD-10-CM

## 2024-04-16 LAB
ALBUMIN/CREAT UR: 17.6 UG/MG (ref 0–30)
BILIRUB UR QL STRIP: NEGATIVE
CLARITY UR: CLEAR
COLOR UR: YELLOW
CREAT UR-MCNC: 68 MG/DL (ref 15–325)
CREAT UR-MCNC: 68 MG/DL (ref 15–325)
GLUCOSE UR QL STRIP: NEGATIVE
HGB UR QL STRIP: NEGATIVE
KETONES UR QL STRIP: NEGATIVE
LEUKOCYTE ESTERASE UR QL STRIP: NEGATIVE
MICROALBUMIN UR DL<=1MG/L-MCNC: 12 UG/ML
NITRITE UR QL STRIP: NEGATIVE
PH UR STRIP: 6 [PH] (ref 5–8)
PROT UR QL STRIP: NEGATIVE
PROT UR-MCNC: <7 MG/DL (ref 0–15)
PROT/CREAT UR: NORMAL MG/G{CREAT} (ref 0–0.2)
SP GR UR STRIP: 1.01 (ref 1–1.03)
URN SPEC COLLECT METH UR: NORMAL

## 2024-04-16 PROCEDURE — 82043 UR ALBUMIN QUANTITATIVE: CPT | Performed by: STUDENT IN AN ORGANIZED HEALTH CARE EDUCATION/TRAINING PROGRAM

## 2024-04-16 PROCEDURE — 3074F SYST BP LT 130 MM HG: CPT | Mod: CPTII,S$GLB,, | Performed by: OBSTETRICS & GYNECOLOGY

## 2024-04-16 PROCEDURE — 99999 PR PBB SHADOW E&M-EST. PATIENT-LVL III: CPT | Mod: PBBFAC,,, | Performed by: OBSTETRICS & GYNECOLOGY

## 2024-04-16 PROCEDURE — 1101F PT FALLS ASSESS-DOCD LE1/YR: CPT | Mod: CPTII,S$GLB,, | Performed by: OBSTETRICS & GYNECOLOGY

## 2024-04-16 PROCEDURE — 81003 URINALYSIS AUTO W/O SCOPE: CPT | Mod: PO | Performed by: STUDENT IN AN ORGANIZED HEALTH CARE EDUCATION/TRAINING PROGRAM

## 2024-04-16 PROCEDURE — 84156 ASSAY OF PROTEIN URINE: CPT | Performed by: STUDENT IN AN ORGANIZED HEALTH CARE EDUCATION/TRAINING PROGRAM

## 2024-04-16 PROCEDURE — 3078F DIAST BP <80 MM HG: CPT | Mod: CPTII,S$GLB,, | Performed by: OBSTETRICS & GYNECOLOGY

## 2024-04-16 PROCEDURE — 3288F FALL RISK ASSESSMENT DOCD: CPT | Mod: CPTII,S$GLB,, | Performed by: OBSTETRICS & GYNECOLOGY

## 2024-04-16 PROCEDURE — 1126F AMNT PAIN NOTED NONE PRSNT: CPT | Mod: CPTII,S$GLB,, | Performed by: OBSTETRICS & GYNECOLOGY

## 2024-04-16 PROCEDURE — 99213 OFFICE O/P EST LOW 20 MIN: CPT | Mod: 57,S$GLB,, | Performed by: OBSTETRICS & GYNECOLOGY

## 2024-04-16 PROCEDURE — 1159F MED LIST DOCD IN RCRD: CPT | Mod: CPTII,S$GLB,, | Performed by: OBSTETRICS & GYNECOLOGY

## 2024-04-16 RX ORDER — FAMOTIDINE 20 MG/1
20 TABLET, FILM COATED ORAL
Status: CANCELLED | OUTPATIENT
Start: 2024-04-16

## 2024-04-16 RX ORDER — CIPROFLOXACIN 2 MG/ML
400 INJECTION, SOLUTION INTRAVENOUS
Status: CANCELLED | OUTPATIENT
Start: 2024-04-16

## 2024-04-16 RX ORDER — MUPIROCIN 20 MG/G
OINTMENT TOPICAL
Status: CANCELLED | OUTPATIENT
Start: 2024-04-16

## 2024-04-16 RX ORDER — CLINDAMYCIN PHOSPHATE 900 MG/50ML
900 INJECTION, SOLUTION INTRAVENOUS
Status: CANCELLED | OUTPATIENT
Start: 2024-04-16

## 2024-04-16 RX ORDER — SODIUM CHLORIDE 9 MG/ML
INJECTION, SOLUTION INTRAVENOUS CONTINUOUS
Status: CANCELLED | OUTPATIENT
Start: 2024-04-16
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
## 2024-04-27 DIAGNOSIS — F41.9 ANXIETY: ICD-10-CM

## 2024-04-27 NOTE — TELEPHONE ENCOUNTER
Care Due:                  Date            Visit Type   Department     Provider  --------------------------------------------------------------------------------                                             [facility]  Last Visit: 12-      [facility]          JOVANNY Phillip                               -                              [facility]  Next Visit: 06-      CARE ([facility])   MEDICINE       Terence Phillip                                                            Last  Test          Frequency    Reason                     Performed    Due Date  --------------------------------------------------------------------------------    Lipid Panel.  12 months..  pravastatin..............  02- 02-    [facility] Embedded Care Due Messages. Reference number: [id number].   4/27/2024 12:18:13 PM CDT

## 2024-04-29 RX ORDER — ALPRAZOLAM 0.25 MG/1
0.25 TABLET ORAL 2 TIMES DAILY PRN
Qty: 60 TABLET | Refills: 2 | Status: SHIPPED | OUTPATIENT
Start: 2024-04-29 | End: 2024-06-04

## 2024-05-06 ENCOUNTER — OFFICE VISIT (OUTPATIENT)
Dept: OBSTETRICS AND GYNECOLOGY | Facility: CLINIC | Age: 79
End: 2024-05-06
Payer: MEDICARE

## 2024-05-06 VITALS
DIASTOLIC BLOOD PRESSURE: 58 MMHG | BODY MASS INDEX: 20.47 KG/M2 | SYSTOLIC BLOOD PRESSURE: 106 MMHG | WEIGHT: 108.44 LBS | HEIGHT: 61 IN

## 2024-05-06 DIAGNOSIS — Z09 POSTOP CHECK: ICD-10-CM

## 2024-05-06 DIAGNOSIS — N81.9 VAGINAL VAULT PROLAPSE: Primary | ICD-10-CM

## 2024-05-06 PROCEDURE — 1159F MED LIST DOCD IN RCRD: CPT | Mod: CPTII,S$GLB,, | Performed by: OBSTETRICS & GYNECOLOGY

## 2024-05-06 PROCEDURE — 1126F AMNT PAIN NOTED NONE PRSNT: CPT | Mod: CPTII,S$GLB,, | Performed by: OBSTETRICS & GYNECOLOGY

## 2024-05-06 PROCEDURE — 3074F SYST BP LT 130 MM HG: CPT | Mod: CPTII,S$GLB,, | Performed by: OBSTETRICS & GYNECOLOGY

## 2024-05-06 PROCEDURE — 3078F DIAST BP <80 MM HG: CPT | Mod: CPTII,S$GLB,, | Performed by: OBSTETRICS & GYNECOLOGY

## 2024-05-06 PROCEDURE — 1101F PT FALLS ASSESS-DOCD LE1/YR: CPT | Mod: CPTII,S$GLB,, | Performed by: OBSTETRICS & GYNECOLOGY

## 2024-05-06 PROCEDURE — 99024 POSTOP FOLLOW-UP VISIT: CPT | Mod: S$GLB,,, | Performed by: OBSTETRICS & GYNECOLOGY

## 2024-05-06 PROCEDURE — 3288F FALL RISK ASSESSMENT DOCD: CPT | Mod: CPTII,S$GLB,, | Performed by: OBSTETRICS & GYNECOLOGY

## 2024-05-06 PROCEDURE — 99999 PR PBB SHADOW E&M-EST. PATIENT-LVL IV: CPT | Mod: PBBFAC,,, | Performed by: OBSTETRICS & GYNECOLOGY

## 2024-05-06 NOTE — PROGRESS NOTES
History of Present Illness:   Pateint presents today  2 weeks postop, status post colpectomy, with complaint of none.    Pathology: none    Past medical and surgical history reviewed.   I have reviewed the patient's medical history in detail and updated the computerized patient record.    Physical exam:  Vital Signs: as above, reviewed.  Constitutional: She is oriented to person, place, and time. She appears well-developed and well-nourished. No distress.   HENT:   Head: Normocephalic and atraumatic.   Eyes: Conjunctivae and EOM are normal. No scleral icterus.   Neck: Normal range of motion. Neck supple. No tracheal deviation present.   Cardiovascular: Normal rate.    Pulmonary/Chest: Effort normal. No respiratory distress. She exhibits no tenderness.  Breasts: deferred  Abdominal: Soft. She exhibits no distension and no mass. There is no rebound and no guarding.   Genitourinary: Deferred  Musculoskeletal: Normal range of motion.   Lymphadenopathy: No inguinal adenopathy present.   Neurological: She is alert and oriented to person, place, and time. Coordination normal.   Skin: Skin is warm and dry. She is not diaphoretic.   Psychiatric: She has a normal mood and affect.    Assessment:  Normal  week postop sx    Plan:  Follow up  4 weeks  Increase activity slowly

## 2024-05-15 ENCOUNTER — TELEPHONE (OUTPATIENT)
Dept: OBSTETRICS AND GYNECOLOGY | Facility: CLINIC | Age: 79
End: 2024-05-15
Payer: MEDICARE

## 2024-05-15 RX ORDER — ONDANSETRON 4 MG/1
4 TABLET, ORALLY DISINTEGRATING ORAL EVERY 8 HOURS PRN
Qty: 30 TABLET | Refills: 0 | Status: SHIPPED | OUTPATIENT
Start: 2024-05-15

## 2024-05-15 NOTE — TELEPHONE ENCOUNTER
Patient called and stated that she stopped taking that ibuprofen and now she is extremely nauseated and would like something sent into the pharmacy to help with that.    Please advise.

## 2024-05-15 NOTE — TELEPHONE ENCOUNTER
----- Message from Louisa Graham sent at 5/15/2024 10:52 AM CDT -----  Contact: patient  Type:  Needs Medical Advice    Who Called: patient     Would the patient rather a call back or a response via [facility]? Call     Best Call Back Number: [phone] (home)      Additional Information: patient would like to speak with the nurse in regards to getting a prescription sent to the pharmacy for Ibuprofen. She stated that she stopped medication but it has caused her to be nausea.     [id number] - [place] [address] & [address] [place] [id number]  Phone: [phone] Fax: [phone]    Please call to advise

## 2024-05-21 RX ORDER — GABAPENTIN 600 MG/1
TABLET ORAL
Qty: 30 TABLET | Refills: 2 | Status: SHIPPED | OUTPATIENT
Start: 2024-05-21

## 2024-05-21 NOTE — TELEPHONE ENCOUNTER
No care due was identified.  [facility] Embedded Care Due Messages. Reference number: [id number].   5/21/2024 1:43:48 PM CDT

## 2024-05-27 DIAGNOSIS — F19.982 DRUG-INDUCED INSOMNIA: ICD-10-CM

## 2024-05-27 RX ORDER — ZOLPIDEM TARTRATE 10 MG/1
10 TABLET ORAL NIGHTLY
Qty: 30 TABLET | Refills: 0 | Status: SHIPPED | OUTPATIENT
Start: 2024-05-27

## 2024-06-03 ENCOUNTER — OFFICE VISIT (OUTPATIENT)
Dept: URGENT CARE | Facility: CLINIC | Age: 79
End: 2024-06-03
Payer: MEDICARE

## 2024-06-03 VITALS
RESPIRATION RATE: 15 BRPM | HEART RATE: 65 BPM | TEMPERATURE: 98 F | BODY MASS INDEX: 20.39 KG/M2 | WEIGHT: 108 LBS | OXYGEN SATURATION: 98 % | SYSTOLIC BLOOD PRESSURE: 110 MMHG | DIASTOLIC BLOOD PRESSURE: 68 MMHG | HEIGHT: 61 IN

## 2024-06-03 DIAGNOSIS — J40 BRONCHITIS: Primary | ICD-10-CM

## 2024-06-03 DIAGNOSIS — R09.89 CHEST CONGESTION: ICD-10-CM

## 2024-06-03 PROCEDURE — 71046 X-RAY EXAM CHEST 2 VIEWS: CPT | Mod: S$GLB,,, | Performed by: RADIOLOGY

## 2024-06-03 PROCEDURE — 99213 OFFICE O/P EST LOW 20 MIN: CPT | Mod: S$GLB,,, | Performed by: PHYSICIAN ASSISTANT

## 2024-06-03 RX ORDER — AZITHROMYCIN 250 MG/1
TABLET, FILM COATED ORAL
Qty: 6 TABLET | Refills: 0 | Status: SHIPPED | OUTPATIENT
Start: 2024-06-03 | End: 2024-06-08

## 2024-06-03 RX ORDER — PREDNISONE 20 MG/1
20 TABLET ORAL DAILY
Qty: 5 TABLET | Refills: 0 | Status: SHIPPED | OUTPATIENT
Start: 2024-06-03 | End: 2024-06-04 | Stop reason: SDUPTHER

## 2024-06-03 RX ORDER — PROMETHAZINE HYDROCHLORIDE 6.25 MG/5ML
SYRUP ORAL
Qty: 120 ML | Refills: 1 | Status: SHIPPED | OUTPATIENT
Start: 2024-06-03

## 2024-06-03 NOTE — PATIENT INSTRUCTIONS

## 2024-06-03 NOTE — PROGRESS NOTES
"Subjective:      Patient ID: Luis Miguel Murcia is a 79 y.o. female.    Vitals:  height is 5' 1" (1.549 m) and weight is 49 kg (108 lb). Her oral temperature is 98.2 °F (36.8 °C). Her blood pressure is 81/45 (abnormal) and her pulse is 65. Her respiration is 15 and oxygen saturation is 98%.     Chief Complaint: Sinus Problem    Patient presents to clinic with complaint of sinus congestion and chest heaviness. Symptoms started 4 days ago. She states that about 2 months ago her sinuses started to act up and now it has worked its way down to her chest. Patient has hx of COPD and uses her rescue inhaler; inhaler has not helped.     Sinus Problem  This is a new problem. The current episode started in the past 7 days. The problem is unchanged. There has been no fever. Her pain is at a severity of 2/10. Associated symptoms include congestion, coughing and sinus pressure. Pertinent negatives include no chills, diaphoresis, ear pain, headaches, hoarse voice, neck pain, shortness of breath, sneezing, sore throat or swollen glands. Treatments tried: NettiPot, Rescue Inhaler.       Constitution: Negative for chills, sweating, fatigue and fever.   HENT:  Positive for congestion, postnasal drip, sinus pain and sinus pressure. Negative for ear pain, drooling, sore throat, trouble swallowing and voice change.    Neck: Negative for neck pain, neck stiffness, painful lymph nodes and neck swelling.   Cardiovascular:  Negative for chest pain, leg swelling, palpitations, sob on exertion and passing out.   Eyes:  Negative for eye pain, eye redness, photophobia, double vision, blurred vision and eyelid swelling.   Respiratory:  Positive for chest tightness and cough. Negative for sputum production, bloody sputum, shortness of breath, stridor and wheezing.    Gastrointestinal:  Negative for abdominal pain, abdominal bloating, nausea, vomiting, constipation, diarrhea and heartburn.   Musculoskeletal:  Negative for joint pain, joint " swelling, abnormal ROM of joint, back pain, muscle cramps and muscle ache.   Skin:  Negative for rash and hives.   Allergic/Immunologic: Negative for seasonal allergies, food allergies, hives, itching and sneezing.   Neurological:  Negative for dizziness, light-headedness, passing out, loss of balance, headaches, altered mental status, loss of consciousness and seizures.   Hematologic/Lymphatic: Negative for swollen lymph nodes.   Psychiatric/Behavioral:  Negative for altered mental status and nervous/anxious. The patient is not nervous/anxious.       Objective:     Physical Exam   Constitutional: She is oriented to person, place, and time. She appears well-developed. She is cooperative.  Non-toxic appearance. She does not appear ill. No distress.   HENT:   Head: Normocephalic and atraumatic.   Ears:   Right Ear: Hearing, tympanic membrane, external ear and ear canal normal.   Left Ear: Hearing, tympanic membrane, external ear and ear canal normal.   Nose: Mucosal edema and rhinorrhea present. No nasal deformity. No epistaxis. Right sinus exhibits no maxillary sinus tenderness and no frontal sinus tenderness. Left sinus exhibits no maxillary sinus tenderness and no frontal sinus tenderness.   Mouth/Throat: Uvula is midline and mucous membranes are normal. No trismus in the jaw. Normal dentition. No uvula swelling. Posterior oropharyngeal erythema and cobblestoning present. No oropharyngeal exudate, posterior oropharyngeal edema or tonsillar abscesses. No tonsillar exudate.   Eyes: Conjunctivae and lids are normal. No scleral icterus.   Neck: Trachea normal and phonation normal. Neck supple. No edema present. No erythema present. No neck rigidity present.   Cardiovascular: Normal rate, regular rhythm, normal heart sounds and normal pulses.   Pulmonary/Chest: Effort normal. No accessory muscle usage or stridor. No respiratory distress. She has no decreased breath sounds. She has no wheezes. She has rhonchi in the  right lower field and the left lower field. She has no rales.   Abdominal: Normal appearance.   Musculoskeletal: Normal range of motion.         General: No deformity or edema. Normal range of motion.   Lymphadenopathy:     She has no cervical adenopathy.   Neurological: She is alert and oriented to person, place, and time. She exhibits normal muscle tone. Coordination normal.   Skin: Skin is warm, dry, intact, not diaphoretic, not pale and no rash. Capillary refill takes less than 2 seconds.   Psychiatric: Her speech is normal and behavior is normal. Judgment and thought content normal.   Nursing note and vitals reviewed.      Assessment:     1. Bronchitis    2. Chest congestion        Plan:       Bronchitis    Chest congestion  -     XR CHEST PA AND LATERAL; Future; Expected date: 06/03/2024    Other orders  -     azithromycin (Z-JOSEPHINE) 250 MG tablet; Take 2 tablets by mouth on day 1; Take 1 tablet by mouth on days 2-5  Dispense: 6 tablet; Refill: 0  -     promethazine (PHENERGAN) 6.25 mg/5 mL syrup; Take 10 mLs at night as needed.  Dispense: 120 mL; Refill: 1  -     predniSONE (DELTASONE) 20 MG tablet; Take 1 tablet (20 mg total) by mouth once daily. for 5 days  Dispense: 5 tablet; Refill: 0      Patient Instructions   INSTRUCTIONS:  - Rest.  - Drink plenty of fluids.  - Take Tylenol and/or Ibuprofen as directed as needed for fever/pain.  Do not take more than the recommended dose.  - follow up with your PCP within the next 1-2 weeks as needed.  - You must understand that you have received an Urgent Care treatment only and that you may be released before all of your medical problems are known or treated.   - You, the patient, will arrange for follow up care as instructed.   - If your condition worsens or fails to improve we recommend that you receive another evaluation at the ER immediately or contact your PCP to discuss your concerns.   - You can call [phone] or [phone] to help schedule an  appointment with the appropriate provider.     -If you smoke cigarettes, it would be beneficial for you to stop.

## 2024-06-04 ENCOUNTER — TELEPHONE (OUTPATIENT)
Dept: FAMILY MEDICINE | Facility: CLINIC | Age: 79
End: 2024-06-04

## 2024-06-04 ENCOUNTER — OFFICE VISIT (OUTPATIENT)
Dept: FAMILY MEDICINE | Facility: CLINIC | Age: 79
End: 2024-06-04
Payer: MEDICARE

## 2024-06-04 VITALS
HEIGHT: 61 IN | DIASTOLIC BLOOD PRESSURE: 50 MMHG | WEIGHT: 108.44 LBS | TEMPERATURE: 98 F | HEART RATE: 58 BPM | SYSTOLIC BLOOD PRESSURE: 100 MMHG | BODY MASS INDEX: 20.47 KG/M2

## 2024-06-04 DIAGNOSIS — F19.982 DRUG-INDUCED INSOMNIA: ICD-10-CM

## 2024-06-04 DIAGNOSIS — F41.9 ANXIETY: Primary | ICD-10-CM

## 2024-06-04 DIAGNOSIS — I10 ESSENTIAL HYPERTENSION: ICD-10-CM

## 2024-06-04 DIAGNOSIS — I70.0 ATHEROSCLEROSIS OF AORTA: ICD-10-CM

## 2024-06-04 DIAGNOSIS — J43.2 CENTRILOBULAR EMPHYSEMA: ICD-10-CM

## 2024-06-04 DIAGNOSIS — Z79.891 CHRONIC USE OF OPIATE DRUG FOR THERAPEUTIC PURPOSE: ICD-10-CM

## 2024-06-04 DIAGNOSIS — Z79.899 CHRONIC PRESCRIPTION BENZODIAZEPINE USE: ICD-10-CM

## 2024-06-04 DIAGNOSIS — D47.3 ESSENTIAL (HEMORRHAGIC) THROMBOCYTHEMIA: ICD-10-CM

## 2024-06-04 PROBLEM — B02.9 SHINGLES: Status: RESOLVED | Noted: 2023-11-09 | Resolved: 2024-06-04

## 2024-06-04 PROBLEM — G92.8 TOXIC METABOLIC ENCEPHALOPATHY: Status: RESOLVED | Noted: 2023-11-09 | Resolved: 2024-06-04

## 2024-06-04 PROCEDURE — 3074F SYST BP LT 130 MM HG: CPT | Mod: CPTII,S$GLB,, | Performed by: STUDENT IN AN ORGANIZED HEALTH CARE EDUCATION/TRAINING PROGRAM

## 2024-06-04 PROCEDURE — 3288F FALL RISK ASSESSMENT DOCD: CPT | Mod: CPTII,S$GLB,, | Performed by: STUDENT IN AN ORGANIZED HEALTH CARE EDUCATION/TRAINING PROGRAM

## 2024-06-04 PROCEDURE — 99214 OFFICE O/P EST MOD 30 MIN: CPT | Mod: S$GLB,,, | Performed by: STUDENT IN AN ORGANIZED HEALTH CARE EDUCATION/TRAINING PROGRAM

## 2024-06-04 PROCEDURE — 1159F MED LIST DOCD IN RCRD: CPT | Mod: CPTII,S$GLB,, | Performed by: STUDENT IN AN ORGANIZED HEALTH CARE EDUCATION/TRAINING PROGRAM

## 2024-06-04 PROCEDURE — 3078F DIAST BP <80 MM HG: CPT | Mod: CPTII,S$GLB,, | Performed by: STUDENT IN AN ORGANIZED HEALTH CARE EDUCATION/TRAINING PROGRAM

## 2024-06-04 PROCEDURE — 1101F PT FALLS ASSESS-DOCD LE1/YR: CPT | Mod: CPTII,S$GLB,, | Performed by: STUDENT IN AN ORGANIZED HEALTH CARE EDUCATION/TRAINING PROGRAM

## 2024-06-04 PROCEDURE — 99999 PR PBB SHADOW E&M-EST. PATIENT-LVL V: CPT | Mod: PBBFAC,,, | Performed by: STUDENT IN AN ORGANIZED HEALTH CARE EDUCATION/TRAINING PROGRAM

## 2024-06-04 PROCEDURE — 1126F AMNT PAIN NOTED NONE PRSNT: CPT | Mod: CPTII,S$GLB,, | Performed by: STUDENT IN AN ORGANIZED HEALTH CARE EDUCATION/TRAINING PROGRAM

## 2024-06-04 RX ORDER — ALPRAZOLAM 0.25 MG/1
0.25 TABLET ORAL 2 TIMES DAILY PRN
Qty: 60 TABLET | Refills: 4 | Status: SHIPPED | OUTPATIENT
Start: 2024-06-04

## 2024-06-04 RX ORDER — PREDNISONE 20 MG/1
20 TABLET ORAL DAILY
Qty: 5 TABLET | Refills: 0 | Status: SHIPPED | OUTPATIENT
Start: 2024-06-04 | End: 2024-06-09

## 2024-06-04 NOTE — ASSESSMENT & PLAN NOTE
Patient is currently taking alprazolam usually once, sometimes twice a day. She does have periods when she can go several days without it. I had sent a prescription for duloxetine which she did not tolerate (somnolence). I discussed the long term risks of chronic benzodiazepine use, especially in concurrence with chronic hydrocodone use and chronic Ambien use. I offered to prescribe Wellbutrin to help cut down alprazolam use - patient declined.

## 2024-06-04 NOTE — PATIENT INSTRUCTIONS
YOU ARE SEEING THIS BECAUSE YOU ARE ON CHRONIC BENZODIAZEPINE THERAPY.  Benzodiazepine medications are high-risk and do have abuse potential, so there are certain precautions and regulations in place:  Chronic benzodiazepine use is safest if I know exactly how much you are taking, so I should be the only physician providing your prescription. The only exception I allow is my primary care colleagues filling a prescription in my absence.  You are to take your medication as prescribed or less frequently than prescribed. If you take your medication more frequently than prescribed, this can put you at risk for various hazardous outcomes. Additionally, this will be grounds for me terminating your prescription.  I will not provide early refills for lost or stolen medications.  The state requires that I see you every 4-6 months to assess efficacy and tolerance of the medication. If I have not seen you in that time frame, I cannot fill the medication for you. Please assure after every visit that you have a follow up scheduled.  We may need to randomly test your urine to assure compliance with the medication.   Your body can develop tolerance to benzodiazepines. This means that you will eventually need more and more to get the same effect. Needing more and more will put you at greater risk of side effects, especially if you are also taking other controlled substances such as narcotics or sleep aids.  Your body can also become dependent on benzodiazepines and can go into withdrawal. Benzodiazepines can be excellent medications for short term anxiety and insomnia, but long-term benzodiazepine use can make your chronic anxiety and your chronic insomnia worse. If we need to taper the medication, discussing a plan with me is important as abrupt benzodiazepine withdrawal can induce seizures.

## 2024-06-04 NOTE — ASSESSMENT & PLAN NOTE
Blood pressure readings for the past month have been on the low end of normal or in hypotensive range. Stop carvedilol today. I will contact her in a few weeks to check on home blood pressure readings.

## 2024-06-04 NOTE — PROGRESS NOTES
Name: Luis Miguel Murcia  MRN: [id number]  : 1945  PCP: Terence Phillip MD    Subjective   Patient presents for follow up.     Review of Systems    Patient Active Problem List   Diagnosis    Lumbar spondylosis    DDD (degenerative disc disease), cervical    DDD (degenerative disc disease), lumbar    Hyperlipidemia    Senile nuclear sclerosis    History of vitrectomy - Right Eye    Blepharoconjunctivitis of both eyes    Astigmatism with presbyopia    Drug-induced insomnia    GERD (gastroesophageal reflux disease)    Pseudophakia - Right Eye    Anxiety    Chronic use of opiate drugs therapeutic purposes    Degeneration of cervical intervertebral disc    Cervical spondylosis without myelopathy    Degeneration of lumbar or lumbosacral intervertebral disc    Lumbosacral spondylosis without myelopathy    Lumbar radiculopathy    Atherosclerosis of native arteries of the extremities with intermittent claudication    Symptoms involving cardiovascular system    Essential hypertension    ACP (advance care planning)    Retinal macular atrophy    NS (nuclear sclerosis), left    Female pelvic pain    Senile purpura    COPD (chronic obstructive pulmonary disease)    Chronic kidney disease, stage 3a    Lung nodule seen on imaging study    Chronic anemia    Essential (hemorrhagic) thrombocythemia    Osteopenia of multiple sites    Iron deficiency anemia    Hyponatremia    Urinary retention    Metabolic acidosis    Dehydration    Vaginal vault prolapse    Cystocele, midline    Chronic prescription benzodiazepine use    Atherosclerosis of aorta       Health Maintenance Due   Topic Date Due    COVID-19 Vaccine ( season) 2023    Lipid Panel  2024       Objective   Vitals:    24 1420   BP: (!) 100/50   Pulse:    Temp:        Physical Exam  Constitutional:       General: She is not in acute distress.     Appearance: Normal appearance. She is well-developed.   HENT:      Head: Normocephalic and  atraumatic.      Right Ear: External ear normal.      Left Ear: External ear normal.   Eyes:      Conjunctiva/sclera: Conjunctivae normal.   Pulmonary:      Effort: Pulmonary effort is normal. No respiratory distress.   Abdominal:      General: Abdomen is flat. There is no distension.   Musculoskeletal:         General: No swelling or deformity.      Right lower leg: No edema.      Left lower leg: No edema.   Skin:     General: Skin is warm and dry.      Coloration: Skin is not jaundiced.   Neurological:      Mental Status: She is alert and oriented to person, place, and time. Mental status is at baseline.   Psychiatric:         Attention and Perception: Attention and perception normal.         Mood and Affect: Mood normal.         Speech: Speech normal.         Behavior: Behavior normal. Behavior is cooperative.         Thought Content: Thought content normal.         Cognition and Memory: Cognition normal.         Judgment: Judgment normal.         Assessment & Plan   1. Anxiety  Assessment & Plan:  Patient is currently taking alprazolam usually once, sometimes twice a day. She does have periods when she can go several days without it. I had sent a prescription for duloxetine which she did not tolerate (somnolence). I discussed the long term risks of chronic benzodiazepine use, especially in concurrence with chronic hydrocodone use and chronic Ambien use. I offered to prescribe Wellbutrin to help cut down alprazolam use - patient declined.    Orders:  -     ALPRAZolam (XANAX) 0.25 MG tablet; Take 1 tablet (0.25 mg total) by mouth 2 (two) times daily as needed for Anxiety.  Dispense: 60 tablet; Refill: 4    2. Chronic prescription benzodiazepine use    3. Chronic use of opiate drugs therapeutic purposes  Overview:  On contract  [employer] Regulatory Load October 2019      4. Drug-induced insomnia    5. Essential hypertension  Assessment & Plan:  Blood pressure readings for the past month have been on the low end of normal  or in hypotensive range. Stop carvedilol today. I will contact her in a few weeks to check on home blood pressure readings.      6. Centrilobular emphysema  Assessment & Plan:  Patient's breathing has been well controlled without inhaler until a recent bout of bronchitis for which she saw urgent care yesterday. Urgent care prescribed her prednisone as part of her treatment but pharmacy didn't fill it as triamcinolone was listed as an allergy. This was a skin reaction to a cream. Patient states she has taken oral steroids before with no issue. I have sent a script stating that it is safe for her to take.      7. Atherosclerosis of aorta  Assessment & Plan:  Taking aspirin daily. Continue such.      8. Essential (hemorrhagic) thrombocythemia  Assessment & Plan:  Labs from 4/2024 show stable platelet counts. Continue to monitor.      Other orders  -     predniSONE (DELTASONE) 20 MG tablet; Take 1 tablet (20 mg total) by mouth once daily. for 5 days  Dispense: 5 tablet; Refill: 0        Follow up in 4 months. I spent 33 minutes pre-charting, interviewing patient, performing exam, formulating and discussing plan, placing orders, and documenting.     Terence Phillip MD  06/04/2024

## 2024-06-04 NOTE — ASSESSMENT & PLAN NOTE
Patient's breathing has been well controlled without inhaler until a recent bout of bronchitis for which she saw urgent care yesterday. Urgent care prescribed her prednisone as part of her treatment but pharmacy didn't fill it as triamcinolone was listed as an allergy. This was a skin reaction to a cream. Patient states she has taken oral steroids before with no issue. I have sent a script stating that it is safe for her to take.

## 2024-06-04 NOTE — TELEPHONE ENCOUNTER
----- Message from Pam Hansen sent at 6/4/2024 12:18 PM CDT -----  Regarding: Miss appt  Type:  Needs Medical Advice    Who Called: Pt    Would the patient rather a call back or a response via [facility]? Call back    Best Call Back Number: [phone]    Additional Information: Pt will missed appt today, pt not fel well. Pt will like reschedule appt but 1st availability is 10/2024. Pt would like a call back for appt. Thank you

## 2024-06-10 ENCOUNTER — TELEPHONE (OUTPATIENT)
Dept: FAMILY MEDICINE | Facility: CLINIC | Age: 79
End: 2024-06-10

## 2024-06-10 NOTE — TELEPHONE ENCOUNTER
----- Message from Aakash Sam sent at 6/10/2024 10:54 AM CDT -----  Regarding: blood pressure issues  Type:  Needs Medical Advice    Who Called: Pt    Symptoms (please be specific): blood pressure     How long has patient had these symptoms:      Pharmacy name and phone #:    [id number] - [place] [id number] [facility] [address]  [address]  [facility] [address]  Phone: [phone] Fax: [phone]      Would the patient rather a call back or a response via [facility]? Call back    Best Call Back Number: [phone]      Additional Information: sts that her BP is not stable it goes extremely high and then goes very low and is constantly changing and she wants to figure out something she doesn't want to keep taking the new meds and they are not working right.  Sts she really needs a call back to talk to someone.   Please advise -- Thank you

## 2024-06-12 ENCOUNTER — OFFICE VISIT (OUTPATIENT)
Dept: FAMILY MEDICINE | Facility: CLINIC | Age: 79
End: 2024-06-12
Payer: MEDICARE

## 2024-06-12 VITALS — OXYGEN SATURATION: 98 % | SYSTOLIC BLOOD PRESSURE: 138 MMHG | DIASTOLIC BLOOD PRESSURE: 82 MMHG | HEART RATE: 72 BPM

## 2024-06-12 DIAGNOSIS — E78.5 HYPERLIPIDEMIA, UNSPECIFIED HYPERLIPIDEMIA TYPE: Primary | ICD-10-CM

## 2024-06-12 DIAGNOSIS — I10 ESSENTIAL HYPERTENSION: ICD-10-CM

## 2024-06-12 DIAGNOSIS — F41.9 ANXIETY: ICD-10-CM

## 2024-06-12 DIAGNOSIS — Z79.899 CHRONIC PRESCRIPTION BENZODIAZEPINE USE: ICD-10-CM

## 2024-06-12 DIAGNOSIS — Z79.891 CHRONIC USE OF OPIATE DRUG FOR THERAPEUTIC PURPOSE: ICD-10-CM

## 2024-06-12 PROCEDURE — 3288F FALL RISK ASSESSMENT DOCD: CPT | Mod: CPTII,S$GLB,,

## 2024-06-12 PROCEDURE — 3075F SYST BP GE 130 - 139MM HG: CPT | Mod: CPTII,S$GLB,,

## 2024-06-12 PROCEDURE — 1101F PT FALLS ASSESS-DOCD LE1/YR: CPT | Mod: CPTII,S$GLB,,

## 2024-06-12 PROCEDURE — 3079F DIAST BP 80-89 MM HG: CPT | Mod: CPTII,S$GLB,,

## 2024-06-12 PROCEDURE — 1159F MED LIST DOCD IN RCRD: CPT | Mod: CPTII,S$GLB,,

## 2024-06-12 PROCEDURE — 99214 OFFICE O/P EST MOD 30 MIN: CPT | Mod: S$GLB,,,

## 2024-06-12 PROCEDURE — 99999 PR PBB SHADOW E&M-EST. PATIENT-LVL IV: CPT | Mod: PBBFAC,,,

## 2024-06-12 RX ORDER — CARVEDILOL 12.5 MG/1
12.5 TABLET ORAL 2 TIMES DAILY WITH MEALS
Qty: 180 TABLET | Refills: 3 | Status: SHIPPED | OUTPATIENT
Start: 2024-06-12 | End: 2025-06-12

## 2024-06-12 RX ORDER — AMLODIPINE BESYLATE 5 MG/1
2.5 TABLET ORAL DAILY
Qty: 90 TABLET | Refills: 3 | Status: SHIPPED | OUTPATIENT
Start: 2024-06-12

## 2024-06-12 NOTE — PROGRESS NOTES
Name: Luis Miguel Murcia  MRN: [id number]  : 1945  PCP: Terence Phillip MD    HPI    Patient follows with Dr. Phillip, new to me. She presents for BP concerns. She was stopped on carvedilol 12.5 BID last week due to hypotension.  She complains of tremors since stopping the carvedilol. She takes Norco 7.5 occasionally which she reports she takes half a pill. She also takes xanax mostly once a day. She brought a log of her BP over the last week. She has a handful of BP readings over 140s systolic. No evidence of hypotension since on BP log.     Review of Systems   Constitutional:  Negative for fatigue and fever.   Respiratory:  Negative for shortness of breath.    Cardiovascular:  Negative for chest pain and palpitations.   Gastrointestinal:  Negative for nausea and vomiting.   Musculoskeletal:  Negative for arthralgias and neck pain.       Patient Active Problem List   Diagnosis    Lumbar spondylosis    DDD (degenerative disc disease), cervical    DDD (degenerative disc disease), lumbar    Hyperlipidemia    Senile nuclear sclerosis    History of vitrectomy - Right Eye    Blepharoconjunctivitis of both eyes    Astigmatism with presbyopia    Drug-induced insomnia    GERD (gastroesophageal reflux disease)    Pseudophakia - Right Eye    Anxiety    Chronic use of opiate drugs therapeutic purposes    Degeneration of cervical intervertebral disc    Cervical spondylosis without myelopathy    Degeneration of lumbar or lumbosacral intervertebral disc    Lumbosacral spondylosis without myelopathy    Lumbar radiculopathy    Atherosclerosis of native arteries of the extremities with intermittent claudication    Symptoms involving cardiovascular system    Essential hypertension    ACP (advance care planning)    Retinal macular atrophy    NS (nuclear sclerosis), left    Female pelvic pain    Senile purpura    COPD (chronic obstructive pulmonary disease)    Chronic kidney disease, stage 3a    Lung nodule seen on  imaging study    Chronic anemia    Essential (hemorrhagic) thrombocythemia    Osteopenia of multiple sites    Iron deficiency anemia    Hyponatremia    Urinary retention    Metabolic acidosis    Dehydration    Vaginal vault prolapse    Cystocele, midline    Chronic prescription benzodiazepine use    Atherosclerosis of aorta       Vitals:    06/12/24 1510   BP: 138/82   Pulse: 72       Physical Exam  Constitutional:       General: She is not in acute distress.     Appearance: She is well-developed.   HENT:      Head: Normocephalic and atraumatic.      Right Ear: External ear normal.      Left Ear: External ear normal.   Eyes:      Conjunctiva/sclera: Conjunctivae normal.      Pupils: Pupils are equal, round, and reactive to light.   Neck:      Thyroid: No thyromegaly.   Cardiovascular:      Rate and Rhythm: Normal rate and regular rhythm.      Pulses: Normal pulses.      Heart sounds: Normal heart sounds, S1 normal and S2 normal.   Pulmonary:      Effort: Pulmonary effort is normal. No respiratory distress.      Breath sounds: Normal breath sounds.   Chest:      Chest wall: No tenderness.   Musculoskeletal:         General: No swelling or tenderness. Normal range of motion.      Cervical back: Normal range of motion and neck supple.   Skin:     General: Skin is warm and dry.      Coloration: Skin is not jaundiced or pale.   Neurological:      General: No focal deficit present.      Mental Status: She is alert and oriented to person, place, and time.      Cranial Nerves: No cranial nerve deficit.   Psychiatric:         Mood and Affect: Mood normal.         Behavior: Behavior normal.         1. Hyperlipidemia, unspecified hyperlipidemia type   Continue with statin therapy    2. Essential hypertension  -     carvediloL (COREG) 12.5 MG tablet; Take 1 tablet (12.5 mg total) by mouth 2 (two) times daily with meals.  Dispense: 180 tablet; Refill: 3  -     amLODIPine (NORVASC) 5 MG tablet; Take 0.5 tablets (2.5 mg total) by  mouth once daily.  Dispense: 90 tablet; Refill: 3  She wishes to start back on carvedilol. Will decrease Norvasc to prevent episodes of hypotension given her chronic benzo and opiate use    3. Chronic prescription benzodiazepine use     4. Chronic use of opiate drugs therapeutic purposes  Overview:  On contract  [employer] Regulatory Load October 2019      5. Anxiety   Uses xanax mostly once a day       Follow up in 3 months       LILLY Walters  06/12/2024

## 2024-06-17 ENCOUNTER — TELEPHONE (OUTPATIENT)
Dept: OBSTETRICS AND GYNECOLOGY | Facility: CLINIC | Age: 79
End: 2024-06-17
Payer: MEDICARE

## 2024-06-17 NOTE — TELEPHONE ENCOUNTER
----- Message from Jairo San sent at 6/17/2024 10:01 AM CDT -----  Contact: Self  Type:  Sooner Appointment Request    Caller is requesting a sooner appointment.  Caller declined first available appointment listed below.  Caller will not accept being placed on the waitlist and is requesting a message be sent to doctor.    Name of Caller:  Patient  When is the first available appointment?  7/17  Symptoms:  post op r/s  Would the patient rather a call back or a response via [facility]? Call  Best Call Back Number:  [phone]   Additional Information:   Called to r/s due to weather

## 2024-06-25 ENCOUNTER — OFFICE VISIT (OUTPATIENT)
Dept: OBSTETRICS AND GYNECOLOGY | Facility: CLINIC | Age: 79
End: 2024-06-25
Payer: MEDICARE

## 2024-06-25 VITALS
DIASTOLIC BLOOD PRESSURE: 70 MMHG | SYSTOLIC BLOOD PRESSURE: 100 MMHG | WEIGHT: 108.25 LBS | HEIGHT: 67 IN | BODY MASS INDEX: 16.99 KG/M2

## 2024-06-25 DIAGNOSIS — F19.982 DRUG-INDUCED INSOMNIA: ICD-10-CM

## 2024-06-25 DIAGNOSIS — N81.9 VAGINAL VAULT PROLAPSE: Primary | ICD-10-CM

## 2024-06-25 PROCEDURE — 1159F MED LIST DOCD IN RCRD: CPT | Mod: CPTII,S$GLB,, | Performed by: OBSTETRICS & GYNECOLOGY

## 2024-06-25 PROCEDURE — 3078F DIAST BP <80 MM HG: CPT | Mod: CPTII,S$GLB,, | Performed by: OBSTETRICS & GYNECOLOGY

## 2024-06-25 PROCEDURE — 99024 POSTOP FOLLOW-UP VISIT: CPT | Mod: S$GLB,,, | Performed by: OBSTETRICS & GYNECOLOGY

## 2024-06-25 PROCEDURE — 1126F AMNT PAIN NOTED NONE PRSNT: CPT | Mod: CPTII,S$GLB,, | Performed by: OBSTETRICS & GYNECOLOGY

## 2024-06-25 PROCEDURE — 1101F PT FALLS ASSESS-DOCD LE1/YR: CPT | Mod: CPTII,S$GLB,, | Performed by: OBSTETRICS & GYNECOLOGY

## 2024-06-25 PROCEDURE — 3074F SYST BP LT 130 MM HG: CPT | Mod: CPTII,S$GLB,, | Performed by: OBSTETRICS & GYNECOLOGY

## 2024-06-25 PROCEDURE — 3288F FALL RISK ASSESSMENT DOCD: CPT | Mod: CPTII,S$GLB,, | Performed by: OBSTETRICS & GYNECOLOGY

## 2024-06-25 PROCEDURE — 99999 PR PBB SHADOW E&M-EST. PATIENT-LVL IV: CPT | Mod: PBBFAC,,, | Performed by: OBSTETRICS & GYNECOLOGY

## 2024-06-25 NOTE — PROGRESS NOTES
History of Present Illness:   Pateint presents today  2 weeks postop, status post vaginectomy, with complaint of none.      Past medical and surgical history reviewed.   I have reviewed the patient's medical history in detail and updated the computerized patient record.    Physical exam:  Vital Signs: as above, reviewed.  Constitutional: She is oriented to person, place, and time. She appears well-developed and well-nourished. No distress.   HENT:   Head: Normocephalic and atraumatic.   Eyes: Conjunctivae and EOM are normal. No scleral icterus.   Neck: Normal range of motion. Neck supple. No tracheal deviation present.   Cardiovascular: Normal rate.    Pulmonary/Chest: Effort normal. No respiratory distress. She exhibits no tenderness.  Breasts: deferred  Abdominal: Soft. She exhibits no distension and no mass.  There is no rebound and no guarding.   Genitourinary: healed well, 3cm vaginal depth  Musculoskeletal: Normal range of motion.   Lymphadenopathy: No inguinal adenopathy present.   Neurological: She is alert and oriented to person, place, and time. Coordination normal.   Skin: Skin is warm and dry. She is not diaphoretic.   Psychiatric: She has a normal mood and affect.    Assessment:  Normal postop exam, vaginectomy    Plan:  Follow up  1 year  Resume normal activity, no intercourse.

## 2024-06-26 RX ORDER — ZOLPIDEM TARTRATE 10 MG/1
10 TABLET ORAL NIGHTLY
Qty: 30 TABLET | Refills: 4 | Status: SHIPPED | OUTPATIENT
Start: 2024-06-26

## 2024-06-26 NOTE — TELEPHONE ENCOUNTER
No care due was identified.  [facility] Embedded Care Due Messages. Reference number: [id number].   6/25/2024 7:10:23 PM CDT

## 2024-07-25 ENCOUNTER — TELEPHONE (OUTPATIENT)
Dept: OPHTHALMOLOGY | Facility: CLINIC | Age: 79
End: 2024-07-25
Payer: MEDICARE

## 2024-07-25 ENCOUNTER — OFFICE VISIT (OUTPATIENT)
Dept: OPTOMETRY | Facility: CLINIC | Age: 79
End: 2024-07-25
Payer: MEDICARE

## 2024-07-25 DIAGNOSIS — H52.13 MYOPIA WITH ASTIGMATISM AND PRESBYOPIA, BILATERAL: ICD-10-CM

## 2024-07-25 DIAGNOSIS — H52.203 MYOPIA WITH ASTIGMATISM AND PRESBYOPIA, BILATERAL: ICD-10-CM

## 2024-07-25 DIAGNOSIS — H52.4 MYOPIA WITH ASTIGMATISM AND PRESBYOPIA, BILATERAL: ICD-10-CM

## 2024-07-25 DIAGNOSIS — H25.812 COMBINED FORMS OF AGE-RELATED CATARACT OF LEFT EYE: Primary | ICD-10-CM

## 2024-07-25 DIAGNOSIS — H35.89 RETINAL MACULAR ATROPHY: ICD-10-CM

## 2024-07-25 DIAGNOSIS — Z96.1 PSEUDOPHAKIA OF RIGHT EYE: ICD-10-CM

## 2024-07-25 PROBLEM — H25.12 NS (NUCLEAR SCLEROSIS), LEFT: Status: RESOLVED | Noted: 2018-03-12 | Resolved: 2024-07-25

## 2024-07-25 PROCEDURE — 99999 PR PBB SHADOW E&M-EST. PATIENT-LVL IV: CPT | Mod: PBBFAC,,,

## 2024-07-25 PROCEDURE — 92134 CPTRZ OPH DX IMG PST SGM RTA: CPT | Mod: S$GLB,,,

## 2024-07-25 PROCEDURE — 1159F MED LIST DOCD IN RCRD: CPT | Mod: CPTII,S$GLB,,

## 2024-07-25 PROCEDURE — 99204 OFFICE O/P NEW MOD 45 MIN: CPT | Mod: S$GLB,,,

## 2024-07-25 PROCEDURE — 1126F AMNT PAIN NOTED NONE PRSNT: CPT | Mod: CPTII,S$GLB,,

## 2024-07-25 NOTE — PROGRESS NOTES
HPI    Ocular health exam w OCT mac ADRIAN 2018    Pt complains of blurred va OS, pt believes it is her cataract and is   interested in getting sx. Pt denied flashes, floaters and gtts.   Last edited by Idalia Patel, OD on 7/25/2024  3:33 PM.            Assessment /Plan     For exam results, see Encounter Report.    Combined forms of age-related cataract of left eye  -     Ambulatory referral/consult to Ophthalmology; Future; Expected date: 08/01/2024    Pseudophakia of right eye    Retinal macular atrophy  -     Posterior Segment OCT Retina-Both eyes    Myopia with astigmatism and presbyopia, bilateral      Visually significant cataract OS. Ed pt on findings, on age-related nature of cataracts, and associated symptoms of reduced BCVA and glare. Pt elects for surgical consult. Referral placed.  PCIOL OD. Stable. Monitor yearly for changes.  Pt had PPV for macular hole OD ~2013. Mac OCT obtained today stable to previous with significant atrophy and distortion temporal to fovea. Pt has corresponding metamorphopsia in vision. Continue to monitor yearly for changes with repeat Mac OCT, sooner prn.  Defer spec rx secondary to cataract eval.     RTC: to ophthalmology for cataract eval OS

## 2024-07-25 NOTE — Clinical Note
Hi! Please call to schedule pt for cataract eval OS. Pt already had OD done. Prefers [facility]. Thanks in advance!

## 2024-07-25 NOTE — TELEPHONE ENCOUNTER
Left message requesting patient call back to schedule cataract eval with Dr. Montemayor. States Dr. Montemayor's next availability in [place] is in December and [facility]

## 2024-07-25 NOTE — TELEPHONE ENCOUNTER
----- Message from Idalia Patel, OD sent at 7/25/2024  3:41 PM CDT -----  Hi! Please call to schedule pt for cataract eval OS. Pt already had OD done. Prefers [facility]. Thanks in advance!

## 2024-07-26 ENCOUNTER — TELEPHONE (OUTPATIENT)
Dept: OPHTHALMOLOGY | Facility: CLINIC | Age: 79
End: 2024-07-26
Payer: MEDICARE

## 2024-08-18 NOTE — TELEPHONE ENCOUNTER
Care Due:                  Date            Visit Type   Department     Provider  --------------------------------------------------------------------------------                                EP -                              [facility] FAMILY  Last Visit: 06-      CARE ([facility])   MEDICINE       Terence Phillip                              EP -                              PRIMARY      [facility] FAM[facility]  Next Visit: 10-      CARE ([facility])   JOVANNY Phillip                                                            Last  Test          Frequency    Reason                     Performed    Due Date  --------------------------------------------------------------------------------    Lipid Panel.  12 months..  pravastatin..............  02- 02-    [facility] Embedded Care Due Messages. Reference number: [id number].   8/18/2024 10:20:19 AM CDT

## 2024-08-19 RX ORDER — GABAPENTIN 600 MG/1
TABLET ORAL
Qty: 30 TABLET | Refills: 5 | Status: SHIPPED | OUTPATIENT
Start: 2024-08-19

## 2024-08-27 ENCOUNTER — PATIENT MESSAGE (OUTPATIENT)
Dept: FAMILY MEDICINE | Facility: CLINIC | Age: 79
End: 2024-08-27
Payer: MEDICARE

## 2024-09-16 ENCOUNTER — OFFICE VISIT (OUTPATIENT)
Dept: CARDIOLOGY | Facility: CLINIC | Age: 79
End: 2024-09-16
Payer: MEDICARE

## 2024-09-16 ENCOUNTER — LAB VISIT (OUTPATIENT)
Dept: LAB | Facility: HOSPITAL | Age: 79
End: 2024-09-16
Payer: MEDICARE

## 2024-09-16 VITALS
HEART RATE: 63 BPM | SYSTOLIC BLOOD PRESSURE: 102 MMHG | DIASTOLIC BLOOD PRESSURE: 53 MMHG | HEIGHT: 61 IN | BODY MASS INDEX: 20.73 KG/M2 | WEIGHT: 109.81 LBS

## 2024-09-16 DIAGNOSIS — I10 ESSENTIAL HYPERTENSION: ICD-10-CM

## 2024-09-16 DIAGNOSIS — E78.5 HYPERLIPIDEMIA, UNSPECIFIED HYPERLIPIDEMIA TYPE: ICD-10-CM

## 2024-09-16 DIAGNOSIS — R09.89 SYMPTOMS INVOLVING CARDIOVASCULAR SYSTEM: ICD-10-CM

## 2024-09-16 DIAGNOSIS — I70.219 ATHEROSCLEROSIS OF NATIVE ARTERY OF EXTREMITY WITH INTERMITTENT CLAUDICATION, UNSPECIFIED EXTREMITY: ICD-10-CM

## 2024-09-16 DIAGNOSIS — N18.31 CHRONIC KIDNEY DISEASE, STAGE 3A: ICD-10-CM

## 2024-09-16 DIAGNOSIS — M50.30 DDD (DEGENERATIVE DISC DISEASE), CERVICAL: Primary | ICD-10-CM

## 2024-09-16 DIAGNOSIS — N18.31 CHRONIC KIDNEY DISEASE, STAGE 3A: Primary | ICD-10-CM

## 2024-09-16 DIAGNOSIS — I70.0 ATHEROSCLEROSIS OF AORTA: ICD-10-CM

## 2024-09-16 DIAGNOSIS — M51.36 DDD (DEGENERATIVE DISC DISEASE), LUMBAR: ICD-10-CM

## 2024-09-16 DIAGNOSIS — Z79.891 CHRONIC USE OF OPIATE DRUG FOR THERAPEUTIC PURPOSE: ICD-10-CM

## 2024-09-16 LAB
ALBUMIN SERPL BCP-MCNC: 3.9 G/DL (ref 3.5–5.2)
ANION GAP SERPL CALC-SCNC: 10 MMOL/L (ref 8–16)
BUN SERPL-MCNC: 16 MG/DL (ref 8–23)
CALCIUM SERPL-MCNC: 9 MG/DL (ref 8.7–10.5)
CHLORIDE SERPL-SCNC: 93 MMOL/L (ref 95–110)
CO2 SERPL-SCNC: 26 MMOL/L (ref 23–29)
CREAT SERPL-MCNC: 0.9 MG/DL (ref 0.5–1.4)
EST. GFR  (NO RACE VARIABLE): >60 ML/MIN/1.73 M^2
GLUCOSE SERPL-MCNC: 76 MG/DL (ref 70–110)
PHOSPHATE SERPL-MCNC: 5.8 MG/DL (ref 2.7–4.5)
POTASSIUM SERPL-SCNC: 4.8 MMOL/L (ref 3.5–5.1)
SODIUM SERPL-SCNC: 129 MMOL/L (ref 136–145)

## 2024-09-16 PROCEDURE — 82570 ASSAY OF URINE CREATININE: CPT | Performed by: STUDENT IN AN ORGANIZED HEALTH CARE EDUCATION/TRAINING PROGRAM

## 2024-09-16 PROCEDURE — 80069 RENAL FUNCTION PANEL: CPT | Performed by: STUDENT IN AN ORGANIZED HEALTH CARE EDUCATION/TRAINING PROGRAM

## 2024-09-16 PROCEDURE — 36415 COLL VENOUS BLD VENIPUNCTURE: CPT | Mod: PO | Performed by: STUDENT IN AN ORGANIZED HEALTH CARE EDUCATION/TRAINING PROGRAM

## 2024-09-16 PROCEDURE — 99204 OFFICE O/P NEW MOD 45 MIN: CPT | Mod: S$GLB,,, | Performed by: INTERNAL MEDICINE

## 2024-09-16 PROCEDURE — 1159F MED LIST DOCD IN RCRD: CPT | Mod: CPTII,S$GLB,, | Performed by: INTERNAL MEDICINE

## 2024-09-16 PROCEDURE — 1160F RVW MEDS BY RX/DR IN RCRD: CPT | Mod: CPTII,S$GLB,, | Performed by: INTERNAL MEDICINE

## 2024-09-16 PROCEDURE — 99999 PR PBB SHADOW E&M-EST. PATIENT-LVL III: CPT | Mod: PBBFAC,,, | Performed by: INTERNAL MEDICINE

## 2024-09-16 PROCEDURE — 1101F PT FALLS ASSESS-DOCD LE1/YR: CPT | Mod: CPTII,S$GLB,, | Performed by: INTERNAL MEDICINE

## 2024-09-16 PROCEDURE — 81003 URINALYSIS AUTO W/O SCOPE: CPT | Mod: PO | Performed by: STUDENT IN AN ORGANIZED HEALTH CARE EDUCATION/TRAINING PROGRAM

## 2024-09-16 PROCEDURE — 3074F SYST BP LT 130 MM HG: CPT | Mod: CPTII,S$GLB,, | Performed by: INTERNAL MEDICINE

## 2024-09-16 PROCEDURE — 1126F AMNT PAIN NOTED NONE PRSNT: CPT | Mod: CPTII,S$GLB,, | Performed by: INTERNAL MEDICINE

## 2024-09-16 PROCEDURE — 3288F FALL RISK ASSESSMENT DOCD: CPT | Mod: CPTII,S$GLB,, | Performed by: INTERNAL MEDICINE

## 2024-09-16 PROCEDURE — 3078F DIAST BP <80 MM HG: CPT | Mod: CPTII,S$GLB,, | Performed by: INTERNAL MEDICINE

## 2024-09-16 RX ORDER — CARVEDILOL 6.25 MG/1
6.25 TABLET ORAL 2 TIMES DAILY WITH MEALS
Qty: 180 TABLET | Refills: 3 | Status: SHIPPED | OUTPATIENT
Start: 2024-09-16 | End: 2025-09-16
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
## 2024-09-17 LAB
ALBUMIN/CREAT UR: 15.4 UG/MG (ref 0–30)
BILIRUB UR QL STRIP: NEGATIVE
CLARITY UR: CLEAR
COLOR UR: YELLOW
CREAT UR-MCNC: 78 MG/DL (ref 15–325)
GLUCOSE UR QL STRIP: NEGATIVE
HGB UR QL STRIP: NEGATIVE
KETONES UR QL STRIP: NEGATIVE
LEUKOCYTE ESTERASE UR QL STRIP: NEGATIVE
MICROALBUMIN UR DL<=1MG/L-MCNC: 12 UG/ML
NITRITE UR QL STRIP: NEGATIVE
PH UR STRIP: 6 [PH] (ref 5–8)
PROT UR QL STRIP: NEGATIVE
SP GR UR STRIP: 1.01 (ref 1–1.03)
URN SPEC COLLECT METH UR: NORMAL

## 2024-10-22 ENCOUNTER — OFFICE VISIT (OUTPATIENT)
Dept: FAMILY MEDICINE | Facility: CLINIC | Age: 79
End: 2024-10-22
Payer: MEDICARE

## 2024-10-22 VITALS
SYSTOLIC BLOOD PRESSURE: 142 MMHG | OXYGEN SATURATION: 99 % | DIASTOLIC BLOOD PRESSURE: 68 MMHG | WEIGHT: 107.25 LBS | BODY MASS INDEX: 20.25 KG/M2 | HEART RATE: 68 BPM | HEIGHT: 61 IN

## 2024-10-22 DIAGNOSIS — M47.817 LUMBOSACRAL SPONDYLOSIS WITHOUT MYELOPATHY: ICD-10-CM

## 2024-10-22 DIAGNOSIS — E78.5 HYPERLIPIDEMIA, UNSPECIFIED HYPERLIPIDEMIA TYPE: ICD-10-CM

## 2024-10-22 DIAGNOSIS — F41.9 ANXIETY: ICD-10-CM

## 2024-10-22 DIAGNOSIS — F19.982 DRUG-INDUCED INSOMNIA: ICD-10-CM

## 2024-10-22 DIAGNOSIS — I10 ESSENTIAL HYPERTENSION: ICD-10-CM

## 2024-10-22 DIAGNOSIS — E87.1 HYPONATREMIA: Primary | ICD-10-CM

## 2024-10-22 PROCEDURE — 99214 OFFICE O/P EST MOD 30 MIN: CPT | Mod: S$GLB,,, | Performed by: STUDENT IN AN ORGANIZED HEALTH CARE EDUCATION/TRAINING PROGRAM

## 2024-10-22 PROCEDURE — 3288F FALL RISK ASSESSMENT DOCD: CPT | Mod: CPTII,S$GLB,, | Performed by: STUDENT IN AN ORGANIZED HEALTH CARE EDUCATION/TRAINING PROGRAM

## 2024-10-22 PROCEDURE — 1101F PT FALLS ASSESS-DOCD LE1/YR: CPT | Mod: CPTII,S$GLB,, | Performed by: STUDENT IN AN ORGANIZED HEALTH CARE EDUCATION/TRAINING PROGRAM

## 2024-10-22 PROCEDURE — 1126F AMNT PAIN NOTED NONE PRSNT: CPT | Mod: CPTII,S$GLB,, | Performed by: STUDENT IN AN ORGANIZED HEALTH CARE EDUCATION/TRAINING PROGRAM

## 2024-10-22 PROCEDURE — 1159F MED LIST DOCD IN RCRD: CPT | Mod: CPTII,S$GLB,, | Performed by: STUDENT IN AN ORGANIZED HEALTH CARE EDUCATION/TRAINING PROGRAM

## 2024-10-22 PROCEDURE — 3077F SYST BP >= 140 MM HG: CPT | Mod: CPTII,S$GLB,, | Performed by: STUDENT IN AN ORGANIZED HEALTH CARE EDUCATION/TRAINING PROGRAM

## 2024-10-22 PROCEDURE — 99999 PR PBB SHADOW E&M-EST. PATIENT-LVL V: CPT | Mod: PBBFAC,,, | Performed by: STUDENT IN AN ORGANIZED HEALTH CARE EDUCATION/TRAINING PROGRAM

## 2024-10-22 PROCEDURE — 3078F DIAST BP <80 MM HG: CPT | Mod: CPTII,S$GLB,, | Performed by: STUDENT IN AN ORGANIZED HEALTH CARE EDUCATION/TRAINING PROGRAM

## 2024-10-22 RX ORDER — OLMESARTAN MEDOXOMIL 20 MG/1
20 TABLET ORAL DAILY
Qty: 30 TABLET | Refills: 11 | Status: SHIPPED | OUTPATIENT
Start: 2024-10-22 | End: 2025-10-22

## 2024-10-22 RX ORDER — PRAVASTATIN SODIUM 20 MG/1
20 TABLET ORAL NIGHTLY
Qty: 90 TABLET | Refills: 3 | Status: SHIPPED | OUTPATIENT
Start: 2024-10-22

## 2024-10-22 RX ORDER — HYDROCODONE BITARTRATE AND ACETAMINOPHEN 10; 325 MG/1; MG/1
1 TABLET ORAL EVERY 8 HOURS PRN
COMMUNITY
Start: 2024-08-26

## 2024-10-22 NOTE — PROGRESS NOTES
Name: Luis Miguel Murcia  MRN: [id number]  : 1945  PCP: Terence Phillip MD    Subjective   Patient presents for regular follow up. Her primary concern is low blood pressure readings on her home cuff. We have also seen low blood pressure readings in clinic in the last few months.     Review of Systems    Patient Active Problem List   Diagnosis    Lumbar spondylosis    DDD (degenerative disc disease), cervical    DDD (degenerative disc disease), lumbar    Hyperlipidemia    History of vitrectomy - Right Eye    Drug-induced insomnia    GERD (gastroesophageal reflux disease)    Anxiety    Chronic use of opiate drugs therapeutic purposes    Degeneration of cervical intervertebral disc    Cervical spondylosis without myelopathy    Degeneration of lumbar or lumbosacral intervertebral disc    Lumbosacral spondylosis without myelopathy    Lumbar radiculopathy    Atherosclerosis of native arteries of the extremities with intermittent claudication    Symptoms involving cardiovascular system    Essential hypertension    ACP (advance care planning)    Retinal macular atrophy    Female pelvic pain    Senile purpura    COPD (chronic obstructive pulmonary disease)    Chronic kidney disease, stage 3a    Lung nodule seen on imaging study    Chronic anemia    Essential (hemorrhagic) thrombocythemia    Osteopenia of multiple sites    Iron deficiency anemia    Hyponatremia    Urinary retention    Metabolic acidosis    Dehydration    Vaginal vault prolapse    Cystocele, midline    Chronic prescription benzodiazepine use    Atherosclerosis of aorta       Health Maintenance Due   Topic Date Due    Lipid Panel  2024    Influenza Vaccine (1) 2024       Objective   Vitals:    10/22/24 1507   BP: (!) 142/68   Pulse: 68       Physical Exam  Constitutional:       General: She is not in acute distress.     Appearance: Normal appearance. She is well-developed.   HENT:      Head: Normocephalic and atraumatic.      Right  Ear: External ear normal.      Left Ear: External ear normal.   Eyes:      Conjunctiva/sclera: Conjunctivae normal.   Pulmonary:      Effort: Pulmonary effort is normal. No respiratory distress.   Abdominal:      General: Abdomen is flat. There is no distension.   Musculoskeletal:         General: No swelling or deformity.      Right lower leg: No edema.      Left lower leg: No edema.   Skin:     General: Skin is warm and dry.      Coloration: Skin is not jaundiced.   Neurological:      Mental Status: She is alert and oriented to person, place, and time. Mental status is at baseline.   Psychiatric:         Attention and Perception: Attention and perception normal.         Mood and Affect: Mood normal.         Speech: Speech normal.         Behavior: Behavior normal. Behavior is cooperative.         Thought Content: Thought content normal.         Cognition and Memory: Cognition normal.         Judgment: Judgment normal.         Assessment & Plan   1. Hyponatremia  Assessment & Plan:  Labs from last month showed stable hyponatremia. Advised she follow up with nephrology.      2. Hyperlipidemia, unspecified hyperlipidemia type  -     pravastatin (PRAVACHOL) 20 MG tablet; Take 1 tablet (20 mg total) by mouth every evening.  Dispense: 90 tablet; Refill: 3    3. Drug-induced insomnia  Assessment & Plan:  Stable. Continue Ambien. Reviewed side effects and complications.      4. Anxiety  Assessment & Plan:  Patient says she will sometimes take Xanax twice a day, but lately she hasn't even needed it every day. Continue medication. Reviewed side effects and complications.      5. Lumbosacral spondylosis without myelopathy  Assessment & Plan:  Pain management doctor increased Norco pills to from 7.5mg to 10mg which the patient cuts in half and takes maybe once daily. Continue to monitor.      6. Essential hypertension  Assessment & Plan:  Patient has been having home readings with systolics in 100s and diastolics frequently <  60. Stop amlodipine. Hopefully, long acting olmesartan will provide more steady control of blood pressure.    Orders:  -     olmesartan (BENICAR) 20 MG tablet; Take 1 tablet (20 mg total) by mouth once daily.  Dispense: 30 tablet; Refill: 11        Follow up in 6 months.     Terence Phillip MD  10/22/2024

## 2024-10-22 NOTE — ASSESSMENT & PLAN NOTE
Patient says she will sometimes take Xanax twice a day, but lately she hasn't even needed it every day. Continue medication. Reviewed side effects and complications.

## 2024-10-22 NOTE — ASSESSMENT & PLAN NOTE
Pain management doctor increased Norco pills to from 7.5mg to 10mg which the patient cuts in half and takes maybe once daily. Continue to monitor.

## 2024-10-22 NOTE — ASSESSMENT & PLAN NOTE
Patient has been having home readings with systolics in 100s and diastolics frequently < 60. Stop amlodipine. Hopefully, long acting olmesartan will provide more steady control of blood pressure.

## 2024-11-01 ENCOUNTER — PATIENT MESSAGE (OUTPATIENT)
Dept: FAMILY MEDICINE | Facility: CLINIC | Age: 79
End: 2024-11-01
Payer: MEDICARE

## 2024-11-02 NOTE — TELEPHONE ENCOUNTER
Please call patient and see what her recent blood pressures and heart rates have been and if she is feeling dizzy, short of breath, chest pain, or about to faint.  If her pressures are low and she is symptomatic, we can call 911.  Thanks!  Also, please direct her to call our main number for nurse triage in the future for further help.      Munira Doherty PA-C

## 2024-11-03 ENCOUNTER — OFFICE VISIT (OUTPATIENT)
Dept: URGENT CARE | Facility: CLINIC | Age: 79
End: 2024-11-03
Payer: MEDICARE

## 2024-11-03 VITALS
HEIGHT: 61 IN | WEIGHT: 107 LBS | OXYGEN SATURATION: 97 % | TEMPERATURE: 98 F | RESPIRATION RATE: 16 BRPM | DIASTOLIC BLOOD PRESSURE: 68 MMHG | BODY MASS INDEX: 20.2 KG/M2 | HEART RATE: 68 BPM | SYSTOLIC BLOOD PRESSURE: 119 MMHG

## 2024-11-03 DIAGNOSIS — N30.00 ACUTE CYSTITIS WITHOUT HEMATURIA: ICD-10-CM

## 2024-11-03 DIAGNOSIS — R30.0 DYSURIA: Primary | ICD-10-CM

## 2024-11-03 LAB
BILIRUBIN, UA POC OHS: ABNORMAL
BLOOD, UA POC OHS: ABNORMAL
CLARITY, UA POC OHS: CLEAR
COLOR, UA POC OHS: ABNORMAL
GLUCOSE, UA POC OHS: 100
KETONES, UA POC OHS: NEGATIVE
LEUKOCYTES, UA POC OHS: ABNORMAL
NITRITE, UA POC OHS: POSITIVE
PH, UA POC OHS: 5
PROTEIN, UA POC OHS: >=300
SPECIFIC GRAVITY, UA POC OHS: 1.02
UROBILINOGEN, UA POC OHS: 1

## 2024-11-03 PROCEDURE — 99213 OFFICE O/P EST LOW 20 MIN: CPT | Mod: S$GLB,,, | Performed by: NURSE PRACTITIONER

## 2024-11-03 PROCEDURE — 81003 URINALYSIS AUTO W/O SCOPE: CPT | Mod: QW,S$GLB,, | Performed by: NURSE PRACTITIONER

## 2024-11-03 RX ORDER — NITROFURANTOIN 25; 75 MG/1; MG/1
100 CAPSULE ORAL 2 TIMES DAILY
Qty: 14 CAPSULE | Refills: 0 | Status: SHIPPED | OUTPATIENT
Start: 2024-11-03 | End: 2024-11-10

## 2024-11-03 NOTE — PROGRESS NOTES
"Subjective:      Patient ID: Luis Miguel Murcia is a 79 y.o. female.    Vitals:  height is 5' 1" (1.549 m) and weight is 48.5 kg (107 lb). Her oral temperature is 97.7 °F (36.5 °C). Her blood pressure is 119/68 and her pulse is 68. Her respiration is 16 and oxygen saturation is 97%.     Chief Complaint: Urinary Tract Infection    Patient is present in the clinic with symptoms of UTI. Symptoms of burning and urgency. Treating symptoms with AZO.     Urinary Tract Infection   This is a new problem. The current episode started yesterday. The problem has been unchanged. The quality of the pain is described as burning and aching. The pain is at a severity of 8/10. The pain is moderate. There has been no fever. The fever has been present for Less than 1 day. She is Not sexually active. There is No history of pyelonephritis. Associated symptoms include frequency, urgency and withholding. Pertinent negatives include no behavior changes, chills, discharge, flank pain, hematuria, hesitancy, nausea, possible pregnancy, sweats, vomiting, weight loss, bubble bath use, constipation or rash. She has tried home medications for the symptoms. The treatment provided no relief. There is no history of catheterization, diabetes insipidus, diabetes mellitus, genitourinary reflux, hypertension, kidney stones, recurrent UTIs, a single kidney, STD, urinary stasis or a urological procedure.       Constitution: Negative for chills.   Gastrointestinal:  Negative for nausea, vomiting and constipation.   Genitourinary:  Positive for frequency and urgency. Negative for flank pain and hematuria.   Skin:  Negative for rash.      Objective:     Physical Exam   Constitutional: She is oriented to person, place, and time. She appears well-developed.   HENT:   Head: Normocephalic and atraumatic.   Ears:   Right Ear: External ear normal.   Left Ear: External ear normal.   Nose: Nose normal.   Mouth/Throat: Mucous membranes are normal.   Eyes: " Conjunctivae and lids are normal.   Neck: Trachea normal. Neck supple.   Cardiovascular: Normal rate, regular rhythm and normal heart sounds.   Pulmonary/Chest: Effort normal and breath sounds normal. No respiratory distress.   Abdominal: Normal appearance and bowel sounds are normal. She exhibits no distension and no mass. Soft. There is abdominal tenderness in the suprapubic area.   Musculoskeletal: Normal range of motion.         General: Normal range of motion.   Neurological: She is alert and oriented to person, place, and time. She has normal strength.   Skin: Skin is warm, dry, intact, not diaphoretic and not pale.   Psychiatric: Her speech is normal and behavior is normal. Judgment and thought content normal.   Nursing note and vitals reviewed.      Assessment:     1. Dysuria    2. Acute cystitis without hematuria        Plan:       Dysuria  -     POCT Urinalysis(Instrument)    Acute cystitis without hematuria  -     nitrofurantoin, macrocrystal-monohydrate, (MACROBID) 100 MG capsule; Take 1 capsule (100 mg total) by mouth 2 (two) times daily. for 7 days  Dispense: 14 capsule; Refill: 0  -     CULTURE, URINE; Future; Expected date: 11/04/2024      Drink plenty of fluids  Tylenol/Ibuprofen if you can take it for pain  ED for any further complications    Go the lab tomorrow for a urine culture

## 2024-11-03 NOTE — Clinical Note
Drink plenty of fluids Tylenol/Ibuprofen if you can take it for pain ED for any further complications  Go the lab tomorrow for a urine culture

## 2024-11-04 ENCOUNTER — TELEPHONE (OUTPATIENT)
Dept: FAMILY MEDICINE | Facility: CLINIC | Age: 79
End: 2024-11-04
Payer: MEDICARE

## 2024-11-04 NOTE — TELEPHONE ENCOUNTER
"Spoke to pt.  Pt reports that she is currently dealing with UTI.  Pt reports BP has been up and down.  Pt reports at night her "heart hurts"  Pt states that that's when she takes her Olmesartan.  Pt states that she does not think that she should take her medication at night.  I asked for pt to take her current blood pressure.  Pt states that she has not taking meds this morning.pts current BP is 126/73 with a dull headache    She took meds last night.    Pt reports BP as been "low" her entire life.    Blood pressure readings as follows for the last week as Dr. Phillip requested.    10/23/24   84/49 heart rate 73  117/60    10/24/24  123/72    10/25/24  114/58  136/64    10/26/24  114/62  102/54    10/27/24  139/67  117/58    10/28/24  100/50  123/95    10/29/24  136/67  136/66    10/30/24  102/58  81/47    10/31/24  97/48  145/74    11/1/24  91/52  119/68    11/2/24  122/61  100/51    11/3/24  119/68  118/75    11/4/24  126/73    "

## 2024-11-18 DIAGNOSIS — K21.9 LPRD (LARYNGOPHARYNGEAL REFLUX DISEASE): ICD-10-CM

## 2024-11-18 DIAGNOSIS — F19.982 DRUG-INDUCED INSOMNIA: ICD-10-CM

## 2024-11-18 DIAGNOSIS — F41.9 ANXIETY: ICD-10-CM

## 2024-11-18 RX ORDER — DEXLANSOPRAZOLE 60 MG/1
CAPSULE, DELAYED RELEASE ORAL
Qty: 90 CAPSULE | Refills: 3 | Status: SHIPPED | OUTPATIENT
Start: 2024-11-18

## 2024-11-18 NOTE — TELEPHONE ENCOUNTER
Care Due:                  Date            Visit Type   Department     Provider  --------------------------------------------------------------------------------                                EP -                              [facility] FAMILY  Last Visit: 10-      CARE ([facility])   MEDICINE       Terence Phillip                              EP -                              PRIMARY      [facility] FAM[facility]  Next Visit: 04-      CARE ([facility])   MEDICINE       Terence Phillip                                                            Last  Test          Frequency    Reason                     Performed    Due Date  --------------------------------------------------------------------------------    Lipid Panel.  12 months..  pravastatin..............  02- 02-    [facility] Embedded Care Due Messages. Reference number: [id number].   11/18/2024 11:53:54 AM CST

## 2024-11-19 ENCOUNTER — TELEPHONE (OUTPATIENT)
Dept: FAMILY MEDICINE | Facility: CLINIC | Age: 79
End: 2024-11-19
Payer: MEDICARE

## 2024-11-19 RX ORDER — ALPRAZOLAM 0.25 MG/1
0.25 TABLET ORAL 2 TIMES DAILY PRN
Qty: 60 TABLET | Refills: 4 | Status: SHIPPED | OUTPATIENT
Start: 2024-11-19

## 2024-11-19 RX ORDER — ZOLPIDEM TARTRATE 10 MG/1
10 TABLET ORAL NIGHTLY
Qty: 30 TABLET | Refills: 4 | Status: SHIPPED | OUTPATIENT
Start: 2024-11-19

## 2024-11-19 NOTE — TELEPHONE ENCOUNTER
Refill Routing Note   Medication(s) are not appropriate for processing by [facility] for the following reason(s):        Outside of protocol    [facility] action(s):  Approve  Route     Requires labs : Yes             Appointments  past 12m or future 3m with PCP    Date Provider   Last Visit   10/22/2024 Terence Phillip MD   Next Visit   4/23/2025 Terence Phillip MD   ED visits in past 90 days: 0        Note composed:6:31 PM 11/18/2024

## 2024-11-19 NOTE — TELEPHONE ENCOUNTER
----- Message from Ivette sent at 11/19/2024  2:51 PM CST -----  Type: Needs Medical Advice  Who Called:  Patient  Symptoms (please be specific):  BP up and down constantly (not staying normal)  How long has patient had these symptoms:  for the last couple of weeks  Pharmacy name and phone #:    [id number] - [place] [address] [address] [facility] [address] [facility]  [address] [address]  Phone: [phone] Fax: [phone]    Best Call Back Number: [phone]    Additional Information: Pt stated she is still having issues and even with the dosage change Dr Phillip requested it is not helping and she is calling to see if there is anyway we can have Dr Phillip call her back himself to discuss since he knows her history. Also, she is worried since she only has enough medication to last until tomorrow and doesn't know if they should refill that specific medication or try something else. Can we please check on this and call pt back to advise. Thank You.

## 2024-12-09 ENCOUNTER — NURSE TRIAGE (OUTPATIENT)
Dept: ADMINISTRATIVE | Facility: CLINIC | Age: 79
End: 2024-12-09
Payer: MEDICARE

## 2024-12-10 NOTE — TELEPHONE ENCOUNTER
Pt reports she missed her AM dose of medication, but did take the evening meds on time, having increased BPs ranging from 142/70 to 167/91, HR 59. Pt states it has never been that high before, pt denies any other symptoms at this time. Pt advised to see a MD within 3 days per protocol, offer made to try to schedule an appt with her PCP, but pt declined for now, wants to see if it go down as she is about to go to bed. Pt encouraged to call back with any worsening symptoms or questions. She verbalized understanding.    Reason for Disposition   Systolic BP  >= 160 OR Diastolic >= 100    Additional Information   Negative: Difficult to awaken or acting confused (e.g., disoriented, slurred speech)   Negative: SEVERE difficulty breathing (e.g., struggling for each breath, speaks in single words)   Negative: [1] Weakness of the face, arm or leg on one side of the body AND [2] new-onset   Negative: [1] Numbness (i.e., loss of sensation) of the face, arm or leg on one side of the body AND [2] new-onset   Negative: Sounds like a life-threatening emergency to the triager   Negative: [1] Chest pain lasts > 5 minutes AND [2] history of heart disease (i.e., heart attack, bypass surgery, angina, angioplasty, CHF)   Negative: [1] Chest pain AND [2] took nitroglycerin AND [3] pain was not relieved   Negative: [1] Systolic BP  >= 200 OR Diastolic >= 120 AND [2] having NO cardiac or neurologic symptoms   Negative: [1] Systolic BP  >= 160 OR Diastolic >= 100 AND [2] cardiac (e.g., breathing difficulty, chest pain) or neurologic symptoms (e.g., new-onset blurred or double vision, unsteady gait)   Negative: [1] Systolic BP  >= 180 OR Diastolic >= 110 AND [2] missed most recent dose of blood pressure medication   Negative: Systolic BP  >= 180 OR Diastolic >= 110   Negative: Ran out of BP medications    Protocols [facility]

## 2024-12-24 ENCOUNTER — OFFICE VISIT (OUTPATIENT)
Dept: OPHTHALMOLOGY | Facility: CLINIC | Age: 79
End: 2024-12-24
Payer: MEDICARE

## 2024-12-24 DIAGNOSIS — H25.812 COMBINED FORMS OF AGE-RELATED CATARACT OF LEFT EYE: ICD-10-CM

## 2024-12-24 PROCEDURE — 99999 PR PBB SHADOW E&M-EST. PATIENT-LVL III: CPT | Mod: PBBFAC,,, | Performed by: OPHTHALMOLOGY

## 2024-12-24 PROCEDURE — 99204 OFFICE O/P NEW MOD 45 MIN: CPT | Mod: S$GLB,,, | Performed by: OPHTHALMOLOGY

## 2024-12-24 PROCEDURE — 1159F MED LIST DOCD IN RCRD: CPT | Mod: CPTII,S$GLB,, | Performed by: OPHTHALMOLOGY

## 2024-12-24 PROCEDURE — 92136 OPHTHALMIC BIOMETRY: CPT | Mod: LT,S$GLB,, | Performed by: OPHTHALMOLOGY

## 2024-12-24 NOTE — PROGRESS NOTES
HPI     Cataract     Additional comments: Cataract Eval- OS           Comments    Referred by Dr. Patel    Combined forms of age-related cataract of left eye  Pseudophakia of right eye  Retinal macular atrophy/ retinal tear/hole repair in 2010 done at [facility]   Krista kang was a pt of Jonna Thayer      Gtt's:     Patient is here for cataract evaluation of left eye.      Pt states her va has been blurry in OS for a while now. She does have some   trouble with glare as well.          Last edited by Mary Montemayor MD on 12/24/2024 12:00 PM.            Assessment /Plan     For exam results, see Encounter Report.    Combined forms of age-related cataract of left eye  -     Ambulatory referral/consult to Ophthalmology  -     IOL Master - OU - Both Eyes       Pciol OD - WD    Visually significant nuclear sclerotic cataract    - Cataracts are interfering with activities of daily living, including night time driving.  - Pt desires cataract surgery for visual rehabilitation.   - Risks / benefits/ alternatives were discussed and patient agrees to proceed with surgery.   - IOL options discussed as well, according to patient's goals and concomitant ocular pathology.  - Target: plano.            19.0 RANGE  ORA    Patient has regular astigmatism that is visually significant and wishes to have an astigmatism correcting TORIC lens placed, and agrees to the out of pocket cost of $2000 per eye      Retinal macular atrophy/ retinal tear/hole repair in 2010 done at [facility] Krista kang was a pt of Jonna hTayer

## 2025-01-28 DIAGNOSIS — E78.5 HYPERLIPIDEMIA, UNSPECIFIED HYPERLIPIDEMIA TYPE: ICD-10-CM

## 2025-01-28 RX ORDER — PRAVASTATIN SODIUM 20 MG/1
20 TABLET ORAL NIGHTLY
Qty: 90 TABLET | Refills: 3 | OUTPATIENT
Start: 2025-01-28

## 2025-01-28 RX ORDER — PRAVASTATIN SODIUM 20 MG/1
20 TABLET ORAL NIGHTLY
Qty: 90 TABLET | Refills: 3 | Status: SHIPPED | OUTPATIENT
Start: 2025-01-28

## 2025-01-28 NOTE — TELEPHONE ENCOUNTER
Care Due:                  Date            Visit Type   Department     Provider  --------------------------------------------------------------------------------                                EP -                              [facility] FAMILY  Last Visit: 10-      CARE ([facility])   MEDICINE       Terence Phillip                               -                              [facility]  Next Visit: 04-      CARE ([facility])   MEDICINE       Terence Phillip                                                            Last  Test          Frequency    Reason                     Performed    Due Date  --------------------------------------------------------------------------------    CMP.........  12 months..  pravastatin..............  04- 04-    Lipid Panel.  12 months..  pravastatin..............  02- 02-    Mg Level....  12 months..  ibandronate..............  04- 04-    Health [facility] Embedded Care Due Messages. Reference number: [id number].   1/28/2025 9:53:11 AM CST

## 2025-01-28 NOTE — TELEPHONE ENCOUNTER
No care due was identified.  Health [facility] Embedded Care Due Messages. Reference number: [id number].   1/28/2025 9:54:39 AM CST

## 2025-01-28 NOTE — TELEPHONE ENCOUNTER
Refill Routing Note   Medication(s) are not appropriate for processing by [facility] for the following reason(s):        Required labs outdated    [facility] action(s):  Defer     Requires labs : Yes             Appointments  past 12m or future 3m with PCP    Date Provider   Last Visit   10/22/2024 Terence Phillip MD   Next Visit   4/23/2025 Terence Phillip MD   ED visits in past 90 days: 0        Note composed:2:17 PM 01/28/2025

## 2025-01-28 NOTE — TELEPHONE ENCOUNTER
Refill Decision Note   Luis Miguel Divina  is requesting a refill authorization.  Brief Assessment and Rationale for Refill:  Quick Discontinue     Medication Therapy Plan:  Duplicate      Comments:     Note composed:2:17 PM 01/28/2025

## 2025-01-31 ENCOUNTER — OFFICE VISIT (OUTPATIENT)
Dept: CARDIOLOGY | Facility: CLINIC | Age: 80
End: 2025-01-31
Payer: MEDICARE

## 2025-01-31 VITALS
WEIGHT: 107.38 LBS | DIASTOLIC BLOOD PRESSURE: 69 MMHG | SYSTOLIC BLOOD PRESSURE: 118 MMHG | HEART RATE: 64 BPM | HEIGHT: 60 IN | BODY MASS INDEX: 21.08 KG/M2

## 2025-01-31 DIAGNOSIS — R09.89 SYMPTOMS INVOLVING CARDIOVASCULAR SYSTEM: ICD-10-CM

## 2025-01-31 DIAGNOSIS — E78.5 HYPERLIPIDEMIA, UNSPECIFIED HYPERLIPIDEMIA TYPE: ICD-10-CM

## 2025-01-31 DIAGNOSIS — I70.0 ATHEROSCLEROSIS OF AORTA: Primary | ICD-10-CM

## 2025-01-31 DIAGNOSIS — I10 ESSENTIAL HYPERTENSION: ICD-10-CM

## 2025-01-31 DIAGNOSIS — I70.219 ATHEROSCLEROSIS OF NATIVE ARTERY OF EXTREMITY WITH INTERMITTENT CLAUDICATION, UNSPECIFIED EXTREMITY: ICD-10-CM

## 2025-01-31 PROCEDURE — 1126F AMNT PAIN NOTED NONE PRSNT: CPT | Mod: CPTII,S$GLB,, | Performed by: INTERNAL MEDICINE

## 2025-01-31 PROCEDURE — 1159F MED LIST DOCD IN RCRD: CPT | Mod: CPTII,S$GLB,, | Performed by: INTERNAL MEDICINE

## 2025-01-31 PROCEDURE — 3288F FALL RISK ASSESSMENT DOCD: CPT | Mod: CPTII,S$GLB,, | Performed by: INTERNAL MEDICINE

## 2025-01-31 PROCEDURE — 3078F DIAST BP <80 MM HG: CPT | Mod: CPTII,S$GLB,, | Performed by: INTERNAL MEDICINE

## 2025-01-31 PROCEDURE — 99214 OFFICE O/P EST MOD 30 MIN: CPT | Mod: S$GLB,,, | Performed by: INTERNAL MEDICINE

## 2025-01-31 PROCEDURE — 1101F PT FALLS ASSESS-DOCD LE1/YR: CPT | Mod: CPTII,S$GLB,, | Performed by: INTERNAL MEDICINE

## 2025-01-31 PROCEDURE — 99999 PR PBB SHADOW E&M-EST. PATIENT-LVL III: CPT | Mod: PBBFAC,,, | Performed by: INTERNAL MEDICINE

## 2025-01-31 PROCEDURE — 3074F SYST BP LT 130 MM HG: CPT | Mod: CPTII,S$GLB,, | Performed by: INTERNAL MEDICINE
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
## 2025-02-14 DIAGNOSIS — H25.812 COMBINED FORMS OF AGE-RELATED CATARACT OF LEFT EYE: Primary | ICD-10-CM

## 2025-02-17 RX ORDER — OFLOXACIN 3 MG/ML
1 SOLUTION/ DROPS OPHTHALMIC 3 TIMES DAILY
Qty: 5 ML | Refills: 3 | Status: SHIPPED | OUTPATIENT
Start: 2025-02-17

## 2025-02-17 RX ORDER — PREDNISOLONE ACETATE 10 MG/ML
1 SUSPENSION/ DROPS OPHTHALMIC 3 TIMES DAILY
Qty: 5 ML | Refills: 0 | Status: SHIPPED | OUTPATIENT
Start: 2025-02-17

## 2025-02-17 RX ORDER — KETOROLAC TROMETHAMINE 5 MG/ML
1 SOLUTION OPHTHALMIC 3 TIMES DAILY
Qty: 5 ML | Refills: 3 | Status: SHIPPED | OUTPATIENT
Start: 2025-02-17

## 2025-02-17 NOTE — TELEPHONE ENCOUNTER
No care due was identified.  Health [facility] Embedded Care Due Messages. Reference number: [id number].   2/17/2025 5:55:19 PM CST

## 2025-02-18 RX ORDER — GABAPENTIN 600 MG/1
600 TABLET ORAL NIGHTLY
Qty: 30 TABLET | Refills: 2 | Status: SHIPPED | OUTPATIENT
Start: 2025-02-18

## 2025-02-26 ENCOUNTER — PATIENT MESSAGE (OUTPATIENT)
Dept: CARDIOLOGY | Facility: CLINIC | Age: 80
End: 2025-02-26
Payer: MEDICARE

## 2025-02-27 NOTE — TELEPHONE ENCOUNTER
Daughter called in and asked if I would call the patient. Blood pressure for the last month has been 90-110s/45-50s. Heart rate in the 50s. She feels like she is going to pass out with too much activity. This morning /54. She says she drinks a lot of fluids during the day.

## 2025-03-13 ENCOUNTER — TELEPHONE (OUTPATIENT)
Dept: CARDIOLOGY | Facility: CLINIC | Age: 80
End: 2025-03-13
Payer: MEDICARE

## 2025-03-13 NOTE — TELEPHONE ENCOUNTER
----- Message from Elsa sent at 3/12/2025  4:58 PM CDT -----  Type:  Patient Returning CallWho Called:  pt Who Left Message for Patient:  devonte Does the patient know what this is regarding?:  Marco A Call Back Number:  [phone] (home) Additional Information:  please advise

## 2025-03-13 NOTE — TELEPHONE ENCOUNTER
Pt asks that we contact her by phone. She doesn't use the portal, her daughter does and she does not want her daughter managing her medical care

## 2025-03-13 NOTE — TELEPHONE ENCOUNTER
Spoke to pt and reviewed blood pressure medication: she is to stop carvedilol and continue olmesatan 20mg daily. She takes it at night.

## 2025-03-21 ENCOUNTER — TELEPHONE (OUTPATIENT)
Dept: CARDIOLOGY | Facility: CLINIC | Age: 80
End: 2025-03-21

## 2025-03-21 ENCOUNTER — ANESTHESIA EVENT (OUTPATIENT)
Dept: SURGERY | Facility: HOSPITAL | Age: 80
End: 2025-03-21
Payer: MEDICARE

## 2025-03-21 NOTE — TELEPHONE ENCOUNTER
----- Message from Bianca sent at 3/21/2025 10:06 AM CDT -----  Contact: pt @ [phone]  Luis Miguel Murcia calling regarding Patient Advice (message) for #pt is calling to speak with someone in office concerning blood pressure reading, asking for call back

## 2025-03-21 NOTE — TELEPHONE ENCOUNTER
Pt calling in BPs since med change (stopped Coreg). Averaging 110s/60s.heart rate 60-70s. This morning /69 hr 70. She says she feels much better.

## 2025-03-24 ENCOUNTER — HOSPITAL ENCOUNTER (OUTPATIENT)
Facility: HOSPITAL | Age: 80
Discharge: HOME | End: 2025-03-24
Attending: OPHTHALMOLOGY | Admitting: OPHTHALMOLOGY
Payer: MEDICARE

## 2025-03-24 ENCOUNTER — ANESTHESIA (OUTPATIENT)
Dept: SURGERY | Facility: HOSPITAL | Age: 80
End: 2025-03-24
Payer: MEDICARE

## 2025-03-24 VITALS
OXYGEN SATURATION: 99 % | TEMPERATURE: 97 F | BODY MASS INDEX: 21.01 KG/M2 | WEIGHT: 107 LBS | RESPIRATION RATE: 16 BRPM | SYSTOLIC BLOOD PRESSURE: 126 MMHG | DIASTOLIC BLOOD PRESSURE: 60 MMHG | HEIGHT: 60 IN | HEART RATE: 60 BPM

## 2025-03-24 DIAGNOSIS — H25.13 NUCLEAR SCLEROTIC CATARACT OF BOTH EYES: Primary | ICD-10-CM

## 2025-03-24 DIAGNOSIS — H25.10 SENILE NUCLEAR SCLEROSIS: ICD-10-CM

## 2025-03-24 PROCEDURE — 37000008 HC ANESTHESIA 1ST 15 MINUTES: Mod: PO | Performed by: OPHTHALMOLOGY

## 2025-03-24 PROCEDURE — 71000015 HC POSTOP RECOV 1ST HR: Mod: PO | Performed by: OPHTHALMOLOGY

## 2025-03-24 PROCEDURE — 25000003 PHARM REV CODE 250: Mod: PO | Performed by: OPHTHALMOLOGY

## 2025-03-24 PROCEDURE — 25000003 PHARM REV CODE 250: Mod: PO

## 2025-03-24 PROCEDURE — 36000707: Mod: PO | Performed by: OPHTHALMOLOGY

## 2025-03-24 PROCEDURE — 99152 MOD SED SAME PHYS/QHP 5/>YRS: CPT | Mod: ,,, | Performed by: OPHTHALMOLOGY

## 2025-03-24 PROCEDURE — 37000009 HC ANESTHESIA EA ADD 15 MINS: Mod: PO | Performed by: OPHTHALMOLOGY

## 2025-03-24 PROCEDURE — 36000706: Mod: PO | Performed by: OPHTHALMOLOGY

## 2025-03-24 PROCEDURE — D9220A PRA ANESTHESIA: Mod: ANES,,, | Performed by: ANESTHESIOLOGY

## 2025-03-24 PROCEDURE — 63600175 PHARM REV CODE 636 W HCPCS: Mod: PO | Performed by: ANESTHESIOLOGY

## 2025-03-24 PROCEDURE — V2787 ASTIGMATISM-CORRECT FUNCTION: HCPCS | Mod: PO | Performed by: OPHTHALMOLOGY

## 2025-03-24 PROCEDURE — 66982 XCAPSL CTRC RMVL CPLX WO ECP: CPT | Mod: LT,,, | Performed by: OPHTHALMOLOGY

## 2025-03-24 PROCEDURE — 66999 UNLISTED PX ANT SEGMENT EYE: CPT | Mod: CSM,,, | Performed by: OPHTHALMOLOGY

## 2025-03-24 PROCEDURE — 63600175 PHARM REV CODE 636 W HCPCS: Mod: PO | Performed by: NURSE ANESTHETIST, CERTIFIED REGISTERED

## 2025-03-24 PROCEDURE — 63600175 PHARM REV CODE 636 W HCPCS: Mod: PO | Performed by: OPHTHALMOLOGY

## 2025-03-24 PROCEDURE — D9220A PRA ANESTHESIA: Mod: CRNA,,, | Performed by: NURSE ANESTHETIST, CERTIFIED REGISTERED

## 2025-03-24 DEVICE — IMPLANTABLE DEVICE: Type: IMPLANTABLE DEVICE | Site: EYE | Status: FUNCTIONAL

## 2025-03-24 RX ORDER — LIDOCAINE HYDROCHLORIDE 40 MG/ML
INJECTION, SOLUTION RETROBULBAR
Status: DISCONTINUED | OUTPATIENT
Start: 2025-03-24 | End: 2025-03-24

## 2025-03-24 RX ORDER — TROPICAMIDE 10 MG/ML
1 SOLUTION/ DROPS OPHTHALMIC
Status: COMPLETED | OUTPATIENT
Start: 2025-03-24 | End: 2025-03-24

## 2025-03-24 RX ORDER — LIDOCAINE HYDROCHLORIDE 10 MG/ML
INJECTION, SOLUTION EPIDURAL; INFILTRATION; INTRACAUDAL; PERINEURAL
Status: DISCONTINUED | OUTPATIENT
Start: 2025-03-24 | End: 2025-03-24 | Stop reason: HOSPADM

## 2025-03-24 RX ORDER — FENTANYL CITRATE 50 UG/ML
INJECTION, SOLUTION INTRAMUSCULAR; INTRAVENOUS
Status: DISCONTINUED | OUTPATIENT
Start: 2025-03-24 | End: 2025-03-24

## 2025-03-24 RX ORDER — SODIUM CHLORIDE, SODIUM LACTATE, POTASSIUM CHLORIDE, CALCIUM CHLORIDE 600; 310; 30; 20 MG/100ML; MG/100ML; MG/100ML; MG/100ML
125 INJECTION, SOLUTION INTRAVENOUS CONTINUOUS
Status: DISCONTINUED | OUTPATIENT
Start: 2025-03-24 | End: 2025-03-24 | Stop reason: HOSPADM

## 2025-03-24 RX ORDER — PROPARACAINE HYDROCHLORIDE 5 MG/ML
1 SOLUTION/ DROPS OPHTHALMIC
Status: DISCONTINUED | OUTPATIENT
Start: 2025-03-24 | End: 2025-03-24 | Stop reason: HOSPADM

## 2025-03-24 RX ORDER — MIDAZOLAM HYDROCHLORIDE 1 MG/ML
INJECTION INTRAMUSCULAR; INTRAVENOUS
Status: DISCONTINUED | OUTPATIENT
Start: 2025-03-24 | End: 2025-03-24

## 2025-03-24 RX ORDER — ONDANSETRON HYDROCHLORIDE 2 MG/ML
4 INJECTION, SOLUTION INTRAVENOUS DAILY PRN
Status: DISCONTINUED | OUTPATIENT
Start: 2025-03-24 | End: 2025-03-24 | Stop reason: HOSPADM

## 2025-03-24 RX ORDER — ACETAMINOPHEN 325 MG/1
650 TABLET ORAL EVERY 4 HOURS PRN
Status: DISCONTINUED | OUTPATIENT
Start: 2025-03-24 | End: 2025-03-24 | Stop reason: HOSPADM

## 2025-03-24 RX ORDER — ONDANSETRON HYDROCHLORIDE 2 MG/ML
INJECTION, SOLUTION INTRAVENOUS
Status: DISCONTINUED | OUTPATIENT
Start: 2025-03-24 | End: 2025-03-24

## 2025-03-24 RX ORDER — MOXIFLOXACIN 5 MG/ML
SOLUTION/ DROPS OPHTHALMIC
Status: DISCONTINUED | OUTPATIENT
Start: 2025-03-24 | End: 2025-03-24 | Stop reason: HOSPADM

## 2025-03-24 RX ORDER — MOXIFLOXACIN 5 MG/ML
1 SOLUTION/ DROPS OPHTHALMIC
Status: COMPLETED | OUTPATIENT
Start: 2025-03-24 | End: 2025-03-24

## 2025-03-24 RX ORDER — SODIUM CHLORIDE, SODIUM LACTATE, POTASSIUM CHLORIDE, CALCIUM CHLORIDE 600; 310; 30; 20 MG/100ML; MG/100ML; MG/100ML; MG/100ML
INJECTION, SOLUTION INTRAVENOUS CONTINUOUS
Status: DISCONTINUED | OUTPATIENT
Start: 2025-03-24 | End: 2025-03-24 | Stop reason: HOSPADM

## 2025-03-24 RX ORDER — PREDNISOLONE ACETATE 10 MG/ML
SUSPENSION/ DROPS OPHTHALMIC
Status: DISCONTINUED | OUTPATIENT
Start: 2025-03-24 | End: 2025-03-24 | Stop reason: HOSPADM

## 2025-03-24 RX ORDER — PHENYLEPHRINE HYDROCHLORIDE 25 MG/ML
1 SOLUTION/ DROPS OPHTHALMIC
Status: COMPLETED | OUTPATIENT
Start: 2025-03-24 | End: 2025-03-24

## 2025-03-24 RX ORDER — LIDOCAINE HYDROCHLORIDE 40 MG/ML
INJECTION, SOLUTION RETROBULBAR
Status: DISCONTINUED | OUTPATIENT
Start: 2025-03-24 | End: 2025-03-24 | Stop reason: HOSPADM

## 2025-03-24 RX ORDER — LIDOCAINE HYDROCHLORIDE 10 MG/ML
1 INJECTION, SOLUTION EPIDURAL; INFILTRATION; INTRACAUDAL; PERINEURAL ONCE
Status: DISCONTINUED | OUTPATIENT
Start: 2025-03-24 | End: 2025-03-24 | Stop reason: HOSPADM

## 2025-03-24 RX ORDER — CYCLOP/TROP/PROPA/PHEN/KET/WAT 1-1-0.1%
1 DROPS (EA) OPHTHALMIC (EYE)
Status: DISCONTINUED | OUTPATIENT
Start: 2025-03-24 | End: 2025-03-24 | Stop reason: HOSPADM

## 2025-03-24 RX ADMIN — MOXIFLOXACIN 1 DROP: 5 SOLUTION/ DROPS OPHTHALMIC at 08:03

## 2025-03-24 RX ADMIN — PROPARACAINE HYDROCHLORIDE 1 DROP: 5 SOLUTION/ DROPS OPHTHALMIC at 08:03

## 2025-03-24 RX ADMIN — TROPICAMIDE 1 DROP: 10 SOLUTION/ DROPS OPHTHALMIC at 08:03

## 2025-03-24 RX ADMIN — PHENYLEPHRINE HYDROCHLORIDE 1 DROP: 25 SOLUTION/ DROPS OPHTHALMIC at 08:03

## 2025-03-24 RX ADMIN — ONDANSETRON 8 MG: 2 INJECTION, SOLUTION INTRAMUSCULAR; INTRAVENOUS at 09:03

## 2025-03-24 RX ADMIN — LIDOCAINE HYDROCHLORIDE 4 DROP: 40 SOLUTION RETROBULBAR; TOPICAL at 09:03

## 2025-03-24 RX ADMIN — MIDAZOLAM HYDROCHLORIDE 2 MG: 1 INJECTION, SOLUTION INTRAMUSCULAR; INTRAVENOUS at 09:03

## 2025-03-24 RX ADMIN — SODIUM CHLORIDE, POTASSIUM CHLORIDE, SODIUM LACTATE AND CALCIUM CHLORIDE: 600; 310; 30; 20 INJECTION, SOLUTION INTRAVENOUS at 08:03

## 2025-03-24 RX ADMIN — FENTANYL CITRATE 50 MCG: 0.05 INJECTION, SOLUTION INTRAMUSCULAR; INTRAVENOUS at 09:03

## 2025-03-24 NOTE — BRIEF OP NOTE
BRIEF DISCHARGE NOTE:    Date of discharge: 03/24/2025    Reason for hospitalization -  Cataract surgery     Final Diagnosis - Visually significant Cataract    Procedures and treatment provided - Status post phacoemulsification with placement of intraocular lens     Diet - Advance to regular as tolerated    Activity - as tolerated    Disposition at the end of the case - Good.    Discharge: to home    The patient tolerated the procedure well and knows to follow up with me tomorrow in the eye clinic, sooner if needed.    Patient and family instructions (as appropriate) - Given to patient on discharge    Mary Montemayor MD

## 2025-03-24 NOTE — OP NOTE
DATE OF PROCEDURE: 03/24/2025    SURGEON: CORA SERNA MD    PREOPERATIVE DIAGNOSIS:  1. Senile nuclear sclerotic cataract left eye. 2. Poor mydriasis secondary to floppy iris syndrome left eye    POSTOPERATIVE DIAGNOSIS: 1.Senile nuclear sclerotic cataract left eye. 2. Poor mydriasis secondary to floppy iris syndrome left eye    PROCEDURE PERFORMED:  Complex phacoemulsification with placement of TORIC intraocular lens, left eye, with placement of a Malyugin ring. ([id number])       IMPLANT:  [id number] 19.0    ANESTHESIA:  Moderate sedation with topical Lidocaine.     COMPLICATIONS: none    ESTIMATED BLOOD LOSS: <1cc    SPECIMENS: none    INDICATIONS FOR PROCEDURE:   The patient has a history of painless progressive vision loss.  The patient has described difficulties with activities of daily living, which specifically include driving, which is secondary to cataract formation and progression. After we had a thorough discussion about risks, benefits, and alternatives to cataract surgery, the patient agreed to proceed with phacoemulsification and implantation of a lens in the left eye.  These risks include, but are not limited to, hemorrhage, pain, infection, need for additional surgery, need for glasses or contacts, loss of vision, or even loss of the eye.      PROCEDURE IN DETAIL:  The patient was met in the preop holding area.  Consent was confirmed to be signed.  The operative site was marked.  The patient was brought into the operating room by the anesthesia team and placed under monitored anesthesia care.  The left eye was prepped and draped in a sterile ophthalmic fashion.  A Jaye speculum was placed into the left eye.   A paracentesis site was made and 1% preservative-free lidocaine was injected into the anterior chamber.  Viscoelastic  material was injected into the anterior chamber.  A keratome blade was used to make a clear corneal incision. The malyugin ring was inserted and positioned into place, assisting  with pupillary dilation.  A cystotome was used to initiate the continuous curvilinear capsulorrhexis which was completed with Utrata forceps.  BSS on a sauceda cannula was used to perform hydrodissection.  The phacoemulsification tip was introduced into the eye and the nucleus was removed in a standard divide-and-conquer fashion.  Remaining cortical material was removed from the eye using irrigation-aspiration.  The capsular bag was filled with viscoelastic material and the intraocular lens was injected and positioned into place. The Malyugin ring was removed from the eye. Remaining viscoelastic material was removed from the eye using irrigation and aspiration.  The corneal wounds were hydrated.  The eye was filled to physiologic pressure. The wounds were found to be watertight. Drops of Vigamox and prednisilone were placed into the eye.  The eye was washed, dried, and shielded.  The patient tolerated the procedure well and knows to follow up with me tomorrow morning, sooner if needed.     Intraoperative aberrometer ([facility]) was used to confirm calculations.

## 2025-03-24 NOTE — H&P
History    Chief complaint:  Painless progressive vision loss    Present Ilness/Diagnosis: Nuclear sclerotic Cataract    Past Medical History:  has a past medical history of Allergy, Anemia, Anxiety, Arthritis, Cataract, CHF (congestive heart failure) (11/2013), Chronic back pain, CKD (chronic kidney disease), stage III, Colon polyp, COPD (chronic obstructive pulmonary disease), DDD (degenerative disc disease), cervical, GERD (gastroesophageal reflux disease), Headache(784.0), HTN (hypertension), Hyperlipidemia, Insomnia, Low back ache, Neck pain, Osteopenia, Perforation of nasal septum (2013), Sciatica, Shingles (11/09/2023), Sinusitis, Stroke (07/04/2014), and Thrombocytosis.    Family History/Social History: refer to chart    Allergies:   Review of patient's allergies indicates:   Allergen Reactions    Penicillins Hives    Chlorhexidine Rash     Severe rash, redness and itching    Hydrochlorothiazide Other (See Comments)     hyponatremia       Current Medications: Current Medications[1]    ASA Score: II     Mallampati Score: II     Plan for Sedation: Moderate Sedation     Patient or Family History of Anesthesia problems : No    Physical Exam    BP: Vital signs stable  General: No apparent distress  HEENT: nuclear sclerotic cataract  Lungs: adequate respirations  Heart: + pulses  Abdomen: soft  Rectal/pelvic: deferred    Impression: Visually significant Cataract.    See previous clinic notes for surgical indications.    Plan: Phacoemulsification with implantation of Intraocular lens               [1] No current facility-administered medications for this encounter.

## 2025-03-24 NOTE — TRANSFER OF CARE
Anesthesia Transfer of Care Note    Patient: Luis Miguel Murcia    Procedure(s) Performed: Procedure(s) (LRB):  PHACOEMULSIFICATION, CATARACT, WITH IOL INSERTION (Left)    Patient location: PACU    Anesthesia Type: MAC    Transport from OR: Transported from OR on room air with adequate spontaneous ventilation    Post pain: adequate analgesia    Post assessment: no apparent anesthetic complications and tolerated procedure well    Post vital signs: stable    Level of consciousness: awake    Nausea/Vomiting: no nausea/vomiting    Complications: none    Transfer of care protocol was followed      Last vitals: Visit Vitals  /60   Pulse 60   Temp 36.3 °C (97.3 °F) (Skin)   Resp 15   Ht 5' (1.524 m)   Wt 48.5 kg (107 lb)   SpO2 99%   Breastfeeding No   BMI 20.90 kg/m²

## 2025-03-24 NOTE — ANESTHESIA PREPROCEDURE EVALUATION
03/24/2025  Luis Miguel Murcia is a 79 y.o., female.            Pre-op Assessment    I have reviewed the Patient Summary Reports.    I have reviewed the NPO Status.   I have reviewed the Medications.     Review of Systems  Anesthesia Hx:  No problems with previous Anesthesia                Social:  Non-Smoker       Cardiovascular:     Hypertension       CHF    hyperlipidemia    11/2023    Summary       ·  Left Ventricle: The left ventricle is normal in size. Normal wall thickness. Normal wall motion. There is normal systolic function with a visually estimated ejection fraction of 60 - 65%. Grade I diastolic dysfunction.  ·  Right Ventricle: Normal right ventricular cavity size. Wall thickness is normal. Right ventricle wall motion  is normal. Systolic function is normal.  ·  Aortic Valve: Moderately calcified cusps. There is mild stenosis. Aortic valve area by VTI is 1.53 cm². Aortic valve peak velocity is 2.55 m/s. Mean gradient is 16 mmHg. The dimensionless index is 0.54.          Congestive Heart Failure (CHF)                Hypertension         Pulmonary:   COPD         Chronic Obstructive Pulmonary Disease (COPD):                      Renal/:  Chronic Renal Disease, CKD        Kidney Function/Disease             Hepatic/GI:     GERD         Gerd          Musculoskeletal:  Arthritis          Spine Disorders: lumbar            Neurological:   CVA Neuromuscular Disease,  Headaches      Dx of Headaches              CVA - Cerebrovasular Accident               Neuromuscular Disease       Physical Exam  General: Well nourished and Cooperative    Airway:  Mallampati: II   Mouth Opening: Normal  TM Distance: 4 - 6 cm  Neck ROM: Normal ROM    Dental:  Intact        Anesthesia Plan  Type of Anesthesia, risks & benefits discussed:    Anesthesia Type: MAC  Intra-op Monitoring Plan: Standard ASA  Monitors  Induction:  IV  Informed Consent: Informed consent signed with the Patient and all parties understand the risks and agree with anesthesia plan.  All questions answered.   ASA Score: 3  Day of Surgery Review of History & Physical: H&P Update referred to the surgeon/provider.    Ready For Surgery From Anesthesia Perspective.     .

## 2025-03-24 NOTE — DISCHARGE INSTRUCTIONS
Cataract Surgery     Post-Operative Instructions       Resume eye drops three times daily into operative eye.     Bring your eye drops to your follow-up appointment.     Wear protective sunglasses during the day. Wear eye shield when going to bed for 1 week.     Resume moderate activity.     Bathe/shower/wash your face as normal.     Do not rub your eye.   NO HEAVY LIFTING or bending.    Do not apply makeup around the operative eye for 1 week.        You should expect:     Blurry vision and halos for 24-48 hours.     Dilated pupil for 24-48 hours.     Scratchy feeling in the eye for 1-2 days.     Curved shadow in your peripheral vision for 2-3 weeks.     Occasional flickering of lights for up to 1 week.        If you experience severe pain or nausea, please call Dr. Montemayor or the on-call doctor at [phone].        Plan to see Dr. Montemayor tomorrow at ___________.     [facility] [address] [place]. [address]     2nd floor, Entrance # 1     Most patients can drive the next morning. If you do not feel comfortable driving, please arrange for transportation.

## 2025-03-24 NOTE — ANESTHESIA POSTPROCEDURE EVALUATION
Anesthesia Post Evaluation    Patient: Luis Miguel Murcia    Procedure(s) Performed: Procedure(s) (LRB):  PHACOEMULSIFICATION, CATARACT, WITH IOL INSERTION (Left)    Final Anesthesia Type: MAC      Patient location during evaluation: PACU  Patient participation: Yes- Able to Participate  Level of consciousness: awake and alert  Post-procedure vital signs: reviewed and stable  Pain management: adequate  Airway patency: patent    PONV status at discharge: No PONV  Anesthetic complications: no      Cardiovascular status: blood pressure returned to baseline  Respiratory status: unassisted  Hydration status: euvolemic  Follow-up not needed.              Vitals Value Taken Time   /60 03/24/25 10:06   Temp  03/24/25 12:35   Pulse 60 03/24/25 10:06   Resp 16 03/24/25 10:06   SpO2 99 % 03/24/25 10:06         Event Time   Out of Recovery 10:07:15         Pain/Dalila Score: Dalila Score: 10 (3/24/2025 10:36 AM)

## 2025-03-25 ENCOUNTER — OFFICE VISIT (OUTPATIENT)
Dept: OPHTHALMOLOGY | Facility: CLINIC | Age: 80
End: 2025-03-25
Payer: MEDICARE

## 2025-03-25 DIAGNOSIS — Z96.1 STATUS POST CATARACT EXTRACTION AND INSERTION OF INTRAOCULAR LENS, LEFT: Primary | ICD-10-CM

## 2025-03-25 DIAGNOSIS — Z98.42 STATUS POST CATARACT EXTRACTION AND INSERTION OF INTRAOCULAR LENS, LEFT: Primary | ICD-10-CM

## 2025-03-25 PROCEDURE — 99024 POSTOP FOLLOW-UP VISIT: CPT | Mod: S$GLB,,, | Performed by: OPHTHALMOLOGY

## 2025-03-25 PROCEDURE — 1101F PT FALLS ASSESS-DOCD LE1/YR: CPT | Mod: CPTII,S$GLB,, | Performed by: OPHTHALMOLOGY

## 2025-03-25 PROCEDURE — 1159F MED LIST DOCD IN RCRD: CPT | Mod: CPTII,S$GLB,, | Performed by: OPHTHALMOLOGY

## 2025-03-25 PROCEDURE — 99999 PR PBB SHADOW E&M-EST. PATIENT-LVL III: CPT | Mod: PBBFAC,,, | Performed by: OPHTHALMOLOGY

## 2025-03-25 PROCEDURE — 1126F AMNT PAIN NOTED NONE PRSNT: CPT | Mod: CPTII,S$GLB,, | Performed by: OPHTHALMOLOGY

## 2025-03-25 PROCEDURE — 3288F FALL RISK ASSESSMENT DOCD: CPT | Mod: CPTII,S$GLB,, | Performed by: OPHTHALMOLOGY

## 2025-03-25 NOTE — PROGRESS NOTES
HPI    Referred by Dr. Patel     Combined forms of age-related cataract of left eye   Pseudophakia of right eye   Retinal macular atrophy/ retinal tear/hole [facility] in 2010 [facility]   Krista kang was a pt of Jonna Thayer   S/p phaco OS 03/24/2025      Gtt's:         Pt is here for a 1 day PO. Pt states no eye pain or discomfort this   morning.   Last edited by Kristofer Slaughter on 3/25/2025 10:42 AM.            Assessment /Plan     For exam results, see Encounter Report.    Status post cataract extraction and insertion of intraocular lens, left      Slit Lamp Exam  L/L - normal  C/s - quiet  Cornea - clear  A/C - 1+ cell  Lens - PCIOL    POD #1 s/p phaco/IOL  - doing well  - continue the following drops:    vigamox or ocuflox TID x 1 wk then stop  Pred forte TID x  4 wks  Ketorolac TID until runs out    Versus:    Combination drop - 1 drop TID x total of 1 month    Appropriate precautions and post op medications reviewed.  Patient instructed to call or come in if symptoms of redness, decreased vision, or pain are experienced.    -f/up 1-2 wks, sooner PRN. Or 4 wks with optom for mrx if needed.

## 2025-04-03 ENCOUNTER — OFFICE VISIT (OUTPATIENT)
Dept: OPTOMETRY | Facility: CLINIC | Age: 80
End: 2025-04-03
Payer: MEDICARE

## 2025-04-03 DIAGNOSIS — Z98.42 STATUS POST CATARACT EXTRACTION AND INSERTION OF INTRAOCULAR LENS, LEFT: Primary | ICD-10-CM

## 2025-04-03 DIAGNOSIS — Z96.1 STATUS POST CATARACT EXTRACTION AND INSERTION OF INTRAOCULAR LENS, LEFT: Primary | ICD-10-CM

## 2025-04-03 PROCEDURE — 3288F FALL RISK ASSESSMENT DOCD: CPT | Mod: CPTII,S$GLB,,

## 2025-04-03 PROCEDURE — 99999 PR PBB SHADOW E&M-EST. PATIENT-LVL II: CPT | Mod: PBBFAC,,,

## 2025-04-03 PROCEDURE — 1101F PT FALLS ASSESS-DOCD LE1/YR: CPT | Mod: CPTII,S$GLB,,

## 2025-04-03 PROCEDURE — 99024 POSTOP FOLLOW-UP VISIT: CPT | Mod: S$GLB,,,

## 2025-04-03 PROCEDURE — 1126F AMNT PAIN NOTED NONE PRSNT: CPT | Mod: CPTII,S$GLB,,

## 2025-04-03 PROCEDURE — 1159F MED LIST DOCD IN RCRD: CPT | Mod: CPTII,S$GLB,,

## 2025-04-03 NOTE — PROGRESS NOTES
HPI    1 week post op OS    Pt using combo gtt QAM OS, as that's what she reports being told. Pt   complains of frequent headaches, dull eye pain, blurred va. Pt denies   flashes and floaters.   Last edited by Idalia Patel, OD on 4/3/2025  1:01 PM.            Assessment /Plan     For exam results, see Encounter Report.    Status post cataract extraction and insertion of intraocular lens, left      Pt presented for one week post-op s/p CE w/IOL OS 03/24/25. Pt reports only instilling each drop QD over the last week. Microcystic edema still present at incision site with mild cells in the anterior chamber. Good IOP. Reviewed findings with pt and emphasized the importance of instilling drops TID OS - advised pt to stop Ofloxacin. Reassured pt that she is healing well and advised her to call or message if any worsening pain, changes in vision, or other issues arise.    RTC: as scheduled for one month post-op, sooner prn.

## 2025-04-06 DIAGNOSIS — M85.89 OSTEOPENIA OF MULTIPLE SITES: ICD-10-CM

## 2025-04-07 RX ORDER — IBANDRONATE SODIUM 150 MG/1
TABLET, FILM COATED ORAL
Qty: 3 TABLET | Refills: 3 | Status: SHIPPED | OUTPATIENT
Start: 2025-04-07

## 2025-04-07 NOTE — TELEPHONE ENCOUNTER
Care Due:                  Date            Visit Type   Department     Provider  --------------------------------------------------------------------------------                                EP -                              [facility] FAMILY  Last Visit: 10-      CARE ([facility])   MEDICINE       Terence Phillip                               -                              [facility]  Next Visit: 04-      CARE ([facility])   MEDICINE       Terence Phillip                                                            Last  Test          Frequency    Reason                     Performed    Due Date  --------------------------------------------------------------------------------    CMP.........  12 months..  pravastatin..............  04- 04-    Lipid Panel.  12 months..  pravastatin..............  02- 02-    Mg Level....  12 months..  ibandronate..............  04- 04-    [facility] Embedded Care Due Messages. Reference number: [id number].   4/06/2025 7:06:59 PM CDT

## 2025-04-14 DIAGNOSIS — F19.982 DRUG-INDUCED INSOMNIA: ICD-10-CM

## 2025-04-14 DIAGNOSIS — F41.9 ANXIETY: ICD-10-CM

## 2025-04-14 NOTE — TELEPHONE ENCOUNTER
No care due was identified.  [facility] Embedded Care Due Messages. Reference number: [id number].   4/14/2025 5:26:35 PM CDT

## 2025-04-15 RX ORDER — ALPRAZOLAM 0.25 MG/1
0.25 TABLET ORAL 2 TIMES DAILY PRN
Qty: 60 TABLET | Refills: 0 | Status: SHIPPED | OUTPATIENT
Start: 2025-04-15

## 2025-04-15 RX ORDER — ZOLPIDEM TARTRATE 10 MG/1
10 TABLET ORAL NIGHTLY
Qty: 30 TABLET | Refills: 0 | Status: SHIPPED | OUTPATIENT
Start: 2025-04-15

## 2025-04-23 ENCOUNTER — OFFICE VISIT (OUTPATIENT)
Dept: FAMILY MEDICINE | Facility: CLINIC | Age: 80
End: 2025-04-23
Payer: MEDICARE

## 2025-04-23 VITALS
WEIGHT: 104.81 LBS | HEART RATE: 79 BPM | HEIGHT: 60 IN | SYSTOLIC BLOOD PRESSURE: 136 MMHG | OXYGEN SATURATION: 99 % | BODY MASS INDEX: 20.58 KG/M2 | DIASTOLIC BLOOD PRESSURE: 80 MMHG

## 2025-04-23 DIAGNOSIS — F32.1 CURRENT MODERATE EPISODE OF MAJOR DEPRESSIVE DISORDER WITHOUT PRIOR EPISODE: Primary | ICD-10-CM

## 2025-04-23 DIAGNOSIS — F19.982 DRUG-INDUCED INSOMNIA: ICD-10-CM

## 2025-04-23 DIAGNOSIS — R09.81 SINUS CONGESTION: ICD-10-CM

## 2025-04-23 PROCEDURE — 99214 OFFICE O/P EST MOD 30 MIN: CPT | Mod: S$GLB,,, | Performed by: STUDENT IN AN ORGANIZED HEALTH CARE EDUCATION/TRAINING PROGRAM

## 2025-04-23 PROCEDURE — 3288F FALL RISK ASSESSMENT DOCD: CPT | Mod: CPTII,S$GLB,, | Performed by: STUDENT IN AN ORGANIZED HEALTH CARE EDUCATION/TRAINING PROGRAM

## 2025-04-23 PROCEDURE — 3075F SYST BP GE 130 - 139MM HG: CPT | Mod: CPTII,S$GLB,, | Performed by: STUDENT IN AN ORGANIZED HEALTH CARE EDUCATION/TRAINING PROGRAM

## 2025-04-23 PROCEDURE — 1101F PT FALLS ASSESS-DOCD LE1/YR: CPT | Mod: CPTII,S$GLB,, | Performed by: STUDENT IN AN ORGANIZED HEALTH CARE EDUCATION/TRAINING PROGRAM

## 2025-04-23 PROCEDURE — 1159F MED LIST DOCD IN RCRD: CPT | Mod: CPTII,S$GLB,, | Performed by: STUDENT IN AN ORGANIZED HEALTH CARE EDUCATION/TRAINING PROGRAM

## 2025-04-23 PROCEDURE — 3079F DIAST BP 80-89 MM HG: CPT | Mod: CPTII,S$GLB,, | Performed by: STUDENT IN AN ORGANIZED HEALTH CARE EDUCATION/TRAINING PROGRAM

## 2025-04-23 PROCEDURE — G2211 COMPLEX E/M VISIT ADD ON: HCPCS | Mod: S$GLB,,, | Performed by: STUDENT IN AN ORGANIZED HEALTH CARE EDUCATION/TRAINING PROGRAM

## 2025-04-23 PROCEDURE — 1125F AMNT PAIN NOTED PAIN PRSNT: CPT | Mod: CPTII,S$GLB,, | Performed by: STUDENT IN AN ORGANIZED HEALTH CARE EDUCATION/TRAINING PROGRAM

## 2025-04-23 PROCEDURE — 99999 PR PBB SHADOW E&M-EST. PATIENT-LVL V: CPT | Mod: PBBFAC,,, | Performed by: STUDENT IN AN ORGANIZED HEALTH CARE EDUCATION/TRAINING PROGRAM

## 2025-04-23 RX ORDER — PREDNISONE 20 MG/1
20 TABLET ORAL DAILY
Qty: 5 TABLET | Refills: 0 | Status: SHIPPED | OUTPATIENT
Start: 2025-04-23 | End: 2025-04-28

## 2025-04-23 RX ORDER — FLUTICASONE PROPIONATE 50 MCG
1 SPRAY, SUSPENSION (ML) NASAL DAILY
Qty: 48 G | Refills: 3 | Status: SHIPPED | OUTPATIENT
Start: 2025-04-23

## 2025-04-23 RX ORDER — ALBUTEROL SULFATE 90 UG/1
INHALANT RESPIRATORY (INHALATION)
Qty: 54 G | Refills: 3 | Status: SHIPPED | OUTPATIENT
Start: 2025-04-23

## 2025-04-23 RX ORDER — BUPROPION HYDROCHLORIDE 150 MG/1
150 TABLET ORAL DAILY
Qty: 30 TABLET | Refills: 11 | Status: SHIPPED | OUTPATIENT
Start: 2025-04-23 | End: 2026-04-23
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
## 2025-04-23 NOTE — ASSESSMENT & PLAN NOTE
A new issue. Discussed Wellbutrin vs Remeron. Patient's daughter is on Wellbutrin and advocated for it. We'll start Wellbutin.

## 2025-04-23 NOTE — PATIENT INSTRUCTIONS
Use anti-histamine medications (such as Claritin, Zyrtec, Allegra, or Xyzal) or anti-histamine nasal sprays (such as Astelin), fluticasone nasal spray (such as Flonase), and/or nasal saline rinses (such as Neti pot or Neilmed sinus rinse) to treat your sinus congestion and post nasal drip. You can also use pseudoephedrine (Sudafed or any anti-allergy pill that has a D tacked onto the name such as Claritin-D or Allegra-D or Mucinex-D) or Afrin nasal spray for relief from congestion - however, I advise you don't use either of those options more than five days in a row as this can put you at risk of dependence and rebound congestion.   ________________________________    YOU ARE SEEING THIS BECAUSE YOU ARE ON CHRONIC BENZODIAZEPINE THERAPY.  Benzodiazepine medications are high-risk and do have abuse potential, so there are certain precautions and regulations in place:  Chronic benzodiazepine use is safest if I know exactly how much you are taking, so I should be the only physician providing your prescription. The only exception I allow is my primary care colleagues filling a prescription in my absence.  You are to take your medication as prescribed or less frequently than prescribed. If you take your medication more frequently than prescribed, this can put you at risk for various hazardous outcomes. Additionally, this will be grounds for me terminating your prescription.  I will not provide early refills for lost or stolen medications.  The state requires that I see you every 4-6 months to assess efficacy and tolerance of the medication. If I have not seen you in that time frame, I cannot fill the medication for you. Please assure after every visit that you have a follow up scheduled.  We may need to randomly test your urine to assure compliance with the medication.   Your body can develop tolerance to benzodiazepines. This means that you will eventually need more and more to get the same effect. Needing more and more  will put you at greater risk of side effects, especially if you are also taking other controlled substances such as narcotics or sleep aids.  Your body can also become dependent on benzodiazepines and can go into withdrawal. Benzodiazepines can be excellent medications for short term anxiety and insomnia, but long-term benzodiazepine use can make your chronic anxiety and your chronic insomnia worse. If we need to taper the medication, discussing a plan with me is important as abrupt benzodiazepine withdrawal can induce seizures.

## 2025-04-25 ENCOUNTER — OFFICE VISIT (OUTPATIENT)
Dept: OPTOMETRY | Facility: CLINIC | Age: 80
End: 2025-04-25
Payer: MEDICARE

## 2025-04-25 DIAGNOSIS — H52.4 HYPEROPIA WITH ASTIGMATISM AND PRESBYOPIA, BILATERAL: ICD-10-CM

## 2025-04-25 DIAGNOSIS — Z96.1 STATUS POST CATARACT EXTRACTION AND INSERTION OF INTRAOCULAR LENS, LEFT: Primary | ICD-10-CM

## 2025-04-25 DIAGNOSIS — H52.03 HYPEROPIA WITH ASTIGMATISM AND PRESBYOPIA, BILATERAL: ICD-10-CM

## 2025-04-25 DIAGNOSIS — H52.203 HYPEROPIA WITH ASTIGMATISM AND PRESBYOPIA, BILATERAL: ICD-10-CM

## 2025-04-25 DIAGNOSIS — Z98.42 STATUS POST CATARACT EXTRACTION AND INSERTION OF INTRAOCULAR LENS, LEFT: Primary | ICD-10-CM

## 2025-04-25 PROCEDURE — 99999 PR PBB SHADOW E&M-EST. PATIENT-LVL III: CPT | Mod: PBBFAC,,,

## 2025-04-25 NOTE — PROGRESS NOTES
HPI    1 month PO OS    Pt VA OS slightly blurry. Pt using Ketorolac and Pred TID OS, today is the   last day. Pt denies pain or complaints.   Last edited by Estephania Dias on 4/25/2025 10:28 AM.            Assessment /Plan     For exam results, see Encounter Report.    Status post cataract extraction and insertion of intraocular lens, left    Hyperopia with astigmatism and presbyopia, bilateral      Pt presented for one month post-op s/p CE w/IOL OS: 03/24/25 with Dr. Montemayor. Pt doing well and completed all post-op drops as instructed. No anterior or posterior segment inflammation, good IOP. Reviewed all findings with pt and ed pt to RTC asap if any sudden changes in vision or other issues arise. Otherwise, will monitor annually for changes.  Pt noticing blur. Mild improvement with refraction. Pt defers spec rx, prefers to use OTC readers prn for near only. No spec rx given.    RTC: 1 year for comprehensive exam or sooner prn

## 2025-05-12 DIAGNOSIS — F41.9 ANXIETY: ICD-10-CM

## 2025-05-12 DIAGNOSIS — F19.982 DRUG-INDUCED INSOMNIA: ICD-10-CM

## 2025-05-13 RX ORDER — ZOLPIDEM TARTRATE 10 MG/1
10 TABLET ORAL NIGHTLY
Qty: 30 TABLET | Refills: 4 | Status: SHIPPED | OUTPATIENT
Start: 2025-05-13

## 2025-05-13 RX ORDER — ALPRAZOLAM 0.25 MG/1
0.25 TABLET ORAL 2 TIMES DAILY PRN
Qty: 60 TABLET | Refills: 4 | Status: SHIPPED | OUTPATIENT
Start: 2025-05-13

## 2025-05-13 NOTE — TELEPHONE ENCOUNTER
No care due was identified.  [facility] Embedded Care Due Messages. Reference number: [id number].   5/12/2025 8:33:50 PM CDT

## 2025-05-16 RX ORDER — GABAPENTIN 600 MG/1
600 TABLET ORAL NIGHTLY
Qty: 30 TABLET | Refills: 10 | Status: SHIPPED | OUTPATIENT
Start: 2025-05-16

## 2025-05-16 NOTE — TELEPHONE ENCOUNTER
No care due was identified.  [facility] Embedded Care Due Messages. Reference number: [id number].   5/16/2025 2:49:37 PM CDT

## 2025-06-25 ENCOUNTER — OFFICE VISIT (OUTPATIENT)
Dept: URGENT CARE | Facility: CLINIC | Age: 80
End: 2025-06-25
Payer: MEDICARE

## 2025-06-25 VITALS
TEMPERATURE: 98 F | HEART RATE: 77 BPM | HEIGHT: 60 IN | RESPIRATION RATE: 18 BRPM | WEIGHT: 101 LBS | BODY MASS INDEX: 19.83 KG/M2 | DIASTOLIC BLOOD PRESSURE: 60 MMHG | OXYGEN SATURATION: 96 % | SYSTOLIC BLOOD PRESSURE: 87 MMHG

## 2025-06-25 DIAGNOSIS — J32.9 BACTERIAL SINUSITIS: Primary | ICD-10-CM

## 2025-06-25 DIAGNOSIS — R09.82 POSTNASAL DRIP: ICD-10-CM

## 2025-06-25 DIAGNOSIS — R11.0 NAUSEA: ICD-10-CM

## 2025-06-25 DIAGNOSIS — B96.89 BACTERIAL SINUSITIS: Primary | ICD-10-CM

## 2025-06-25 DIAGNOSIS — R10.9 ABDOMINAL PAIN, UNSPECIFIED ABDOMINAL LOCATION: ICD-10-CM

## 2025-06-25 DIAGNOSIS — I95.89 OTHER SPECIFIED HYPOTENSION: ICD-10-CM

## 2025-06-25 DIAGNOSIS — R09.81 SINUS CONGESTION: ICD-10-CM

## 2025-06-25 PROCEDURE — 99214 OFFICE O/P EST MOD 30 MIN: CPT | Mod: S$GLB,,, | Performed by: PHYSICIAN ASSISTANT

## 2025-06-25 RX ORDER — ONDANSETRON 4 MG/1
4 TABLET, ORALLY DISINTEGRATING ORAL EVERY 8 HOURS PRN
Qty: 30 TABLET | Refills: 0 | Status: SHIPPED | OUTPATIENT
Start: 2025-06-25

## 2025-06-25 RX ORDER — DOXYCYCLINE HYCLATE 100 MG
100 TABLET ORAL 2 TIMES DAILY
Qty: 20 TABLET | Refills: 0 | Status: SHIPPED | OUTPATIENT
Start: 2025-06-25 | End: 2025-07-05

## 2025-06-25 RX ORDER — PREDNISONE 20 MG/1
TABLET ORAL
Qty: 10 TABLET | Refills: 0 | Status: SHIPPED | OUTPATIENT
Start: 2025-06-25

## 2025-06-25 RX ORDER — IPRATROPIUM BROMIDE 21 UG/1
2 SPRAY, METERED NASAL 2 TIMES DAILY
Qty: 30 ML | Refills: 0 | Status: SHIPPED | OUTPATIENT
Start: 2025-06-25

## 2025-06-25 NOTE — PROGRESS NOTES
Subjective:      Patient ID: Luis Miguel Murcia is a 80 y.o. female.    Vitals:  height is 5' (1.524 m) and weight is 45.8 kg (101 lb). Her oral temperature is 97.9 °F (36.6 °C). Her blood pressure is 87/60 (abnormal) and her pulse is 77. Her respiration is 18 and oxygen saturation is 96%.     Chief Complaint: Sore Throat    Pt presents with c/o sinus congestion/pressure/pain, sore throat, abdominal pain (on and off for a long time now - needs to see GI), no appetite and possible weight loss? X 2 months.   Pt states has taken nasal spray, and OTC sinus medication, no relief.   Pain score 7/10    Sore Throat   This is a new problem. The current episode started more than 1 month ago. The problem has been unchanged. Neither side of throat is experiencing more pain than the other. There has been no fever. The fever has been present for Less than 1 day. The pain is at a severity of 7/10. The pain is moderate. Associated symptoms include abdominal pain, congestion and coughing. Pertinent negatives include no diarrhea, drooling, ear discharge, ear pain, headaches, hoarse voice, plugged ear sensation, neck pain, shortness of breath, stridor, swollen glands, trouble swallowing or vomiting. She has had no exposure to strep or mono. She has tried NSAIDs for the symptoms. The treatment provided no relief.       Constitution: Positive for appetite change and unexpected weight change. Negative for chills, sweating, fatigue and fever.   HENT:  Positive for congestion, postnasal drip, sinus pain, sinus pressure and sore throat. Negative for ear pain, ear discharge, drooling, nosebleeds, foreign body in nose, trouble swallowing and voice change.    Neck: Negative for neck pain, neck stiffness, painful lymph nodes and neck swelling.   Cardiovascular:  Negative for chest pain, leg swelling, palpitations, sob on exertion and passing out.   Eyes:  Negative for eye pain, eye redness, photophobia, double vision, blurred vision and  eyelid swelling.   Respiratory:  Positive for cough. Negative for chest tightness, sputum production, bloody sputum, shortness of breath, stridor and wheezing.    Gastrointestinal:  Positive for abdominal pain and nausea. Negative for abdominal bloating, vomiting, constipation, diarrhea and heartburn.   Genitourinary:  Negative for dysuria, flank pain, hematuria and pelvic pain.   Musculoskeletal:  Negative for joint pain, joint swelling, abnormal ROM of joint, back pain, muscle cramps and muscle ache.   Skin:  Negative for rash and hives.   Allergic/Immunologic: Negative for seasonal allergies, food allergies, hives, itching and sneezing.   Neurological:  Negative for dizziness, light-headedness, passing out, facial drooping, speech difficulty, loss of balance, headaches, altered mental status, loss of consciousness and seizures.   Hematologic/Lymphatic: Negative for swollen lymph nodes.   Psychiatric/Behavioral:  Negative for altered mental status and nervous/anxious. The patient is not nervous/anxious.       Objective:     Physical Exam   Constitutional: She is oriented to person, place, and time. She appears well-developed. She is cooperative.  Non-toxic appearance. She does not appear ill. No distress.   HENT:   Head: Normocephalic and atraumatic.   Ears:   Right Ear: Hearing, tympanic membrane, external ear and ear canal normal.   Left Ear: Hearing, tympanic membrane, external ear and ear canal normal.   Nose: Mucosal edema and rhinorrhea present. No nasal deformity. No epistaxis. Right sinus exhibits maxillary sinus tenderness and frontal sinus tenderness. Left sinus exhibits maxillary sinus tenderness and frontal sinus tenderness.   Mouth/Throat: Uvula is midline and mucous membranes are normal. No trismus in the jaw. Normal dentition. No uvula swelling. Posterior oropharyngeal erythema and cobblestoning present. No oropharyngeal exudate or posterior oropharyngeal edema.   Eyes: Conjunctivae and lids are  normal. No scleral icterus.   Neck: Trachea normal and phonation normal. Neck supple. No edema present. No erythema present. No neck rigidity present.   Cardiovascular: Normal rate, regular rhythm, normal heart sounds and normal pulses.   Pulmonary/Chest: Effort normal and breath sounds normal. No accessory muscle usage or stridor. No respiratory distress. She has no decreased breath sounds. She has no wheezes. She has no rhonchi. She has no rales.   Abdominal: Normal appearance. Soft. There is no abdominal tenderness. There is no rebound, no guarding, no left CVA tenderness and no right CVA tenderness.   Musculoskeletal: Normal range of motion.         General: No deformity or edema. Normal range of motion.   Lymphadenopathy:     She has no cervical adenopathy.   Neurological: She is alert and oriented to person, place, and time. She exhibits normal muscle tone. Coordination normal.   Skin: Skin is warm, dry, intact, not diaphoretic, not pale and no rash. Capillary refill takes less than 2 seconds.   Psychiatric: Her speech is normal and behavior is normal. Judgment and thought content normal.   Nursing note and vitals reviewed.    Assessment:     1. Bacterial sinusitis    2. Sinus congestion    3. Postnasal drip    4. Nausea    5. Other specified hypotension    6. Abdominal pain, unspecified abdominal location      Plan:   Patient's BP is low today. Patient has Echo scheduled for next month. Patient sees PCP and Cardiology for her BP issues. Patient reports that she is going to start a daily BP and weight log again. Patient is stable, seated comfortably in NAD upon discharge. Patient reports no dizziness, vision changes, syncope or lightheadedness. Patient has had symptoms for > 10 days, so will go ahead with antibiotics RX at this time. Advised close follow-up with PCP and/or Specialist for further evaluation as needed. ER precautions given to patient as well. Patient aware, verbalized understanding and agreed  with plan of care.    Bacterial sinusitis    Sinus congestion    Postnasal drip    Nausea    Other specified hypotension    Abdominal pain, unspecified abdominal location  -     Ambulatory referral/consult to Gastroenterology    Other orders  -     doxycycline (VIBRA-TABS) 100 MG tablet; Take 1 tablet (100 mg total) by mouth 2 (two) times daily. DO NOT TAKE ON EMPTY STOMACH. PLEASE TAKE WITH FOOD. for 10 days  Dispense: 20 tablet; Refill: 0  -     ipratropium (ATROVENT) 21 mcg (0.03 %) nasal spray; 2 sprays by Each Nostril route 2 (two) times daily.  Dispense: 30 mL; Refill: 0  -     ondansetron (ZOFRAN-ODT) 4 MG TbDL; Take 1 tablet (4 mg total) by mouth every 8 (eight) hours as needed (for nausea/vomiting).  Dispense: 30 tablet; Refill: 0  -     predniSONE (DELTASONE) 20 MG tablet; Take 40mg (2 tablets) x 2 days, 30mg (1.5 tablets) x 2 days, 20mg (1 tablet) x 2 days, 10mg (0.5 tablet) x 2 days.  Dispense: 10 tablet; Refill: 0

## 2025-07-02 DIAGNOSIS — Z78.0 MENOPAUSE: ICD-10-CM

## 2025-07-29 ENCOUNTER — TELEPHONE (OUTPATIENT)
Dept: ORTHOPEDICS | Facility: CLINIC | Age: 80
End: 2025-07-29
Payer: MEDICARE

## 2025-07-29 NOTE — TELEPHONE ENCOUNTER
Called and spoke with daughter of patient. Appt scheduled with dr black. Understanding verbalized

## 2025-07-31 ENCOUNTER — HOSPITAL ENCOUNTER (OUTPATIENT)
Dept: CARDIOLOGY | Facility: HOSPITAL | Age: 80
Discharge: HOME | End: 2025-07-31
Attending: INTERNAL MEDICINE
Payer: MEDICARE

## 2025-07-31 VITALS — BODY MASS INDEX: 19.83 KG/M2 | WEIGHT: 101 LBS | HEIGHT: 60 IN

## 2025-07-31 DIAGNOSIS — I70.219 ATHEROSCLEROSIS OF NATIVE ARTERY OF EXTREMITY WITH INTERMITTENT CLAUDICATION, UNSPECIFIED EXTREMITY: ICD-10-CM

## 2025-07-31 DIAGNOSIS — I10 ESSENTIAL HYPERTENSION: ICD-10-CM

## 2025-07-31 DIAGNOSIS — I70.0 ATHEROSCLEROSIS OF AORTA: ICD-10-CM

## 2025-07-31 DIAGNOSIS — E78.5 HYPERLIPIDEMIA, UNSPECIFIED HYPERLIPIDEMIA TYPE: ICD-10-CM

## 2025-07-31 LAB
AORTIC SIZE INDEX (SOV): 1.9 CM/M2
AV INDEX (PROSTH): 0.37
AV MEAN GRADIENT: 20 MMHG
AV PEAK GRADIENT: 36 MMHG
AV VALVE AREA BY VELOCITY RATIO: 1.1 CM²
AV VALVE AREA: 1.1 CM²
AV VELOCITY RATIO: 0.4
BSA FOR ECHO PROCEDURE: 1.39 M2
CV ECHO LV RWT: 0.55 CM
DOP CALC AO PEAK VEL: 3 M/S
DOP CALC AO VTI: 63.8 CM
DOP CALC LVOT AREA: 2.8 CM2
DOP CALC LVOT DIAMETER: 1.9 CM
DOP CALC LVOT PEAK VEL: 1.2 M/S
DOP CALCLVOT PEAK VEL VTI: 23.9 CM
E WAVE DECELERATION TIME: 193 MSEC
E/A RATIO: 0.53
E/E' RATIO: 8 M/S
ECHO LV POSTERIOR WALL: 0.8 CM (ref 0.6–1.1)
FRACTIONAL SHORTENING: 31 % (ref 28–44)
INTERVENTRICULAR SEPTUM: 1 CM (ref 0.6–1.1)
LEFT ATRIUM AREA SYSTOLIC (APICAL 2 CHAMBER): 15.05 CM2
LEFT ATRIUM AREA SYSTOLIC (APICAL 4 CHAMBER): 18.5 CM2
LEFT ATRIUM VOLUME INDEX MOD: 32 ML/M2
LEFT ATRIUM VOLUME MOD: 45 ML
LEFT INTERNAL DIMENSION IN SYSTOLE: 2 CM (ref 2.1–4)
LEFT VENTRICLE DIASTOLIC VOLUME INDEX: 22.86 ML/M2
LEFT VENTRICLE DIASTOLIC VOLUME: 32 ML
LEFT VENTRICLE END SYSTOLIC VOLUME APICAL 2 CHAMBER: 36.18 ML
LEFT VENTRICLE END SYSTOLIC VOLUME APICAL 4 CHAMBER: 58.27 ML
LEFT VENTRICLE MASS INDEX: 47.6 G/M2
LEFT VENTRICLE SYSTOLIC VOLUME INDEX: 9.3 ML/M2
LEFT VENTRICLE SYSTOLIC VOLUME: 13 ML
LEFT VENTRICULAR INTERNAL DIMENSION IN DIASTOLE: 2.9 CM (ref 3.5–6)
LEFT VENTRICULAR MASS: 66.7 G
LV LATERAL E/E' RATIO: 8.3 M/S
LV SEPTAL E/E' RATIO: 8.3 M/S
LVED V (TEICH): 31.84 ML
LVES V (TEICH): 13.1 ML
LVOT MG: 3.28 MMHG
LVOT MV: 0.88 CM/S
Lab: 1.8 CM/M
MV PEAK A VEL: 0.94 M/S
MV PEAK E VEL: 0.5 M/S
MV STENOSIS PRESSURE HALF TIME: 55.94 MS
MV VALVE AREA P 1/2 METHOD: 3.93 CM2
OHS CV CPX PATIENT HEIGHT IN: 60
OHS CV RV/LV RATIO: 0.97 CM
RA PRESSURE ESTIMATED: 3 MMHG
RA VOL SYS: 13.27 ML
RIGHT ATRIAL AREA: 7.6 CM2
RIGHT ATRIUM END SYSTOLIC VOLUME APICAL 4 CHAMBER INDEX BSA: 10.12 ML/M2
RIGHT ATRIUM VOLUME AREA LENGTH APICAL 4 CHAMBER: 14.17 ML
RIGHT VENTRICLE DIASTOLIC BASEL DIMENSION: 2.8 CM
RIGHT VENTRICLE DIASTOLIC LENGTH: 6 CM
RIGHT VENTRICLE DIASTOLIC MID DIMENSION: 2 CM
RIGHT VENTRICULAR END-DIASTOLIC DIMENSION: 2.81 CM
RIGHT VENTRICULAR LENGTH IN DIASTOLE (APICAL 4-CHAMBER VIEW): 6.01 CM
RV MID DIAMA: 1.99 CM
RV TISSUE DOPPLER FREE WALL SYSTOLIC VELOCITY 1 (APICAL 4 CHAMBER VIEW): 10.35 CM/S
SINUS: 2.7 CM
STJ: 2.5 CM
TDI LATERAL: 0.06 M/S
TDI SEPTAL: 0.06 M/S
TDI: 0.06 M/S
TRICUSPID ANNULAR PLANE SYSTOLIC EXCURSION: 1.6 CM
Z-SCORE OF LEFT VENTRICULAR DIMENSION IN END DIASTOLE: -4.04
Z-SCORE OF LEFT VENTRICULAR DIMENSION IN END SYSTOLE: -2.37

## 2025-07-31 PROCEDURE — 93306 TTE W/DOPPLER COMPLETE: CPT | Mod: PO

## 2025-07-31 PROCEDURE — 93306 TTE W/DOPPLER COMPLETE: CPT | Mod: 26,,, | Performed by: INTERNAL MEDICINE

## 2025-08-03 RX ORDER — IPRATROPIUM BROMIDE 21 UG/1
2 SPRAY, METERED NASAL 2 TIMES DAILY
Qty: 30 ML | Refills: 0 | Status: SHIPPED | OUTPATIENT
Start: 2025-08-03

## 2025-08-05 ENCOUNTER — OFFICE VISIT (OUTPATIENT)
Dept: CARDIOLOGY | Facility: CLINIC | Age: 80
End: 2025-08-05
Payer: MEDICARE

## 2025-08-05 ENCOUNTER — HOSPITAL ENCOUNTER (OUTPATIENT)
Dept: RADIOLOGY | Facility: HOSPITAL | Age: 80
Discharge: HOME | End: 2025-08-05
Attending: STUDENT IN AN ORGANIZED HEALTH CARE EDUCATION/TRAINING PROGRAM
Payer: MEDICARE

## 2025-08-05 VITALS
WEIGHT: 102.31 LBS | SYSTOLIC BLOOD PRESSURE: 145 MMHG | DIASTOLIC BLOOD PRESSURE: 75 MMHG | HEIGHT: 60 IN | HEART RATE: 73 BPM | BODY MASS INDEX: 20.09 KG/M2

## 2025-08-05 DIAGNOSIS — I70.0 ATHEROSCLEROSIS OF AORTA: Primary | ICD-10-CM

## 2025-08-05 DIAGNOSIS — Z78.0 MENOPAUSE: ICD-10-CM

## 2025-08-05 DIAGNOSIS — E78.5 HYPERLIPIDEMIA, UNSPECIFIED HYPERLIPIDEMIA TYPE: ICD-10-CM

## 2025-08-05 DIAGNOSIS — M25.569 KNEE PAIN, UNSPECIFIED CHRONICITY, UNSPECIFIED LATERALITY: Primary | ICD-10-CM

## 2025-08-05 DIAGNOSIS — I70.219 ATHEROSCLEROSIS OF NATIVE ARTERY OF EXTREMITY WITH INTERMITTENT CLAUDICATION, UNSPECIFIED EXTREMITY: ICD-10-CM

## 2025-08-05 DIAGNOSIS — N18.31 CHRONIC KIDNEY DISEASE, STAGE 3A: ICD-10-CM

## 2025-08-05 DIAGNOSIS — I10 ESSENTIAL HYPERTENSION: ICD-10-CM

## 2025-08-05 PROCEDURE — 99999 PR PBB SHADOW E&M-EST. PATIENT-LVL IV: CPT | Mod: PBBFAC,,, | Performed by: INTERNAL MEDICINE

## 2025-08-05 PROCEDURE — 3288F FALL RISK ASSESSMENT DOCD: CPT | Mod: CPTII,S$GLB,, | Performed by: INTERNAL MEDICINE

## 2025-08-05 PROCEDURE — 1126F AMNT PAIN NOTED NONE PRSNT: CPT | Mod: CPTII,S$GLB,, | Performed by: INTERNAL MEDICINE

## 2025-08-05 PROCEDURE — 1101F PT FALLS ASSESS-DOCD LE1/YR: CPT | Mod: CPTII,S$GLB,, | Performed by: INTERNAL MEDICINE

## 2025-08-05 PROCEDURE — 77092 TBS I&R FX RSK QHP: CPT | Mod: ,,, | Performed by: RADIOLOGY

## 2025-08-05 PROCEDURE — 3078F DIAST BP <80 MM HG: CPT | Mod: CPTII,S$GLB,, | Performed by: INTERNAL MEDICINE

## 2025-08-05 PROCEDURE — 3077F SYST BP >= 140 MM HG: CPT | Mod: CPTII,S$GLB,, | Performed by: INTERNAL MEDICINE

## 2025-08-05 PROCEDURE — 77091 TBS TECHL CALCULATION ONLY: CPT | Mod: PO

## 2025-08-05 PROCEDURE — 99215 OFFICE O/P EST HI 40 MIN: CPT | Mod: S$GLB,,, | Performed by: INTERNAL MEDICINE

## 2025-08-05 PROCEDURE — 77080 DXA BONE DENSITY AXIAL: CPT | Mod: 26,,, | Performed by: RADIOLOGY
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
## 2025-08-07 ENCOUNTER — HOSPITAL ENCOUNTER (OUTPATIENT)
Dept: RADIOLOGY | Facility: HOSPITAL | Age: 80
Discharge: HOME | End: 2025-08-07
Attending: ORTHOPAEDIC SURGERY
Payer: MEDICARE

## 2025-08-07 ENCOUNTER — OFFICE VISIT (OUTPATIENT)
Dept: ORTHOPEDICS | Facility: CLINIC | Age: 80
End: 2025-08-07
Payer: MEDICARE

## 2025-08-07 DIAGNOSIS — M25.561 CHRONIC PAIN OF RIGHT KNEE: Primary | ICD-10-CM

## 2025-08-07 DIAGNOSIS — G89.29 CHRONIC PAIN OF RIGHT KNEE: Primary | ICD-10-CM

## 2025-08-07 DIAGNOSIS — M25.569 KNEE PAIN, UNSPECIFIED CHRONICITY, UNSPECIFIED LATERALITY: ICD-10-CM

## 2025-08-07 PROCEDURE — 73560 X-RAY EXAM OF KNEE 1 OR 2: CPT | Mod: TC,PO,LT

## 2025-08-07 PROCEDURE — 99999 PR PBB SHADOW E&M-EST. PATIENT-LVL III: CPT | Mod: PBBFAC,,, | Performed by: ORTHOPAEDIC SURGERY

## 2025-08-07 PROCEDURE — 73562 X-RAY EXAM OF KNEE 3: CPT | Mod: 26,RT,, | Performed by: RADIOLOGY

## 2025-08-07 PROCEDURE — 73560 X-RAY EXAM OF KNEE 1 OR 2: CPT | Mod: 26,LT,, | Performed by: RADIOLOGY

## 2025-08-07 RX ORDER — TRIAMCINOLONE ACETONIDE 40 MG/ML
40 INJECTION, SUSPENSION INTRA-ARTICULAR; INTRAMUSCULAR
Status: DISCONTINUED | OUTPATIENT
Start: 2025-08-07 | End: 2025-08-07 | Stop reason: HOSPADM

## 2025-08-07 RX ADMIN — TRIAMCINOLONE ACETONIDE 40 MG: 40 INJECTION, SUSPENSION INTRA-ARTICULAR; INTRAMUSCULAR at 01:08

## 2025-08-07 NOTE — PROGRESS NOTES
80 years old right knee pain about 3 years time.  Patient feels that started after she fell on her knee.  Pain is 6 on good days, 9 on bad days.  She has had to Kenalog injections in the past and 1st injection was successful 2nd injection not so successful.  Last injection was several months ago    Exam shows some tenderness the joint line without signs infection or instability hip range of motion full and painless     X-rays show degenerative changes     Assessment:  Right knee arthrosis     Plan: Kenalog injection right knee, encourage strengthening exercises over time, follow up as needed

## 2025-08-07 NOTE — PROCEDURES
Large Joint Aspiration/Injection: R knee    Date/Time: 8/7/2025 1:45 PM    Performed by: Jose Bolaños MD  Authorized by: Jose Bolaños MD    Timeout: prior to procedure the correct patient, procedure, and site was verified    Prep: patient was prepped and draped in usual sterile fashion      Details:  Needle Size:  21 G  Approach:  Anterolateral  Location:  Knee  Site:  R knee  Medications:  40 mg triamcinolone acetonide 40 mg/mL  Patient tolerance:  Patient tolerated the procedure well with no immediate complications

## 2025-09-04 ENCOUNTER — OFFICE VISIT (OUTPATIENT)
Dept: FAMILY MEDICINE | Facility: CLINIC | Age: 80
End: 2025-09-04
Payer: MEDICARE

## 2025-09-04 VITALS
HEIGHT: 60 IN | WEIGHT: 101.88 LBS | SYSTOLIC BLOOD PRESSURE: 136 MMHG | DIASTOLIC BLOOD PRESSURE: 76 MMHG | BODY MASS INDEX: 20 KG/M2 | OXYGEN SATURATION: 97 % | HEART RATE: 77 BPM

## 2025-09-04 DIAGNOSIS — F19.982 DRUG-INDUCED INSOMNIA: ICD-10-CM

## 2025-09-04 DIAGNOSIS — N18.31 CHRONIC KIDNEY DISEASE, STAGE 3A: ICD-10-CM

## 2025-09-04 DIAGNOSIS — D47.3 ESSENTIAL (HEMORRHAGIC) THROMBOCYTHEMIA: ICD-10-CM

## 2025-09-04 DIAGNOSIS — J43.2 CENTRILOBULAR EMPHYSEMA: ICD-10-CM

## 2025-09-04 DIAGNOSIS — E78.49 OTHER HYPERLIPIDEMIA: ICD-10-CM

## 2025-09-04 DIAGNOSIS — K21.9 LPRD (LARYNGOPHARYNGEAL REFLUX DISEASE): ICD-10-CM

## 2025-09-04 DIAGNOSIS — I10 ESSENTIAL HYPERTENSION: ICD-10-CM

## 2025-09-04 DIAGNOSIS — D50.9 IRON DEFICIENCY ANEMIA, UNSPECIFIED IRON DEFICIENCY ANEMIA TYPE: ICD-10-CM

## 2025-09-04 DIAGNOSIS — F32.1 CURRENT MODERATE EPISODE OF MAJOR DEPRESSIVE DISORDER WITHOUT PRIOR EPISODE: Primary | ICD-10-CM

## 2025-09-04 PROCEDURE — 3078F DIAST BP <80 MM HG: CPT | Mod: CPTII,S$GLB,, | Performed by: STUDENT IN AN ORGANIZED HEALTH CARE EDUCATION/TRAINING PROGRAM

## 2025-09-04 PROCEDURE — 99999 PR PBB SHADOW E&M-EST. PATIENT-LVL V: CPT | Mod: PBBFAC,,, | Performed by: STUDENT IN AN ORGANIZED HEALTH CARE EDUCATION/TRAINING PROGRAM

## 2025-09-04 PROCEDURE — 99214 OFFICE O/P EST MOD 30 MIN: CPT | Mod: S$GLB,,, | Performed by: STUDENT IN AN ORGANIZED HEALTH CARE EDUCATION/TRAINING PROGRAM

## 2025-09-04 PROCEDURE — 1101F PT FALLS ASSESS-DOCD LE1/YR: CPT | Mod: CPTII,S$GLB,, | Performed by: STUDENT IN AN ORGANIZED HEALTH CARE EDUCATION/TRAINING PROGRAM

## 2025-09-04 PROCEDURE — 3075F SYST BP GE 130 - 139MM HG: CPT | Mod: CPTII,S$GLB,, | Performed by: STUDENT IN AN ORGANIZED HEALTH CARE EDUCATION/TRAINING PROGRAM

## 2025-09-04 PROCEDURE — 1126F AMNT PAIN NOTED NONE PRSNT: CPT | Mod: CPTII,S$GLB,, | Performed by: STUDENT IN AN ORGANIZED HEALTH CARE EDUCATION/TRAINING PROGRAM

## 2025-09-04 PROCEDURE — 1159F MED LIST DOCD IN RCRD: CPT | Mod: CPTII,S$GLB,, | Performed by: STUDENT IN AN ORGANIZED HEALTH CARE EDUCATION/TRAINING PROGRAM

## 2025-09-04 PROCEDURE — G2211 COMPLEX E/M VISIT ADD ON: HCPCS | Mod: S$GLB,,, | Performed by: STUDENT IN AN ORGANIZED HEALTH CARE EDUCATION/TRAINING PROGRAM

## 2025-09-04 PROCEDURE — 3288F FALL RISK ASSESSMENT DOCD: CPT | Mod: CPTII,S$GLB,, | Performed by: STUDENT IN AN ORGANIZED HEALTH CARE EDUCATION/TRAINING PROGRAM

## 2025-09-04 RX ORDER — OLMESARTAN MEDOXOMIL 20 MG/1
20 TABLET ORAL DAILY
Qty: 90 TABLET | Refills: 3 | Status: SHIPPED | OUTPATIENT
Start: 2025-09-04 | End: 2026-09-04

## 2025-09-04 RX ORDER — GABAPENTIN 600 MG/1
600 TABLET ORAL NIGHTLY
Qty: 90 TABLET | Refills: 3 | Status: SHIPPED | OUTPATIENT
Start: 2025-09-04

## 2025-09-04 RX ORDER — BUPROPION HYDROCHLORIDE 150 MG/1
150 TABLET ORAL DAILY
Qty: 90 TABLET | Refills: 3 | Status: SHIPPED | OUTPATIENT
Start: 2025-09-04 | End: 2026-09-04

## 2025-09-04 RX ORDER — DEXLANSOPRAZOLE 60 MG/1
CAPSULE, DELAYED RELEASE ORAL
Qty: 90 CAPSULE | Refills: 3 | Status: SHIPPED | OUTPATIENT
Start: 2025-09-04

## (undated) DEVICE — SEE MEDLINE ITEM 152622

## (undated) DEVICE — MARKER SKIN STND TIP BLUE BARR

## (undated) DEVICE — SYR GLASS 5CC LUER LOK

## (undated) DEVICE — GLOVE SURGICAL LATEX SZ 7

## (undated) DEVICE — TRAY NERVE BLOCK

## (undated) DEVICE — NDL TUOHY EPIDURAL 20G X 3.5

## (undated) DEVICE — APPLICATOR CHLORAPREP CLR 10.5

## (undated) DEVICE — NDL SPINAL SPINOCAN 22GX3.5